# Patient Record
Sex: MALE | Race: ASIAN | Employment: OTHER | URBAN - METROPOLITAN AREA
[De-identification: names, ages, dates, MRNs, and addresses within clinical notes are randomized per-mention and may not be internally consistent; named-entity substitution may affect disease eponyms.]

---

## 2017-01-03 ENCOUNTER — TELEPHONE (OUTPATIENT)
Dept: FAMILY MEDICINE CLINIC | Age: 72
End: 2017-01-03

## 2017-02-21 ENCOUNTER — OFFICE VISIT (OUTPATIENT)
Dept: FAMILY MEDICINE CLINIC | Age: 72
End: 2017-02-21

## 2017-02-21 VITALS
DIASTOLIC BLOOD PRESSURE: 58 MMHG | WEIGHT: 165.2 LBS | BODY MASS INDEX: 27.52 KG/M2 | SYSTOLIC BLOOD PRESSURE: 122 MMHG | HEIGHT: 65 IN | HEART RATE: 76 BPM | RESPIRATION RATE: 16 BRPM

## 2017-02-21 DIAGNOSIS — I25.10 ATHEROSCLEROSIS OF NATIVE CORONARY ARTERY OF NATIVE HEART WITHOUT ANGINA PECTORIS: ICD-10-CM

## 2017-02-21 DIAGNOSIS — Z79.4 CONTROLLED TYPE 2 DIABETES MELLITUS WITHOUT COMPLICATION, WITH LONG-TERM CURRENT USE OF INSULIN (HCC): Primary | ICD-10-CM

## 2017-02-21 DIAGNOSIS — I77.9 BILATERAL CAROTID ARTERY DISEASE (HCC): ICD-10-CM

## 2017-02-21 DIAGNOSIS — I77.1 SUBCLAVIAN ARTERIAL STENOSIS (HCC): ICD-10-CM

## 2017-02-21 DIAGNOSIS — I10 ESSENTIAL HYPERTENSION: ICD-10-CM

## 2017-02-21 DIAGNOSIS — E78.00 PURE HYPERCHOLESTEROLEMIA: ICD-10-CM

## 2017-02-21 DIAGNOSIS — E11.9 CONTROLLED TYPE 2 DIABETES MELLITUS WITHOUT COMPLICATION, WITH LONG-TERM CURRENT USE OF INSULIN (HCC): Primary | ICD-10-CM

## 2017-02-21 LAB
ANION GAP SERPL CALCULATED.3IONS-SCNC: 15 MMOL/L (ref 3–16)
BUN BLDV-MCNC: 23 MG/DL (ref 7–20)
CALCIUM SERPL-MCNC: 9.3 MG/DL (ref 8.3–10.6)
CHLORIDE BLD-SCNC: 101 MMOL/L (ref 99–110)
CO2: 23 MMOL/L (ref 21–32)
CREAT SERPL-MCNC: 0.9 MG/DL (ref 0.8–1.3)
GFR AFRICAN AMERICAN: >60
GFR NON-AFRICAN AMERICAN: >60
GLUCOSE BLD-MCNC: 129 MG/DL (ref 70–99)
POTASSIUM SERPL-SCNC: 4.8 MMOL/L (ref 3.5–5.1)
SODIUM BLD-SCNC: 139 MMOL/L (ref 136–145)

## 2017-02-21 PROCEDURE — 99214 OFFICE O/P EST MOD 30 MIN: CPT | Performed by: INTERNAL MEDICINE

## 2017-02-21 PROCEDURE — 36415 COLL VENOUS BLD VENIPUNCTURE: CPT | Performed by: INTERNAL MEDICINE

## 2017-02-21 ASSESSMENT — ENCOUNTER SYMPTOMS
ALLERGIC/IMMUNOLOGIC NEGATIVE: 1
RESPIRATORY NEGATIVE: 1
GASTROINTESTINAL NEGATIVE: 1

## 2017-02-22 LAB
ESTIMATED AVERAGE GLUCOSE: 154.2 MG/DL
HBA1C MFR BLD: 7 %

## 2017-02-28 RX ORDER — RAMIPRIL 10 MG/1
CAPSULE ORAL
Qty: 90 CAPSULE | Refills: 3 | Status: SHIPPED | OUTPATIENT
Start: 2017-02-28 | End: 2018-01-08 | Stop reason: SDUPTHER

## 2017-02-28 RX ORDER — METOPROLOL SUCCINATE 50 MG/1
TABLET, EXTENDED RELEASE ORAL
Qty: 180 TABLET | Refills: 3 | Status: SHIPPED | OUTPATIENT
Start: 2017-02-28 | End: 2017-10-22 | Stop reason: SDUPTHER

## 2017-05-22 ENCOUNTER — OFFICE VISIT (OUTPATIENT)
Dept: FAMILY MEDICINE CLINIC | Age: 72
End: 2017-05-22

## 2017-05-22 VITALS
HEART RATE: 88 BPM | HEIGHT: 65 IN | DIASTOLIC BLOOD PRESSURE: 64 MMHG | SYSTOLIC BLOOD PRESSURE: 114 MMHG | BODY MASS INDEX: 27.12 KG/M2 | RESPIRATION RATE: 16 BRPM | WEIGHT: 162.8 LBS

## 2017-05-22 DIAGNOSIS — Z79.4 CONTROLLED TYPE 2 DIABETES MELLITUS WITHOUT COMPLICATION, WITH LONG-TERM CURRENT USE OF INSULIN (HCC): Primary | ICD-10-CM

## 2017-05-22 DIAGNOSIS — I25.10 ATHEROSCLEROSIS OF NATIVE CORONARY ARTERY OF NATIVE HEART WITHOUT ANGINA PECTORIS: ICD-10-CM

## 2017-05-22 DIAGNOSIS — I10 ESSENTIAL HYPERTENSION: ICD-10-CM

## 2017-05-22 DIAGNOSIS — I77.1 SUBCLAVIAN ARTERIAL STENOSIS (HCC): ICD-10-CM

## 2017-05-22 DIAGNOSIS — H93.13 TINNITUS, BILATERAL: ICD-10-CM

## 2017-05-22 DIAGNOSIS — I77.9 BILATERAL CAROTID ARTERY DISEASE (HCC): ICD-10-CM

## 2017-05-22 DIAGNOSIS — E78.00 PURE HYPERCHOLESTEROLEMIA: ICD-10-CM

## 2017-05-22 DIAGNOSIS — E11.9 CONTROLLED TYPE 2 DIABETES MELLITUS WITHOUT COMPLICATION, WITH LONG-TERM CURRENT USE OF INSULIN (HCC): Primary | ICD-10-CM

## 2017-05-22 PROCEDURE — 4010F ACE/ARB THERAPY RXD/TAKEN: CPT | Performed by: INTERNAL MEDICINE

## 2017-05-22 PROCEDURE — 36415 COLL VENOUS BLD VENIPUNCTURE: CPT | Performed by: INTERNAL MEDICINE

## 2017-05-22 PROCEDURE — 99214 OFFICE O/P EST MOD 30 MIN: CPT | Performed by: INTERNAL MEDICINE

## 2017-05-22 ASSESSMENT — ENCOUNTER SYMPTOMS
RESPIRATORY NEGATIVE: 1
BACK PAIN: 1
GASTROINTESTINAL NEGATIVE: 1
ALLERGIC/IMMUNOLOGIC NEGATIVE: 1

## 2017-05-22 ASSESSMENT — PATIENT HEALTH QUESTIONNAIRE - PHQ9
SUM OF ALL RESPONSES TO PHQ9 QUESTIONS 1 & 2: 0
2. FEELING DOWN, DEPRESSED OR HOPELESS: 0
SUM OF ALL RESPONSES TO PHQ QUESTIONS 1-9: 0
1. LITTLE INTEREST OR PLEASURE IN DOING THINGS: 0

## 2017-05-23 LAB
ANION GAP SERPL CALCULATED.3IONS-SCNC: 15 MMOL/L (ref 3–16)
BUN BLDV-MCNC: 25 MG/DL (ref 7–20)
CALCIUM SERPL-MCNC: 9.4 MG/DL (ref 8.3–10.6)
CHLORIDE BLD-SCNC: 104 MMOL/L (ref 99–110)
CO2: 21 MMOL/L (ref 21–32)
CREAT SERPL-MCNC: 1 MG/DL (ref 0.8–1.3)
ESTIMATED AVERAGE GLUCOSE: 151.3 MG/DL
GFR AFRICAN AMERICAN: >60
GFR NON-AFRICAN AMERICAN: >60
GLUCOSE BLD-MCNC: 120 MG/DL (ref 70–99)
HBA1C MFR BLD: 6.9 %
POTASSIUM SERPL-SCNC: 4.8 MMOL/L (ref 3.5–5.1)
SODIUM BLD-SCNC: 140 MMOL/L (ref 136–145)

## 2017-07-12 ENCOUNTER — OFFICE VISIT (OUTPATIENT)
Dept: ENT CLINIC | Age: 72
End: 2017-07-12

## 2017-07-12 VITALS
HEART RATE: 92 BPM | WEIGHT: 164.1 LBS | SYSTOLIC BLOOD PRESSURE: 131 MMHG | HEIGHT: 65 IN | DIASTOLIC BLOOD PRESSURE: 66 MMHG | BODY MASS INDEX: 27.34 KG/M2

## 2017-07-12 DIAGNOSIS — H91.93 HEARING LOSS OF BOTH EARS: ICD-10-CM

## 2017-07-12 DIAGNOSIS — H93.13 SUBJECTIVE TINNITUS OF BOTH EARS: Primary | ICD-10-CM

## 2017-07-12 PROCEDURE — 99203 OFFICE O/P NEW LOW 30 MIN: CPT | Performed by: OTOLARYNGOLOGY

## 2017-07-13 ENCOUNTER — OFFICE VISIT (OUTPATIENT)
Dept: ENT CLINIC | Age: 72
End: 2017-07-13

## 2017-07-13 DIAGNOSIS — H93.13 SUBJECTIVE TINNITUS, BILATERAL: ICD-10-CM

## 2017-07-13 DIAGNOSIS — H90.8 MIXED HEARING LOSS: Primary | ICD-10-CM

## 2017-07-13 DIAGNOSIS — H90.3 SENSORY HEARING LOSS, BILATERAL: ICD-10-CM

## 2017-07-13 PROCEDURE — 92557 COMPREHENSIVE HEARING TEST: CPT | Performed by: AUDIOLOGIST

## 2017-07-13 PROCEDURE — 92550 TYMPANOMETRY & REFLEX THRESH: CPT | Performed by: AUDIOLOGIST

## 2017-07-14 ENCOUNTER — OFFICE VISIT (OUTPATIENT)
Dept: ENT CLINIC | Age: 72
End: 2017-07-14

## 2017-07-14 VITALS
HEART RATE: 84 BPM | HEIGHT: 65 IN | SYSTOLIC BLOOD PRESSURE: 125 MMHG | WEIGHT: 163.8 LBS | DIASTOLIC BLOOD PRESSURE: 61 MMHG | BODY MASS INDEX: 27.29 KG/M2

## 2017-07-14 DIAGNOSIS — H90.A22 SENSORINEURAL HEARING LOSS (SNHL) OF LEFT EAR WITH RESTRICTED HEARING OF RIGHT EAR: ICD-10-CM

## 2017-07-14 DIAGNOSIS — H65.01 ACUTE SEROUS OTITIS MEDIA, RIGHT EAR: ICD-10-CM

## 2017-07-14 DIAGNOSIS — H90.A31 MIXED CONDUCTIVE AND SENSORINEURAL HEARING LOSS OF RIGHT EAR WITH RESTRICTED HEARING OF LEFT EAR: ICD-10-CM

## 2017-07-14 DIAGNOSIS — R09.82 POST-NASAL DRIP: ICD-10-CM

## 2017-07-14 DIAGNOSIS — R49.0 HOARSENESS OF VOICE: Primary | ICD-10-CM

## 2017-07-14 DIAGNOSIS — H93.13 SUBJECTIVE TINNITUS, BILATERAL: ICD-10-CM

## 2017-07-14 PROCEDURE — 31575 DIAGNOSTIC LARYNGOSCOPY: CPT | Performed by: OTOLARYNGOLOGY

## 2017-07-14 PROCEDURE — 99213 OFFICE O/P EST LOW 20 MIN: CPT | Performed by: OTOLARYNGOLOGY

## 2017-07-30 PROBLEM — H65.01 ACUTE SEROUS OTITIS MEDIA, RIGHT EAR: Status: ACTIVE | Noted: 2017-07-30

## 2017-07-30 PROBLEM — H90.A22 SENSORINEURAL HEARING LOSS (SNHL) OF LEFT EAR WITH RESTRICTED HEARING OF RIGHT EAR: Status: ACTIVE | Noted: 2017-07-30

## 2017-07-30 PROBLEM — R09.82 POST-NASAL DRIP: Status: ACTIVE | Noted: 2017-07-30

## 2017-07-30 PROBLEM — R49.0 HOARSENESS OF VOICE: Status: ACTIVE | Noted: 2017-07-30

## 2017-07-30 PROBLEM — H93.19 SUBJECTIVE TINNITUS: Status: ACTIVE | Noted: 2017-07-30

## 2017-07-30 PROBLEM — H90.A31 MIXED CONDUCTIVE AND SENSORINEURAL HEARING LOSS OF RIGHT EAR WITH RESTRICTED HEARING OF LEFT EAR: Status: ACTIVE | Noted: 2017-07-30

## 2017-08-29 ENCOUNTER — OFFICE VISIT (OUTPATIENT)
Dept: FAMILY MEDICINE CLINIC | Age: 72
End: 2017-08-29

## 2017-08-29 VITALS
HEART RATE: 88 BPM | SYSTOLIC BLOOD PRESSURE: 118 MMHG | DIASTOLIC BLOOD PRESSURE: 66 MMHG | BODY MASS INDEX: 26.92 KG/M2 | WEIGHT: 161.6 LBS | HEIGHT: 65 IN | OXYGEN SATURATION: 96 % | RESPIRATION RATE: 16 BRPM

## 2017-08-29 DIAGNOSIS — I77.9 BILATERAL CAROTID ARTERY DISEASE (HCC): ICD-10-CM

## 2017-08-29 DIAGNOSIS — E11.9 CONTROLLED TYPE 2 DIABETES MELLITUS WITHOUT COMPLICATION, WITH LONG-TERM CURRENT USE OF INSULIN (HCC): Primary | ICD-10-CM

## 2017-08-29 DIAGNOSIS — I48.0 INTERMITTENT ATRIAL FIBRILLATION (HCC): ICD-10-CM

## 2017-08-29 DIAGNOSIS — I25.10 ATHEROSCLEROSIS OF NATIVE CORONARY ARTERY OF NATIVE HEART WITHOUT ANGINA PECTORIS: ICD-10-CM

## 2017-08-29 DIAGNOSIS — I77.1 SUBCLAVIAN ARTERIAL STENOSIS (HCC): ICD-10-CM

## 2017-08-29 DIAGNOSIS — Z79.4 CONTROLLED TYPE 2 DIABETES MELLITUS WITHOUT COMPLICATION, WITH LONG-TERM CURRENT USE OF INSULIN (HCC): Primary | ICD-10-CM

## 2017-08-29 DIAGNOSIS — E78.00 PURE HYPERCHOLESTEROLEMIA: ICD-10-CM

## 2017-08-29 LAB
ANION GAP SERPL CALCULATED.3IONS-SCNC: 16 MMOL/L (ref 3–16)
BUN BLDV-MCNC: 24 MG/DL (ref 7–20)
CALCIUM SERPL-MCNC: 9.6 MG/DL (ref 8.3–10.6)
CHLORIDE BLD-SCNC: 102 MMOL/L (ref 99–110)
CO2: 22 MMOL/L (ref 21–32)
CREAT SERPL-MCNC: 0.8 MG/DL (ref 0.8–1.3)
GFR AFRICAN AMERICAN: >60
GFR NON-AFRICAN AMERICAN: >60
GLUCOSE BLD-MCNC: 161 MG/DL (ref 70–99)
POTASSIUM SERPL-SCNC: 4.6 MMOL/L (ref 3.5–5.1)
SODIUM BLD-SCNC: 140 MMOL/L (ref 136–145)

## 2017-08-29 PROCEDURE — 36415 COLL VENOUS BLD VENIPUNCTURE: CPT | Performed by: INTERNAL MEDICINE

## 2017-08-29 PROCEDURE — 99214 OFFICE O/P EST MOD 30 MIN: CPT | Performed by: INTERNAL MEDICINE

## 2017-08-29 ASSESSMENT — ENCOUNTER SYMPTOMS
ALLERGIC/IMMUNOLOGIC NEGATIVE: 1
GASTROINTESTINAL NEGATIVE: 1
RESPIRATORY NEGATIVE: 1

## 2017-08-30 LAB
ESTIMATED AVERAGE GLUCOSE: 145.6 MG/DL
HBA1C MFR BLD: 6.7 %

## 2017-09-01 ENCOUNTER — TELEPHONE (OUTPATIENT)
Dept: FAMILY MEDICINE CLINIC | Age: 72
End: 2017-09-01

## 2017-09-14 RX ORDER — LANCETS
EACH MISCELLANEOUS
Qty: 204 EACH | Refills: 2 | Status: SHIPPED | OUTPATIENT
Start: 2017-09-14 | End: 2018-01-08 | Stop reason: SDUPTHER

## 2017-09-14 RX ORDER — ATORVASTATIN CALCIUM 20 MG/1
TABLET, FILM COATED ORAL
Qty: 90 TABLET | Refills: 2 | Status: SHIPPED | OUTPATIENT
Start: 2017-09-14 | End: 2018-01-08 | Stop reason: SDUPTHER

## 2017-09-14 RX ORDER — BLOOD SUGAR DIAGNOSTIC
STRIP MISCELLANEOUS
Qty: 200 STRIP | Refills: 2 | Status: SHIPPED | OUTPATIENT
Start: 2017-09-14 | End: 2018-01-08 | Stop reason: SDUPTHER

## 2017-09-14 RX ORDER — GLIPIZIDE 5 MG/1
TABLET ORAL
Qty: 90 TABLET | Refills: 2 | Status: SHIPPED | OUTPATIENT
Start: 2017-09-14 | End: 2018-01-08 | Stop reason: SDUPTHER

## 2017-09-26 ENCOUNTER — OFFICE VISIT (OUTPATIENT)
Dept: ENT CLINIC | Age: 72
End: 2017-09-26

## 2017-09-26 VITALS
SYSTOLIC BLOOD PRESSURE: 116 MMHG | HEART RATE: 64 BPM | WEIGHT: 161.5 LBS | BODY MASS INDEX: 25.96 KG/M2 | DIASTOLIC BLOOD PRESSURE: 62 MMHG | HEIGHT: 66 IN

## 2017-09-26 DIAGNOSIS — H90.A22 SENSORINEURAL HEARING LOSS (SNHL) OF LEFT EAR WITH RESTRICTED HEARING OF RIGHT EAR: ICD-10-CM

## 2017-09-26 DIAGNOSIS — H65.21 CHRONIC SEROUS OTITIS MEDIA OF RIGHT EAR: Primary | ICD-10-CM

## 2017-09-26 DIAGNOSIS — H69.82 ETD (EUSTACHIAN TUBE DYSFUNCTION), LEFT: ICD-10-CM

## 2017-09-26 DIAGNOSIS — H90.A31 MIXED CONDUCTIVE AND SENSORINEURAL HEARING LOSS OF RIGHT EAR WITH RESTRICTED HEARING OF LEFT EAR: ICD-10-CM

## 2017-09-26 PROCEDURE — 99214 OFFICE O/P EST MOD 30 MIN: CPT | Performed by: OTOLARYNGOLOGY

## 2017-09-26 NOTE — PROGRESS NOTES
[address] [facility]          PCP:  Cooper Cardoso MD      CHIEF COMPLAINT:  Chief Complaint   Patient presents with    Otitis Media     Right serous       HISTORY OF PRESENT ILLNESS:   Alex Urbina is a 74 y.o. male here for recheck and follow-up of right serous otitis media and conductive hearing loss. Since the last visit here, his hearing in the right ear has not improved. REVIEW OF SYSTEMS:   CONSTITUTIONAL:  Denied fever and chills. Denied unexplained weight loss. EARS, NOSE, THROAT:  Denied otorrhea, otalgia, epistaxis, nasal dyspnea, rhinorrhea, sore throat and chronic hoarseness.         Current Outpatient Prescriptions   Medication Sig Dispense Refill    glipiZIDE (GLUCOTROL) 5 MG tablet Take 1 tablet by mouth  daily 90 tablet 2    atorvastatin (LIPITOR) 20 MG tablet Take 1 tablet by mouth  daily 90 tablet 2    ACCU-CHEK BRITNEY PLUS strip Test two times daily for  diabetes 200 strip 2    ACCU-CHEK FASTCLIX LANCETS MISC Test two times daily for  diabetes 204 each 2    metoprolol succinate (TOPROL XL) 50 MG extended release tablet Take 1 tablet by mouth two  times daily 180 tablet 3    ramipril (ALTACE) 10 MG capsule Take 1 capsule by mouth  every evening 90 capsule 3    SITagliptin (JANUVIA) 100 MG tablet Take 1 tablet by mouth daily 90 tablet 3    insulin glargine (LANTUS SOLOSTAR) 100 UNIT/ML injection pen Inject 10 Units into the skin nightly Requesting a 3 mth supply 100 mL 3    ramipril (ALTACE) 5 MG capsule Take 1 capsule by mouth every morning 90 capsule 3    Insulin Pen Needle ([employer] PEN NEEDLES) 31G X 6 MM MISC Use with insulin  Administration once daily 100 each 3    Lancets Misc. (ACCU-CHEK FASTCLIX LANCET) KIT Test twice daily for diabetes e11.9 1 kit 5    Insulin Syringe-Needle U-100 (KROGER INSULIN SYRINGE) 31G X 5/16\" 0.5 ML MISC 1 each by Does not apply route daily Use daily for insulin adminstration 100 each 3    Blood Glucose Monitoring Suppl (ACCU-CHEK BRITNEY PLUS) W/DEVICE KIT 1 each by Does not apply route 2 times daily. TEST TWICE DAILY FOR DIABETES 1 kit 0    aspirin EC 81 MG EC tablet Take 1 tablet by mouth daily. No current facility-administered medications for this visit. EXAMINATION:      VITALS SIGNS reviewed. Vitals:    09/26/17 1552   BP: 116/62   Pulse: 64      GENERAL APPEARANCE:  Well developed, well nourished, no apparent distress, no apparent deformities.  (+).  (+) EARS, OTOMICROSCOPY:  Bilateral hearing aids were removed for examination. The right tympanic membrane was nonerythematous, retracted, dull and thickened with middle ear effusion and a foreshortened malleus appearance. The right tympanic membrane was poorly mobile to pneumatic massage with negative middle ear pressure. The left tympanic membrane was nonerythematous, dull and thickened, with normal mobility. EXTERNAL EAR/NOSE:  Normal pinnae and mastoids. Normal external nose. NOSE:   The nasal septum was midline. The inferior turbinates were normal.  The nasal mucosa and secretions were normal.  No pus or polyps were seen.       (+) OROPHARYNX/ORAL CAVITY:  Post tonsillectomy. Oral mucosa, hard and soft palates, tongue, and pharynx were normal.      INDIRECT LARYNGOSCOPY:  Vocal cords normally mobile bilaterally with midline approximation on phonation. The epiglottis, supraglottis, false vocal cords, true vocal cords, mobility of the larynx, base of tongue, subglottis, and piriform sinuses appeared to be normal.   NECK: No masses or tenderness. No abnormal appearance, asymmetry or abnormal tracheal position. PALPATION OF LYMPH NODES, CERVICAL, FACIAL AND SUPRACLAVICULAR:  No lymphadenopathy. AUDIOGRAM  I reviewed the audiogram of 7/13/17, showing a right conductive hearing loss in the lower frequencies, associated with a flat tympanogram on the right.   There was a downsloping symmetrical bilateral mild to severe sensorineural hearing loss.  SRT was reduced to 60 dB in the right and 35 dB on the left. Word recognition score was reduced to 88% on the right and was normal at 92% on the left. Right tympanogram was flat. Left tympanogram was type As. (See report for details)          COUNSELING:    I reviewed the audiometric testing results with Jasvir. I again discussed the conductive component on the right. Telma Gaines was counseled for the procedure of right myringotomy and insertion of tympanostomy tube (PE tube). Telma Gaines stated that the desire to proceed with the surgery. The surgical procedure was described. I discussed the tympanic membrane incision. I advised of the possibility of permanent hole in the ear drum after the tube extrudes or is removed in the future. I advised of the possibility of persistent or increased hearing loss. I discussed persistent disease, middle ear effusion, and recurrent ear infections. I advised of 20% incidence of otorrhea and/or ear infection while tubes are in placed. I advised of need for strict water precautions. I discussed early extrusion of the tubes. I discussed scarring and/or thinning of the ear drum. I advised of potential complication of anesthesia, including but not limited to cardiac arrest, heart attack, stroke, adverse reaction to medications, and death. I discussed the surgical technique and the usual post operative course. I discussed the alternatives of therapy, expected outcome and potential benefits, and the attendant risks and potential complications, per the informed consent document, which was discussed together. Jasvir then asked appropriate questions, all of which were answered. Jasvir then stated that he understands and accepts the preceding, has no further questions and desires to proceed with surgery. Then, the informed consent document read and signed by Telma Liana. IMPRESSION / Herrera Wilkinson / Stella Henninging / PROCEDURES:       Telma Gaines was seen today for otitis media.     Diagnoses and all orders for this visit:    Chronic serous otitis media of right ear    ETD (eustachian tube dysfunction), left    Mixed conductive and sensorineural hearing loss of right ear with restricted hearing of left ear    Sensorineural hearing loss (SNHL) of left ear with restricted hearing of right ear          RECOMMENDATIONS / PLAN:    1. Mucinex prn dry throat. 2. See Patient Instructions. 3. Return for right myringotomy and insertion of PE tube (CPT #79456). TIME:  I spent a total of 25 minutes with this patient; counseling time = 18 minutes. More than 50% of the visit was spent counseling, coordination of care, and/or reviewing the medical record.

## 2017-09-26 NOTE — MR AVS SNAPSHOT
After Visit Summary             Matthew Treviño   2017 3:40 PM   Office Visit    Description:  Male : 1945   Provider:  Arianne Barrios MD   Department:  [facility] Ear Nose Throat              Your Follow-Up and Future Appointments         Below is a list of your follow-up and future appointments. This may not be a complete list as you may have made appointments directly with providers that we are not aware of or your providers may have made some for you. Please call your providers to confirm appointments. It is important to keep your appointments. Please bring your current insurance card, photo ID, co-pay, and all medication bottles to your appointment. If self-pay, payment is expected at the time of service. Your To-Do List     Future Appointments Provider Department Dept Phone    2017 12:00 PM Parviz Torres MD [facility] [phone]    Follow-Up    Return for right myringotomy and insertion of PE tube (CPT #70596). Information from Your Visit        Department     Name Address Phone Fax    Ashlee Hiro Cruz [address] [address] Doctor Timoteo [address] [facility] [phone]      You Were Seen for:         Comments    Chronic serous otitis media of right ear   [921447]         Vital Signs     Blood Pressure Pulse Height Weight Body Mass Index Smoking Status    116/62 (Site: Left Arm, Position: Sitting, Cuff Size: Medium Adult) 64 5' 6\" (1.676 m) 161 lb 8 oz (73.3 kg) 26.07 kg/m2 Never Smoker      Additional Information about your Body Mass Index (BMI)           Your BMI as listed above is considered overweight (25.0-29.9). BMI is an estimate of body fat, calculated from your height and weight. The higher your BMI, the greater your risk of heart disease, high blood pressure, type 2 diabetes, stroke, gallstones, arthritis, sleep apnea, and certain cancers. BMI is not perfect.   It may overestimate body fat in athletes and ACCU-CHEK FASTCLIX LANCETS MISC Test two times daily for  diabetes    metoprolol succinate (TOPROL XL) 50 MG extended release tablet Take 1 tablet by mouth two  times daily    ramipril (ALTACE) 10 MG capsule Take 1 capsule by mouth  every evening    SITagliptin (JANUVIA) 100 MG tablet Take 1 tablet by mouth daily    insulin glargine (LANTUS SOLOSTAR) 100 UNIT/ML injection pen Inject 10 Units into the skin nightly Requesting a 3 mth supply    ramipril (ALTACE) 5 MG capsule Take 1 capsule by mouth every morning    Insulin Pen Needle ([employer] PEN NEEDLES) 31G X 6 MM MISC Use with insulin  Administration once daily    Lancets Misc. (ACCU-CHEK FASTCLIX LANCET) KIT Test twice daily for diabetes e11.9    Insulin Syringe-Needle U-100 ([employer]ER INSULIN SYRINGE) 31G X 5/16\" 0.5 ML MISC 1 each by Does not apply route daily Use daily for insulin adminstration    Blood Glucose Monitoring Suppl (ACCU-CHEK BRITNEY PLUS) W/DEVICE KIT 1 each by Does not apply route 2 times daily. TEST TWICE DAILY FOR DIABETES    aspirin EC 81 MG EC tablet Take 1 tablet by mouth daily.       Allergies              Ticlid [Ticlopidine Hydrochloride]     rash         Additional Information        Basic Information     Date Of Birth Sex Race Ethnicity Preferred Language    1945 Male  Non-/Non  English      Problem List as of 9/26/2017  Date Reviewed: 9/26/2017                Medicare annual wellness visit, subsequent    Annual physical exam    Hoarseness of voice    Post-nasal drip    Subjective tinnitus    Acute serous otitis media, right ear    Mixed conductive and sensorineural hearing loss of right ear with restricted hearing of left ear    Sensorineural hearing loss (SNHL) of left ear with restricted hearing of right ear    Heart block    Fungal nail infection    Adenomatous colon polyp    Subclavian arterial stenosis (HCC)    Bilateral carotid artery disease (HCC)    Essential hypertension    Drug rash    Fatigue 4. Enter your Social Security Number ([id number]) and Date of Birth (mm/dd/yyyy) as indicated and click Submit. You will be taken to the next sign-up page. 5. Create a [employer] ID. This will be your [employer] login ID and cannot be changed, so think of one that is secure and easy to remember. 6. Create a [employer] password. You can change your password at any time. 7. Enter your Password Reset Question and Answer. This can be used at a later time if you forget your password. 8. Enter your e-mail address. You will receive e-mail notification when new information is available in [address] 9. Click Sign Up. You can now view your medical record. Additional Information  If you have questions, please contact the physician practice where you receive care. Remember, [employer] is NOT to be used for urgent needs. For medical emergencies, dial 911. For questions regarding your [employer] account call [phone]. If you have a clinical question, please call your doctor's office.

## 2017-09-26 NOTE — PATIENT INSTRUCTIONS
PREOPERATIVE INFORMATION  (Please keep this handy for reference, prior to your surgery)    · I have recommended right myringotomy and insertion of a tympanostomy tube for treatment of your right eustachian tube dysfunction and middle ear fluid with conductive hearing loss. This will be done in the office under local anesthesia. · If you are taking any \"blood thinners\", anticoagulants, such as coumadin or plavix, you will need to be off of these medications for one week prior to surgery, if approved by your primary care physician and cardiologist to be off. Please notify me if you are not able to be off your anticoagulant medication. · If you are taking regular doses of aspirin, excedrin, or other medications containing aspirin, or NSAIDS such as ibuprofen (Motrin, Advil), or naprosyn (Aleve),  you must not take these medications, for three days prior to surgery. Stop daily aspirin only if approved by your primary care physician and cardiologist to be off. Please notify me if you are not able to be off your aspirin therapy. · You will need someone to drive you home after surgery, as you may not drive an automobile for 24 to 48 hours after surgery. · Bernard Dowling, our surgery coordinator, will work with you in the scheduling and coordination of your surgery. · If you have any further questions regarding your surgery, please call the office at [phone].

## 2017-10-23 RX ORDER — RAMIPRIL 5 MG/1
5 CAPSULE ORAL EVERY MORNING
Qty: 90 CAPSULE | Refills: 2 | Status: SHIPPED | OUTPATIENT
Start: 2017-10-23 | End: 2018-01-08 | Stop reason: SDUPTHER

## 2017-10-23 RX ORDER — PEN NEEDLE, DIABETIC 29 G X1/2"
NEEDLE, DISPOSABLE MISCELLANEOUS
Qty: 100 EACH | Refills: 2 | Status: SHIPPED | OUTPATIENT
Start: 2017-10-23 | End: 2018-01-08 | Stop reason: SDUPTHER

## 2017-10-23 RX ORDER — INSULIN GLARGINE 100 [IU]/ML
INJECTION, SOLUTION SUBCUTANEOUS
Qty: 15 ML | Refills: 2 | Status: SHIPPED | OUTPATIENT
Start: 2017-10-23 | End: 2018-10-15 | Stop reason: SDUPTHER

## 2017-10-23 RX ORDER — METOPROLOL SUCCINATE 50 MG/1
TABLET, EXTENDED RELEASE ORAL
Qty: 180 TABLET | Refills: 2 | Status: SHIPPED | OUTPATIENT
Start: 2017-10-23 | End: 2018-01-08 | Stop reason: SDUPTHER

## 2017-10-24 ENCOUNTER — OFFICE VISIT (OUTPATIENT)
Dept: ENT CLINIC | Age: 72
End: 2017-10-24

## 2017-10-24 VITALS
HEART RATE: 61 BPM | SYSTOLIC BLOOD PRESSURE: 126 MMHG | WEIGHT: 159.5 LBS | DIASTOLIC BLOOD PRESSURE: 70 MMHG | HEIGHT: 66 IN | BODY MASS INDEX: 25.63 KG/M2

## 2017-10-24 DIAGNOSIS — H90.A31 MIXED CONDUCTIVE AND SENSORINEURAL HEARING LOSS OF RIGHT EAR WITH RESTRICTED HEARING OF LEFT EAR: ICD-10-CM

## 2017-10-24 DIAGNOSIS — H65.21 CHRONIC SEROUS OTITIS MEDIA OF RIGHT EAR: Primary | ICD-10-CM

## 2017-10-24 PROCEDURE — 69433 CREATE EARDRUM OPENING: CPT | Performed by: OTOLARYNGOLOGY

## 2017-10-24 RX ORDER — CIPROFLOXACIN HYDROCHLORIDE 3.5 MG/ML
SOLUTION/ DROPS TOPICAL
Qty: 1 BOTTLE | Refills: 0 | Status: SHIPPED | OUTPATIENT
Start: 2017-10-24 | End: 2018-05-29

## 2017-11-27 ENCOUNTER — OFFICE VISIT (OUTPATIENT)
Dept: FAMILY MEDICINE CLINIC | Age: 72
End: 2017-11-27

## 2017-11-27 ENCOUNTER — OFFICE VISIT (OUTPATIENT)
Dept: ENT CLINIC | Age: 72
End: 2017-11-27

## 2017-11-27 VITALS
HEIGHT: 66 IN | BODY MASS INDEX: 26.03 KG/M2 | SYSTOLIC BLOOD PRESSURE: 122 MMHG | WEIGHT: 162 LBS | HEART RATE: 97 BPM | RESPIRATION RATE: 18 BRPM | DIASTOLIC BLOOD PRESSURE: 70 MMHG | OXYGEN SATURATION: 97 %

## 2017-11-27 VITALS — HEART RATE: 90 BPM | SYSTOLIC BLOOD PRESSURE: 133 MMHG | DIASTOLIC BLOOD PRESSURE: 76 MMHG

## 2017-11-27 DIAGNOSIS — H69.82 ETD (EUSTACHIAN TUBE DYSFUNCTION), LEFT: ICD-10-CM

## 2017-11-27 DIAGNOSIS — H90.A31 MIXED CONDUCTIVE AND SENSORINEURAL HEARING LOSS OF RIGHT EAR WITH RESTRICTED HEARING OF LEFT EAR: ICD-10-CM

## 2017-11-27 DIAGNOSIS — R21 RASH/SKIN ERUPTION: Primary | ICD-10-CM

## 2017-11-27 DIAGNOSIS — H65.21 CHRONIC SEROUS OTITIS MEDIA OF RIGHT EAR: Primary | ICD-10-CM

## 2017-11-27 DIAGNOSIS — H90.A22 SENSORINEURAL HEARING LOSS (SNHL) OF LEFT EAR WITH RESTRICTED HEARING OF RIGHT EAR: ICD-10-CM

## 2017-11-27 PROBLEM — H69.92 ETD (EUSTACHIAN TUBE DYSFUNCTION), LEFT: Status: ACTIVE | Noted: 2017-11-27

## 2017-11-27 PROCEDURE — G8598 ASA/ANTIPLAT THER USED: HCPCS | Performed by: OTOLARYNGOLOGY

## 2017-11-27 PROCEDURE — G8598 ASA/ANTIPLAT THER USED: HCPCS | Performed by: INTERNAL MEDICINE

## 2017-11-27 PROCEDURE — 4040F PNEUMOC VAC/ADMIN/RCVD: CPT | Performed by: OTOLARYNGOLOGY

## 2017-11-27 PROCEDURE — 3017F COLORECTAL CA SCREEN DOC REV: CPT | Performed by: INTERNAL MEDICINE

## 2017-11-27 PROCEDURE — 99213 OFFICE O/P EST LOW 20 MIN: CPT | Performed by: INTERNAL MEDICINE

## 2017-11-27 PROCEDURE — G8484 FLU IMMUNIZE NO ADMIN: HCPCS | Performed by: OTOLARYNGOLOGY

## 2017-11-27 PROCEDURE — 4040F PNEUMOC VAC/ADMIN/RCVD: CPT | Performed by: INTERNAL MEDICINE

## 2017-11-27 PROCEDURE — 99213 OFFICE O/P EST LOW 20 MIN: CPT | Performed by: OTOLARYNGOLOGY

## 2017-11-27 PROCEDURE — 1123F ACP DISCUSS/DSCN MKR DOCD: CPT | Performed by: INTERNAL MEDICINE

## 2017-11-27 PROCEDURE — G8484 FLU IMMUNIZE NO ADMIN: HCPCS | Performed by: INTERNAL MEDICINE

## 2017-11-27 PROCEDURE — G8417 CALC BMI ABV UP PARAM F/U: HCPCS | Performed by: INTERNAL MEDICINE

## 2017-11-27 PROCEDURE — G8427 DOCREV CUR MEDS BY ELIG CLIN: HCPCS | Performed by: INTERNAL MEDICINE

## 2017-11-27 PROCEDURE — 1123F ACP DISCUSS/DSCN MKR DOCD: CPT | Performed by: OTOLARYNGOLOGY

## 2017-11-27 PROCEDURE — 3017F COLORECTAL CA SCREEN DOC REV: CPT | Performed by: OTOLARYNGOLOGY

## 2017-11-27 PROCEDURE — G8417 CALC BMI ABV UP PARAM F/U: HCPCS | Performed by: OTOLARYNGOLOGY

## 2017-11-27 PROCEDURE — G8427 DOCREV CUR MEDS BY ELIG CLIN: HCPCS | Performed by: OTOLARYNGOLOGY

## 2017-11-27 PROCEDURE — 1036F TOBACCO NON-USER: CPT | Performed by: OTOLARYNGOLOGY

## 2017-11-27 PROCEDURE — 1036F TOBACCO NON-USER: CPT | Performed by: INTERNAL MEDICINE

## 2017-11-27 RX ORDER — KETOCONAZOLE 20 MG/G
CREAM TOPICAL
Qty: 30 G | Refills: 1 | Status: SHIPPED
Start: 2017-11-27 | End: 2020-07-02

## 2017-11-27 ASSESSMENT — ENCOUNTER SYMPTOMS
RESPIRATORY NEGATIVE: 1
COLOR CHANGE: 0
ALLERGIC/IMMUNOLOGIC NEGATIVE: 1

## 2017-11-27 NOTE — PROGRESS NOTES
[address] [facility]          PCP:  Flaquito Russell MD      CHIEF COMPLAINT:  Chief Complaint   Patient presents with    Hearing Loss     and ET dysfunction, treated with left PE tube       HISTORY OF PRESENT ILLNESS:   Jayy Holguin is a 74 y.o. male here for recheck and follow-up of bilateral hearing loss, ETD left ear, and PE tube. He stated that he did not noticed much improvement in his hearing after placement of the PE tube. Since the last visit here, he has been stable with no worsening of or onset of new ENT symptoms. He is having no other ENT symptoms or problems currently. REVIEW OF SYSTEMS:     EARS, NOSE, THROAT:  Denied otorrhea, otalgia, hearing loss, tinnitus, epistaxis, nasal dyspnea, rhinorrhea, sore throat and chronic hoarseness. Current Outpatient Prescriptions   Medication Sig Dispense Refill    ketoconazole (NIZORAL) 2 % cream Apply topically daily. 30 g 1    ciprofloxacin (CILOXAN) 0.3 % ophthalmic solution Use 4 drops in the right ear three times daily for four days.  1 Bottle 0    ramipril (ALTACE) 5 MG capsule TAKE 1 CAPSULE BY MOUTH  EVERY MORNING 90 capsule 2    ULTICARE MINI PEN NEEDLES 31G X 6 MM MISC USE WITH INSULIN  ADMINISTRATION ONCE DAILY 100 each 2    metoprolol succinate (TOPROL XL) 50 MG extended release tablet TAKE 1 TABLET BY MOUTH TWO  TIMES DAILY 180 tablet 2    LANTUS SOLOSTAR 100 UNIT/ML injection pen INJECT 10 UNITS INTO THE  SKIN NIGHTLY 15 mL 2    glipiZIDE (GLUCOTROL) 5 MG tablet Take 1 tablet by mouth  daily 90 tablet 2    atorvastatin (LIPITOR) 20 MG tablet Take 1 tablet by mouth  daily 90 tablet 2    ACCU-CHEK BRITNEY PLUS strip Test two times daily for  diabetes 200 strip 2    ACCU-CHEK FASTCLIX LANCETS MISC Test two times daily for  diabetes 204 each 2    ramipril (ALTACE) 10 MG capsule Take 1 capsule by mouth  every evening 90 capsule 3    SITagliptin (JANUVIA) 100 MG tablet Take 1 tablet by mouth daily 90 tablet 3    Lancets Misc. (ACCU-CHEK FASTCLIX LANCET) KIT Test twice daily for diabetes e11.9 1 kit 5    Insulin Syringe-Needle U-100 ([employer]ER INSULIN SYRINGE) 31G X 5/16\" 0.5 ML MISC 1 each by Does not apply route daily Use daily for insulin adminstration 100 each 3    Blood Glucose Monitoring Suppl (ACCU-CHEK BRITNEY PLUS) W/DEVICE KIT 1 each by Does not apply route 2 times daily. TEST TWICE DAILY FOR DIABETES 1 kit 0    aspirin EC 81 MG EC tablet Take 1 tablet by mouth daily. No current facility-administered medications for this visit. EXAMINATION:      VITALS SIGNS reviewed. Vitals:    11/27/17 1434   BP: 133/76   Pulse: 90      GENERAL APPEARANCE:  Well developed, well nourished, no apparent distress, no apparent deformities. (+) EARS, OTOMICROSCOPY:  The TMs were non-erythematous, dull, and thickened bilaterally. There was a patent PE tube in the A/I quadrant of the left TM and no mobility by pneumatic massage. The right TM was non-erythematous, dull, and thickened with normal mobility to pneumatic massage. The EACs appeared to be normal bilaterally. EXTERNAL EAR/NOSE:  Normal pinnae and mastoids. Normal external nose. NOSE:   The nasal septum was midline. The inferior turbinates were normal.  The nasal mucosa and secretions were normal.  No pus or polyps were seen. OROPHARYNX/ORAL CAVITY:  Oral mucosa, hard and soft palates, tongue, and pharynx were normal.      NECK: No masses or tenderness. No abnormal appearance, asymmetry or abnormal tracheal position. PALPATION OF LYMPH NODES, CERVICAL, FACIAL AND SUPRACLAVICULAR:  No lymphadenopathy. IMPRESSION / Gwyn Limbo / Herchel Aleyda / PROCEDURES:       Juan Carlos Mcclure was seen today for hearing loss.     Diagnoses and all orders for this visit:    Chronic serous otitis media of right ear  -     Internal Referral to Audiology at [facility]    Mixed conductive and sensorineural hearing loss of right ear with restricted hearing of left ear  -

## 2017-11-27 NOTE — PATIENT INSTRUCTIONS
All above problems reviewed and the found to be unchanged except for the following :     Rash/Skin Eruption. Will try Ketoconazole cream daily for 14 days. If no better will refer to derm to r/o Sweets and other possible causes. (less likely/ not painful).

## 2017-11-27 NOTE — PROGRESS NOTES
Subjective:      Patient ID: Jose Daniel Taylor is a 74 y.o. male. HPI  Rash/skin eruption  Roundish Lesions both lower legs. Started 2 mo ago and getting bigger( 2 on L leg and 1 on R leg). Past Medical History:   Diagnosis Date    DVT     Hyperlipidemia     Hypertension     Stenosis     subclavical    Type II or unspecified type diabetes mellitus without mention of complication, not stated as uncontrolled      Current Outpatient Prescriptions   Medication Sig Dispense Refill    ciprofloxacin (CILOXAN) 0.3 % ophthalmic solution Use 4 drops in the right ear three times daily for four days. 1 Bottle 0    ramipril (ALTACE) 5 MG capsule TAKE 1 CAPSULE BY MOUTH  EVERY MORNING 90 capsule 2    ULTICARE MINI PEN NEEDLES 31G X 6 MM MISC USE WITH INSULIN  ADMINISTRATION ONCE DAILY 100 each 2    metoprolol succinate (TOPROL XL) 50 MG extended release tablet TAKE 1 TABLET BY MOUTH TWO  TIMES DAILY 180 tablet 2    LANTUS SOLOSTAR 100 UNIT/ML injection pen INJECT 10 UNITS INTO THE  SKIN NIGHTLY 15 mL 2    glipiZIDE (GLUCOTROL) 5 MG tablet Take 1 tablet by mouth  daily 90 tablet 2    atorvastatin (LIPITOR) 20 MG tablet Take 1 tablet by mouth  daily 90 tablet 2    ACCU-CHEK BRITNEY PLUS strip Test two times daily for  diabetes 200 strip 2    ACCU-CHEK FASTCLIX LANCETS MISC Test two times daily for  diabetes 204 each 2    ramipril (ALTACE) 10 MG capsule Take 1 capsule by mouth  every evening 90 capsule 3    SITagliptin (JANUVIA) 100 MG tablet Take 1 tablet by mouth daily 90 tablet 3    Lancets Misc. (ACCU-CHEK FASTCLIX LANCET) KIT Test twice daily for diabetes e11.9 1 kit 5    Insulin Syringe-Needle U-100 ([employer]ER INSULIN SYRINGE) 31G X 5/16\" 0.5 ML MISC 1 each by Does not apply route daily Use daily for insulin adminstration 100 each 3    Blood Glucose Monitoring Suppl (ACCU-CHEK BRITNEY PLUS) W/DEVICE KIT 1 each by Does not apply route 2 times daily.  TEST TWICE DAILY FOR DIABETES 1 kit 0    aspirin EC 81 MG EC side, and 2+ on the left side. Popliteal pulses are 2+ on the right side, and 2+ on the left side. Dorsalis pedis pulses are 2+ on the right side, and 2+ on the left side. Posterior tibial pulses are 2+ on the right side, and 2+ on the left side. Pulmonary/Chest: Effort normal and breath sounds normal. No accessory muscle usage. No apnea, no tachypnea and no bradypnea. No respiratory distress. He has no decreased breath sounds. He has no wheezes. He has no rhonchi. He has no rales. Abdominal: Normal appearance and normal aorta. There is no hepatosplenomegaly. There is no CVA tenderness. No hernia. Hernia confirmed negative in the ventral area. Neurological: He is alert and oriented to person, place, and time. He has normal strength. He is not disoriented. He displays no atrophy and no tremor. No cranial nerve deficit or sensory deficit. He exhibits normal muscle tone. He displays a negative Romberg sign. Coordination normal.   Skin: Skin is warm, dry and intact. No abrasion and no rash noted. He is not diaphoretic. No cyanosis. No pallor. Nails show no clubbing. Psychiatric: He has a normal mood and affect. His speech is normal and behavior is normal. Judgment and thought content normal. Cognition and memory are normal.           Assessment:      Problem   Rash/Skin Eruption. 2 + months and getting bigger. Plan:      All above problems reviewed and the found to be unchanged except for the following :     Rash/Skin Eruption. Will try Ketoconazole cream daily for 14 days. If no better will refer to derm to r/o Sweets and other possible causes. (less likely/ not painful).

## 2017-11-30 ENCOUNTER — OFFICE VISIT (OUTPATIENT)
Dept: ENT CLINIC | Age: 72
End: 2017-11-30

## 2017-11-30 DIAGNOSIS — H65.04 RECURRENT ACUTE SEROUS OTITIS MEDIA OF RIGHT EAR: Primary | ICD-10-CM

## 2017-11-30 PROCEDURE — 92557 COMPREHENSIVE HEARING TEST: CPT | Performed by: AUDIOLOGIST

## 2017-11-30 PROCEDURE — 92550 TYMPANOMETRY & REFLEX THRESH: CPT | Performed by: AUDIOLOGIST

## 2017-12-05 ENCOUNTER — OFFICE VISIT (OUTPATIENT)
Dept: FAMILY MEDICINE CLINIC | Age: 72
End: 2017-12-05

## 2017-12-05 VITALS
DIASTOLIC BLOOD PRESSURE: 68 MMHG | OXYGEN SATURATION: 95 % | HEIGHT: 66 IN | HEART RATE: 73 BPM | WEIGHT: 162.4 LBS | SYSTOLIC BLOOD PRESSURE: 130 MMHG | RESPIRATION RATE: 18 BRPM | BODY MASS INDEX: 26.1 KG/M2

## 2017-12-05 DIAGNOSIS — Z00.00 MEDICARE ANNUAL WELLNESS VISIT, SUBSEQUENT: Primary | ICD-10-CM

## 2017-12-05 DIAGNOSIS — I77.1 SUBCLAVIAN ARTERIAL STENOSIS (HCC): ICD-10-CM

## 2017-12-05 DIAGNOSIS — Z12.5 SCREENING FOR PROSTATE CANCER: ICD-10-CM

## 2017-12-05 DIAGNOSIS — E78.2 MIXED HYPERLIPIDEMIA: ICD-10-CM

## 2017-12-05 DIAGNOSIS — I25.10 ATHEROSCLEROSIS OF NATIVE CORONARY ARTERY OF NATIVE HEART WITHOUT ANGINA PECTORIS: ICD-10-CM

## 2017-12-05 DIAGNOSIS — I48.0 INTERMITTENT ATRIAL FIBRILLATION (HCC): ICD-10-CM

## 2017-12-05 DIAGNOSIS — E11.9 CONTROLLED TYPE 2 DIABETES MELLITUS WITHOUT COMPLICATION, WITH LONG-TERM CURRENT USE OF INSULIN (HCC): ICD-10-CM

## 2017-12-05 DIAGNOSIS — Z00.00 ROUTINE GENERAL MEDICAL EXAMINATION AT A HEALTH CARE FACILITY: ICD-10-CM

## 2017-12-05 DIAGNOSIS — I10 ESSENTIAL HYPERTENSION: ICD-10-CM

## 2017-12-05 DIAGNOSIS — Z79.4 CONTROLLED TYPE 2 DIABETES MELLITUS WITHOUT COMPLICATION, WITH LONG-TERM CURRENT USE OF INSULIN (HCC): ICD-10-CM

## 2017-12-05 DIAGNOSIS — R53.83 FATIGUE, UNSPECIFIED TYPE: ICD-10-CM

## 2017-12-05 PROCEDURE — 3044F HG A1C LEVEL LT 7.0%: CPT | Performed by: INTERNAL MEDICINE

## 2017-12-05 PROCEDURE — G0008 ADMIN INFLUENZA VIRUS VAC: HCPCS | Performed by: INTERNAL MEDICINE

## 2017-12-05 PROCEDURE — G0439 PPPS, SUBSEQ VISIT: HCPCS | Performed by: INTERNAL MEDICINE

## 2017-12-05 PROCEDURE — G8598 ASA/ANTIPLAT THER USED: HCPCS | Performed by: INTERNAL MEDICINE

## 2017-12-05 PROCEDURE — 99214 OFFICE O/P EST MOD 30 MIN: CPT | Performed by: INTERNAL MEDICINE

## 2017-12-05 PROCEDURE — 90662 IIV NO PRSV INCREASED AG IM: CPT | Performed by: INTERNAL MEDICINE

## 2017-12-05 ASSESSMENT — ANXIETY QUESTIONNAIRES: GAD7 TOTAL SCORE: 0

## 2017-12-05 ASSESSMENT — PATIENT HEALTH QUESTIONNAIRE - PHQ9: SUM OF ALL RESPONSES TO PHQ QUESTIONS 1-9: 0

## 2017-12-05 ASSESSMENT — LIFESTYLE VARIABLES: HOW OFTEN DO YOU HAVE A DRINK CONTAINING ALCOHOL: 0

## 2017-12-05 NOTE — ASSESSMENT & PLAN NOTE
Sugars are good, diet is stable, weight is stable, no reported neuropathy, no change in vision, no claudication, no foot ulcers, no new skin lesions. No change in medications. No c/o with meds. Last eye exam recent.

## 2017-12-05 NOTE — PATIENT INSTRUCTIONS
Medicare Annual Wellness Visit, Subsequent. Flu shot today. Subclavian Arterial Stenosis (Hcc). Will do MRA chest.   Essential Hypertension. Continue present meds. Home BP checks. Call if>140/90. Improve diet. Avoid caffeine and salt. Call if new c/o w/ meds. Intermittent Atrial Fibrillation (Hcc). NSR. Call if new c/o. Atherosclerosis of Native Coronary Artery of Native Heart Without Angina Pectoris. Continue meds. Call if new c/o. Controlled Type 2 Diabetes Mellitus Without Complication, With Long-Term Current Use of Insulin (Hcc). Will do labs. Will call if need to adjust meds. To improve diet and lose some weight. Prostate: today  Colonoscopy: 3/31/2015. Tubular adenoma. rechx 5 yrs. DEXA: na  Eye: Appt this month  Hearing: recently tested. Bilateral hearing loss. Immunization: flu shot today  MMSE: 30/30  Get Up and Go: passed  Tob: nonsmoker/never. ETOH: none  Caffeine: moderate am  Cardiac Risk Assessment: has PVD. Living will: yes  Medical power of : yes        Personalized Preventive Plan for Aleida Browne - 12/5/2017  Medicare offers a range of preventive health benefits. Some of the tests and screenings are paid in full while other may be subject to a deductible, co-insurance, and/or copay. Some of these benefits include a comprehensive review of your medical history including lifestyle, illnesses that may run in your family, and various assessments and screenings as appropriate. After reviewing your medical record and screening and assessments performed today your provider may have ordered immunizations, labs, imaging, and/or referrals for you. A list of these orders (if applicable) as well as your Preventive Care list are included within your After Visit Summary for your review.     Other Preventive Recommendations:    · A preventive eye exam performed by an eye specialist is recommended every 1-2 years to screen for glaucoma; cataracts, macular degeneration, and other eye disorders. · A preventive dental visit is recommended every 6 months. · Try to get at least 150 minutes of exercise per week or 10,000 steps per day on a pedometer . · Order or download the FREE \"Exercise & Physical Activity: Your Everyday Guide\" from [employer]. Call 2-281.220.9289 or search [employer] online. · You need 6036-7898 mg of calcium and 9351-5515 IU of vitamin D per day. It is possible to meet your calcium requirement with diet alone, but a vitamin D supplement is usually necessary to meet this goal.  · When exposed to the sun, use a sunscreen that protects against both UVA and UVB radiation with an SPF of 30 or greater. Reapply every 2 to 3 hours or after sweating, drying off with a towel, or swimming. · Always wear a seat belt when traveling in a car. Always wear a helmet when riding a bicycle or motorcycle.

## 2017-12-05 NOTE — ASSESSMENT & PLAN NOTE
No change in meds, no c/o with meds, no chest pain, SOB, palpatations, or syncope. Home bp was good w/ meds.

## 2017-12-05 NOTE — ASSESSMENT & PLAN NOTE
Prostate: today  Colonoscopy: 3/31/2015. Tubular adenoma. rechx 5 yrs. DEXA: na  Eye: Appt this month  Hearing: recently tested. Bilateral hearing loss. Immunization: flu shot today  MMSE: 30/30  Get Up and Go: passed  Tob: nonsmoker/never. ETOH: none  Caffeine: moderate am  Cardiac Risk Assessment: has PVD.   Living will: yes  Medical power of : yes

## 2017-12-05 NOTE — ASSESSMENT & PLAN NOTE
The pt has reported no chest pain,palpatations,edema,shortness of breath,syncope, or lightheadedness since their last visit. They have not required any prn meds for angina-like c/o. Last cardio visit was 6/2016(appt 6/2018). . Last GXT was normal.

## 2017-12-05 NOTE — PROGRESS NOTES
MD     Past Medical History:   Diagnosis Date    DVT     Hyperlipidemia     Hypertension     Stenosis     subclavical    Type II or unspecified type diabetes mellitus without mention of complication, not stated as uncontrolled      Past Surgical History:   Procedure Laterality Date    CORONARY ARTERY BYPASS GRAFT      TONSILLECTOMY AND ADENOIDECTOMY  1953     Family History   Problem Relation Age of Onset    Coronary Art Dis Mother     Hypertension Mother     Coronary Art Dis Father     Hypertension Father        CareTeam (Including outside providers/suppliers regularly involved in providing care):   Patient Care Team:  Jeanmarie Oh MD as PCP - General (Internal Medicine)  Jeanmarie Oh MD as PCP - S Attributed Provider  Jasen Thomas MD (Inactive) (Cardiology)  Nany Barrientos MD as Consulting Physician (Otolaryngology)    Wt Readings from Last 3 Encounters:   12/05/17 162 lb 6.4 oz (73.7 kg)   11/27/17 162 lb (73.5 kg)   10/24/17 159 lb 8 oz (72.3 kg)     Vitals:    12/05/17 1150 12/05/17 1222   BP: 138/66 130/68   Pulse: 73    Resp: 18    SpO2: 95%    Weight: 162 lb 6.4 oz (73.7 kg)    Height: 5' 6\" (1.676 m)      General Appearance: alert and oriented to person, place and time, well developed and well- nourished, in no acute distress  Skin: warm and dry, no rash or erythema  Head: normocephalic and atraumatic  Eyes: pupils equal, round, and reactive to light, extraocular eye movements intact, conjunctivae normal  ENT: tympanic membrane, external ear and ear canal normal bilaterally, nose without deformity, nasal mucosa and turbinates normal without polyps  Neck: supple and non-tender without mass, no thyromegaly or thyroid nodules, no cervical lymphadenopathy  Pulmonary/Chest: clear to auscultation bilaterally- no wheezes, rales or rhonchi, normal air movement, no respiratory distress  Cardiovascular: normal rate, regular rhythm, normal S1 and S2, no murmurs, rubs, clicks, or gallops, distal pulses intact, R carotid bruit/Subclavian Bruit. Pacemaker in place. Abdomen: soft, non-tender, non-distended, normal bowel sounds, no masses or organomegaly  Extremities: no cyanosis, clubbing or edema  Musculoskeletal: normal range of motion, no joint swelling, deformity or tenderness  Neurologic: reflexes normal and symmetric, no cranial nerve deficit, gait, coordination and speech normal.Diabetic foot exam was normal with intact light touch and vibratory senses. Patient's complete Health Risk Assessment and screening values have been reviewed and are found in Flowsheets. The following problems were reviewed today and where indicated follow up appointments were made and/or referrals ordered. Positive Risk Factor Screenings with Interventions:     General Health:  General  In general, how would you say your health is?: Very Good  In the past 7 days, have you experienced any of the following?: (!) New or Increased Pain  Do you get the social and emotional support that you need?: Yes  Do you have a Living Will?: Yes  General Health Risk Interventions:  · None indicated    Health Habits/Nutrition:  Health Habits/Nutrition  Do you exercise for at least 20 minutes 2-3 times per week?: Yes  Have you lost any weight without trying in the past 3 months?: (!) Yes  Do you eat fewer than 2 meals per day?: (!) Yes  Have you seen a dentist within the past year?: Yes  Body mass index is 26.21 kg/m². Health Habits/Nutrition Interventions:  · None indicated    Hearing/Vision:  Hearing/Vision  Do you or your family notice any trouble with your hearing?: (!) Yes  Do you have difficulty driving, watching TV, or doing any of your daily activities because of your eyesight?: No  Have you had an eye exam within the past year?: Yes  Hearing/Vision Interventions:  · Hearing concerns:  hearing loss documeneted. needs HAs.     Safety:  Safety  Do you have working smoke detectors?: Yes  Have all throw rugs been removed or fastened?:

## 2017-12-07 DIAGNOSIS — Z79.4 CONTROLLED TYPE 2 DIABETES MELLITUS WITHOUT COMPLICATION, WITH LONG-TERM CURRENT USE OF INSULIN (HCC): ICD-10-CM

## 2017-12-07 DIAGNOSIS — E11.9 CONTROLLED TYPE 2 DIABETES MELLITUS WITHOUT COMPLICATION, WITH LONG-TERM CURRENT USE OF INSULIN (HCC): ICD-10-CM

## 2017-12-07 DIAGNOSIS — R53.83 FATIGUE, UNSPECIFIED TYPE: ICD-10-CM

## 2017-12-07 DIAGNOSIS — I10 ESSENTIAL HYPERTENSION: ICD-10-CM

## 2017-12-07 DIAGNOSIS — E78.2 MIXED HYPERLIPIDEMIA: ICD-10-CM

## 2017-12-07 DIAGNOSIS — I25.10 ATHEROSCLEROSIS OF NATIVE CORONARY ARTERY OF NATIVE HEART WITHOUT ANGINA PECTORIS: ICD-10-CM

## 2017-12-07 DIAGNOSIS — Z12.5 SCREENING FOR PROSTATE CANCER: ICD-10-CM

## 2017-12-07 DIAGNOSIS — I77.1 SUBCLAVIAN ARTERIAL STENOSIS (HCC): ICD-10-CM

## 2017-12-07 DIAGNOSIS — I48.0 INTERMITTENT ATRIAL FIBRILLATION (HCC): ICD-10-CM

## 2017-12-07 LAB
ALBUMIN SERPL-MCNC: 4.5 G/DL (ref 3.4–5)
ALP BLD-CCNC: 49 U/L (ref 40–129)
ALT SERPL-CCNC: 17 U/L (ref 10–40)
ANION GAP SERPL CALCULATED.3IONS-SCNC: 14 MMOL/L (ref 3–16)
AST SERPL-CCNC: 15 U/L (ref 15–37)
BILIRUB SERPL-MCNC: 0.6 MG/DL (ref 0–1)
BILIRUBIN DIRECT: <0.2 MG/DL (ref 0–0.3)
BILIRUBIN, INDIRECT: NORMAL MG/DL (ref 0–1)
BUN BLDV-MCNC: 30 MG/DL (ref 7–20)
CALCIUM SERPL-MCNC: 9.3 MG/DL (ref 8.3–10.6)
CHLORIDE BLD-SCNC: 104 MMOL/L (ref 99–110)
CHOLESTEROL, TOTAL: 161 MG/DL (ref 0–199)
CO2: 25 MMOL/L (ref 21–32)
CREAT SERPL-MCNC: 1 MG/DL (ref 0.8–1.3)
GFR AFRICAN AMERICAN: >60
GFR NON-AFRICAN AMERICAN: >60
GLUCOSE BLD-MCNC: 135 MG/DL (ref 70–99)
HCT VFR BLD CALC: 46.1 % (ref 40.5–52.5)
HDLC SERPL-MCNC: 54 MG/DL (ref 40–60)
HEMOGLOBIN: 14.9 G/DL (ref 13.5–17.5)
LDL CHOLESTEROL CALCULATED: 86 MG/DL
MCH RBC QN AUTO: 28.1 PG (ref 26–34)
MCHC RBC AUTO-ENTMCNC: 32.3 G/DL (ref 31–36)
MCV RBC AUTO: 86.9 FL (ref 80–100)
PDW BLD-RTO: 15.6 % (ref 12.4–15.4)
PHOSPHORUS: 3.5 MG/DL (ref 2.5–4.9)
PLATELET # BLD: 129 K/UL (ref 135–450)
PMV BLD AUTO: 9.4 FL (ref 5–10.5)
POTASSIUM SERPL-SCNC: 4.7 MMOL/L (ref 3.5–5.1)
PROSTATE SPECIFIC ANTIGEN: 0.27 NG/ML (ref 0–4)
RBC # BLD: 5.31 M/UL (ref 4.2–5.9)
SODIUM BLD-SCNC: 143 MMOL/L (ref 136–145)
TOTAL PROTEIN: 7.1 G/DL (ref 6.4–8.2)
TRIGL SERPL-MCNC: 106 MG/DL (ref 0–150)
TSH SERPL DL<=0.05 MIU/L-ACNC: 1.05 UIU/ML (ref 0.27–4.2)
VLDLC SERPL CALC-MCNC: 21 MG/DL
WBC # BLD: 7.4 K/UL (ref 4–11)

## 2017-12-08 LAB
ESTIMATED AVERAGE GLUCOSE: 151.3 MG/DL
HBA1C MFR BLD: 6.9 %

## 2017-12-15 ENCOUNTER — TELEPHONE (OUTPATIENT)
Dept: FAMILY MEDICINE CLINIC | Age: 72
End: 2017-12-15

## 2017-12-15 NOTE — TELEPHONE ENCOUNTER
Patient would like to be referred to a specialist for the continuing rash he has been seen for. Who would you want him to see?

## 2017-12-18 ENCOUNTER — TELEPHONE (OUTPATIENT)
Dept: DERMATOLOGY | Age: 72
End: 2017-12-18

## 2017-12-18 NOTE — TELEPHONE ENCOUNTER
Dr. Landy Black referred patient to us for a rash. Per the patient Dr. aLndy Black wanted him seen within the next week? Would Dr. Shruti Olivo be able to see him?

## 2017-12-19 ENCOUNTER — OFFICE VISIT (OUTPATIENT)
Dept: DERMATOLOGY | Age: 72
End: 2017-12-19

## 2017-12-19 DIAGNOSIS — Z78.9 NON-TOBACCO USER: ICD-10-CM

## 2017-12-19 DIAGNOSIS — L30.0 NUMMULAR ECZEMA: Primary | ICD-10-CM

## 2017-12-19 DIAGNOSIS — L85.3 XEROSIS OF SKIN: ICD-10-CM

## 2017-12-19 PROCEDURE — 99202 OFFICE O/P NEW SF 15 MIN: CPT | Performed by: DERMATOLOGY

## 2017-12-19 NOTE — PATIENT INSTRUCTIONS
Continue with Dove soap when washing     Thick moisturizing cream- Otc Cerave cream in a jar     triamcinolone cream- Twice a day for two weeks

## 2018-01-04 ENCOUNTER — OFFICE VISIT (OUTPATIENT)
Dept: DERMATOLOGY | Age: 73
End: 2018-01-04

## 2018-01-04 DIAGNOSIS — L81.0 POSTINFLAMMATORY HYPERPIGMENTATION: ICD-10-CM

## 2018-01-04 DIAGNOSIS — L30.0 NUMMULAR ECZEMA: Primary | ICD-10-CM

## 2018-01-04 DIAGNOSIS — Z78.9 NON-TOBACCO USER: ICD-10-CM

## 2018-01-04 DIAGNOSIS — L85.3 XEROSIS OF SKIN: ICD-10-CM

## 2018-01-04 PROCEDURE — G8427 DOCREV CUR MEDS BY ELIG CLIN: HCPCS | Performed by: DERMATOLOGY

## 2018-01-04 PROCEDURE — 99213 OFFICE O/P EST LOW 20 MIN: CPT | Performed by: DERMATOLOGY

## 2018-01-04 PROCEDURE — 1036F TOBACCO NON-USER: CPT | Performed by: DERMATOLOGY

## 2018-01-04 PROCEDURE — G8598 ASA/ANTIPLAT THER USED: HCPCS | Performed by: DERMATOLOGY

## 2018-01-04 PROCEDURE — G8417 CALC BMI ABV UP PARAM F/U: HCPCS | Performed by: DERMATOLOGY

## 2018-01-04 PROCEDURE — G8484 FLU IMMUNIZE NO ADMIN: HCPCS | Performed by: DERMATOLOGY

## 2018-01-04 PROCEDURE — 4040F PNEUMOC VAC/ADMIN/RCVD: CPT | Performed by: DERMATOLOGY

## 2018-01-04 PROCEDURE — 1123F ACP DISCUSS/DSCN MKR DOCD: CPT | Performed by: DERMATOLOGY

## 2018-01-04 PROCEDURE — 3017F COLORECTAL CA SCREEN DOC REV: CPT | Performed by: DERMATOLOGY

## 2018-01-08 ENCOUNTER — TELEPHONE (OUTPATIENT)
Dept: FAMILY MEDICINE CLINIC | Age: 73
End: 2018-01-08

## 2018-01-08 RX ORDER — RAMIPRIL 10 MG/1
CAPSULE ORAL
Qty: 90 CAPSULE | Refills: 2 | Status: SHIPPED | OUTPATIENT
Start: 2018-01-08 | End: 2018-08-27 | Stop reason: SDUPTHER

## 2018-01-08 RX ORDER — RAMIPRIL 5 MG/1
5 CAPSULE ORAL EVERY MORNING
Qty: 90 CAPSULE | Refills: 2 | Status: SHIPPED | OUTPATIENT
Start: 2018-01-08 | End: 2018-11-10 | Stop reason: SDUPTHER

## 2018-01-08 RX ORDER — GLIPIZIDE 5 MG/1
TABLET ORAL
Qty: 90 TABLET | Refills: 2 | Status: SHIPPED | OUTPATIENT
Start: 2018-01-08 | End: 2018-08-11 | Stop reason: SDUPTHER

## 2018-01-08 RX ORDER — ATORVASTATIN CALCIUM 20 MG/1
TABLET, FILM COATED ORAL
Qty: 90 TABLET | Refills: 2 | Status: SHIPPED | OUTPATIENT
Start: 2018-01-08 | End: 2018-08-11 | Stop reason: SDUPTHER

## 2018-01-08 RX ORDER — METOPROLOL SUCCINATE 50 MG/1
TABLET, EXTENDED RELEASE ORAL
Qty: 180 TABLET | Refills: 2 | Status: SHIPPED | OUTPATIENT
Start: 2018-01-08 | End: 2018-08-11 | Stop reason: SDUPTHER

## 2018-01-08 RX ORDER — LANCETS
EACH MISCELLANEOUS
Qty: 204 EACH | Refills: 2 | Status: SHIPPED | OUTPATIENT
Start: 2018-01-08 | End: 2019-08-01 | Stop reason: SDUPTHER

## 2018-03-29 ENCOUNTER — OFFICE VISIT (OUTPATIENT)
Dept: FAMILY MEDICINE CLINIC | Age: 73
End: 2018-03-29

## 2018-03-29 VITALS
HEIGHT: 66 IN | DIASTOLIC BLOOD PRESSURE: 64 MMHG | WEIGHT: 161 LBS | RESPIRATION RATE: 16 BRPM | HEART RATE: 60 BPM | OXYGEN SATURATION: 95 % | SYSTOLIC BLOOD PRESSURE: 122 MMHG | BODY MASS INDEX: 25.88 KG/M2

## 2018-03-29 DIAGNOSIS — E11.9 CONTROLLED TYPE 2 DIABETES MELLITUS WITHOUT COMPLICATION, WITH LONG-TERM CURRENT USE OF INSULIN (HCC): ICD-10-CM

## 2018-03-29 DIAGNOSIS — E78.00 PURE HYPERCHOLESTEROLEMIA: ICD-10-CM

## 2018-03-29 DIAGNOSIS — Z79.4 CONTROLLED TYPE 2 DIABETES MELLITUS WITHOUT COMPLICATION, WITH LONG-TERM CURRENT USE OF INSULIN (HCC): ICD-10-CM

## 2018-03-29 DIAGNOSIS — I77.1 SUBCLAVIAN ARTERIAL STENOSIS (HCC): ICD-10-CM

## 2018-03-29 DIAGNOSIS — I10 ESSENTIAL HYPERTENSION: ICD-10-CM

## 2018-03-29 LAB
ANION GAP SERPL CALCULATED.3IONS-SCNC: 14 MMOL/L (ref 3–16)
BUN BLDV-MCNC: 30 MG/DL (ref 7–20)
CALCIUM SERPL-MCNC: 9.3 MG/DL (ref 8.3–10.6)
CHLORIDE BLD-SCNC: 102 MMOL/L (ref 99–110)
CO2: 24 MMOL/L (ref 21–32)
CREAT SERPL-MCNC: 1 MG/DL (ref 0.8–1.3)
GFR AFRICAN AMERICAN: >60
GFR NON-AFRICAN AMERICAN: >60
GLUCOSE BLD-MCNC: 120 MG/DL (ref 70–99)
POTASSIUM SERPL-SCNC: 4.4 MMOL/L (ref 3.5–5.1)
SODIUM BLD-SCNC: 140 MMOL/L (ref 136–145)

## 2018-03-29 PROCEDURE — 99214 OFFICE O/P EST MOD 30 MIN: CPT | Performed by: INTERNAL MEDICINE

## 2018-03-29 PROCEDURE — 1123F ACP DISCUSS/DSCN MKR DOCD: CPT | Performed by: INTERNAL MEDICINE

## 2018-03-29 PROCEDURE — G8598 ASA/ANTIPLAT THER USED: HCPCS | Performed by: INTERNAL MEDICINE

## 2018-03-29 PROCEDURE — G8482 FLU IMMUNIZE ORDER/ADMIN: HCPCS | Performed by: INTERNAL MEDICINE

## 2018-03-29 PROCEDURE — G8417 CALC BMI ABV UP PARAM F/U: HCPCS | Performed by: INTERNAL MEDICINE

## 2018-03-29 PROCEDURE — 36415 COLL VENOUS BLD VENIPUNCTURE: CPT | Performed by: INTERNAL MEDICINE

## 2018-03-29 PROCEDURE — 4040F PNEUMOC VAC/ADMIN/RCVD: CPT | Performed by: INTERNAL MEDICINE

## 2018-03-29 PROCEDURE — G8427 DOCREV CUR MEDS BY ELIG CLIN: HCPCS | Performed by: INTERNAL MEDICINE

## 2018-03-29 PROCEDURE — 1036F TOBACCO NON-USER: CPT | Performed by: INTERNAL MEDICINE

## 2018-03-29 PROCEDURE — 3046F HEMOGLOBIN A1C LEVEL >9.0%: CPT | Performed by: INTERNAL MEDICINE

## 2018-03-29 PROCEDURE — 3017F COLORECTAL CA SCREEN DOC REV: CPT | Performed by: INTERNAL MEDICINE

## 2018-03-29 RX ORDER — SF CHERRY 5.8 MG/1
1 LOZENGE ORAL DAILY
Qty: 1 EACH | Refills: 0 | Status: SHIPPED | OUTPATIENT
Start: 2018-03-29 | End: 2021-05-13

## 2018-03-29 ASSESSMENT — ENCOUNTER SYMPTOMS
ALLERGIC/IMMUNOLOGIC NEGATIVE: 1
GASTROINTESTINAL NEGATIVE: 1
RESPIRATORY NEGATIVE: 1

## 2018-03-29 NOTE — ASSESSMENT & PLAN NOTE
Sugars are good, diet is stable, weight is stable, no reported neuropathy, no change in vision, no claudication, no foot ulcers, no new skin lesions. No change in medications. No c/o with meds. Last eye exam 12/2017.

## 2018-03-29 NOTE — PROGRESS NOTES
Subjective:      Patient ID: Mega Martínez is a 74 y.o. male. HPI  Subclavian arterial stenosis (HCC)  Hasn't done test.     Pure hypercholesterolemia  Stable diet and good wt. No c/o w/ meds. Essential hypertension  No change in meds, no c/o with meds, no chest pain, SOB, palpatations, or syncope. Home bp was good w/ meds. Controlled type 2 diabetes mellitus without complication, with long-term current use of insulin (HCC)  Sugars are good, diet is stable, weight is stable, no reported neuropathy, no change in vision, no claudication, no foot ulcers, no new skin lesions. No change in medications. No c/o with meds. Last eye exam 12/2017.     Past Medical History:   Diagnosis Date    DVT     Hyperlipidemia     Hypertension     Stenosis     subclavical    Type II or unspecified type diabetes mellitus without mention of complication, not stated as uncontrolled      Current Outpatient Prescriptions   Medication Sig Dispense Refill    Lancet Devices (PUBLIX LANCET AUTO INJECTOR) MISC 1 applicator by Does not apply route daily 1 each 0    SITagliptin (JANUVIA) 100 MG tablet Take 1 tablet by mouth daily 90 tablet 2    ramipril (ALTACE) 10 MG capsule Take 1 capsule by mouth  every evening 90 capsule 2    ramipril (ALTACE) 5 MG capsule Take 1 capsule by mouth every morning 90 capsule 2    metoprolol succinate (TOPROL XL) 50 MG extended release tablet TAKE 1 TABLET BY MOUTH TWO  TIMES DAILY 180 tablet 2    ACCU-CHEK FASTCLIX LANCETS MISC Test two times daily for  diabetes 204 each 2    glucose blood VI test strips (ACCU-CHEK BRITNEY PLUS) strip Test two times daily for  diabetes 200 strip 2    Insulin Pen Needle (ULTICARE MINI PEN NEEDLES) 31G X 6 MM MISC USE WITH INSULIN  ADMINISTRATION ONCE DAILY 100 each 2    atorvastatin (LIPITOR) 20 MG tablet Take 1 tablet by mouth  daily 90 tablet 2    glipiZIDE (GLUCOTROL) 5 MG tablet Take 1 tablet by mouth  daily 90 tablet 2    triamcinolone (KENALOG) 0.1 % ointment Apply to affected area twice daily for up to 2 weeks or until improved. 80 g 2    ketoconazole (NIZORAL) 2 % cream Apply topically daily. 30 g 1    LANTUS SOLOSTAR 100 UNIT/ML injection pen INJECT 10 UNITS INTO THE  SKIN NIGHTLY 15 mL 2    Lancets Misc. (ACCU-CHEK FASTCLIX LANCET) KIT Test twice daily for diabetes e11.9 1 kit 5    Insulin Syringe-Needle U-100 ([employer]ER INSULIN SYRINGE) 31G X 5/16\" 0.5 ML MISC 1 each by Does not apply route daily Use daily for insulin adminstration 100 each 3    Blood Glucose Monitoring Suppl (ACCU-CHEK BRITNEY PLUS) W/DEVICE KIT 1 each by Does not apply route 2 times daily. TEST TWICE DAILY FOR DIABETES 1 kit 0    aspirin EC 81 MG EC tablet Take 1 tablet by mouth daily.  ciprofloxacin (CILOXAN) 0.3 % ophthalmic solution Use 4 drops in the right ear three times daily for four days. 1 Bottle 0     No current facility-administered medications for this visit. Allergies   Allergen Reactions    Ticlid [Ticlopidine Hydrochloride]      rash     Social History   Substance Use Topics    Smoking status: Never Smoker    Smokeless tobacco: Never Used    Alcohol use No     Family History   Problem Relation Age of Onset    Coronary Art Dis Mother     Hypertension Mother     Coronary Art Dis Father     Hypertension Father        Review of Systems   Constitutional: Negative. Respiratory: Negative. Cardiovascular: Negative. Gastrointestinal: Negative. Endocrine: Negative. Genitourinary: Negative. Musculoskeletal: Negative. Skin: Negative. Allergic/Immunologic: Negative. Neurological: Negative. Hematological: Negative. Psychiatric/Behavioral: Negative.       Vitals:    03/29/18 1351 03/29/18 1455   BP: 120/60 122/64   Site: Left Arm    Position: Sitting    Cuff Size: Medium Adult    Pulse: 60    Resp: 16    SpO2: 95%    Weight: 161 lb (73 kg)    Height: 5' 6\" (1.676 m)        Objective:   Physical Exam   Constitutional: He is oriented to

## 2018-03-29 NOTE — PATIENT INSTRUCTIONS
All above problems reviewed and the found to be unchanged except for the following :     Subclavian Arterial Stenosis (Hcc). To Schedule MRA     Essential Hypertension. Continue present meds. Home BP checks. Call if>140/90. Improve diet. Avoid caffeine and salt. Call if new c/o w/ meds. Controlled Type 2 Diabetes Mellitus Without Complication, With Long-Term Current Use of Insulin (Hcc). Will do labs and adjust meds as needed. Continue to improve diet and lose a couple lbs.     Pure Hypercholesterolemia

## 2018-03-30 LAB
ESTIMATED AVERAGE GLUCOSE: 151.3 MG/DL
HBA1C MFR BLD: 6.9 %

## 2018-04-18 ENCOUNTER — TELEPHONE (OUTPATIENT)
Dept: FAMILY MEDICINE CLINIC | Age: 73
End: 2018-04-18

## 2018-04-19 ENCOUNTER — TELEPHONE (OUTPATIENT)
Dept: FAMILY MEDICINE CLINIC | Age: 73
End: 2018-04-19

## 2018-05-14 ENCOUNTER — TELEPHONE (OUTPATIENT)
Dept: FAMILY MEDICINE CLINIC | Age: 73
End: 2018-05-14

## 2018-05-14 DIAGNOSIS — I77.1 SUBCLAVIAN ARTERIAL STENOSIS (HCC): Primary | ICD-10-CM

## 2018-05-14 DIAGNOSIS — I77.9 BILATERAL CAROTID ARTERY DISEASE (HCC): ICD-10-CM

## 2018-05-29 ENCOUNTER — OFFICE VISIT (OUTPATIENT)
Dept: ENT CLINIC | Age: 73
End: 2018-05-29

## 2018-05-29 VITALS — DIASTOLIC BLOOD PRESSURE: 75 MMHG | HEART RATE: 79 BPM | SYSTOLIC BLOOD PRESSURE: 138 MMHG

## 2018-05-29 DIAGNOSIS — H93.13 SUBJECTIVE TINNITUS OF BOTH EARS: ICD-10-CM

## 2018-05-29 DIAGNOSIS — H90.A31 MIXED CONDUCTIVE AND SENSORINEURAL HEARING LOSS OF RIGHT EAR WITH RESTRICTED HEARING OF LEFT EAR: ICD-10-CM

## 2018-05-29 DIAGNOSIS — Z45.89 TYMPANOSTOMY TUBE CHECK: ICD-10-CM

## 2018-05-29 DIAGNOSIS — H65.21 CHRONIC SEROUS OTITIS MEDIA OF RIGHT EAR: Chronic | ICD-10-CM

## 2018-05-29 DIAGNOSIS — H65.04 RECURRENT ACUTE SEROUS OTITIS MEDIA OF RIGHT EAR: Primary | ICD-10-CM

## 2018-05-29 PROCEDURE — 99212 OFFICE O/P EST SF 10 MIN: CPT | Performed by: OTOLARYNGOLOGY

## 2018-05-29 PROCEDURE — G8427 DOCREV CUR MEDS BY ELIG CLIN: HCPCS | Performed by: OTOLARYNGOLOGY

## 2018-05-29 PROCEDURE — G8417 CALC BMI ABV UP PARAM F/U: HCPCS | Performed by: OTOLARYNGOLOGY

## 2018-05-29 PROCEDURE — 4040F PNEUMOC VAC/ADMIN/RCVD: CPT | Performed by: OTOLARYNGOLOGY

## 2018-05-29 PROCEDURE — 1036F TOBACCO NON-USER: CPT | Performed by: OTOLARYNGOLOGY

## 2018-05-29 PROCEDURE — 1123F ACP DISCUSS/DSCN MKR DOCD: CPT | Performed by: OTOLARYNGOLOGY

## 2018-05-29 PROCEDURE — G8598 ASA/ANTIPLAT THER USED: HCPCS | Performed by: OTOLARYNGOLOGY

## 2018-05-29 PROCEDURE — 3017F COLORECTAL CA SCREEN DOC REV: CPT | Performed by: OTOLARYNGOLOGY

## 2018-06-20 ENCOUNTER — TELEPHONE (OUTPATIENT)
Dept: FAMILY MEDICINE CLINIC | Age: 73
End: 2018-06-20

## 2018-06-20 DIAGNOSIS — I77.9 BILATERAL CAROTID ARTERY DISEASE (HCC): Primary | ICD-10-CM

## 2018-06-29 ENCOUNTER — TELEPHONE (OUTPATIENT)
Dept: FAMILY MEDICINE CLINIC | Age: 73
End: 2018-06-29

## 2018-07-19 ENCOUNTER — TELEPHONE (OUTPATIENT)
Dept: FAMILY MEDICINE CLINIC | Age: 73
End: 2018-07-19

## 2018-07-19 NOTE — TELEPHONE ENCOUNTER
Cassandra Severance called from Dr. Srikanth Allen office. He is requesting a Carotid Duplex Study on the patient. He has not had one for three years. Patient will be having a new patient appointment with them soon.

## 2018-07-20 ENCOUNTER — TELEPHONE (OUTPATIENT)
Dept: FAMILY MEDICINE CLINIC | Age: 73
End: 2018-07-20

## 2018-07-20 NOTE — TELEPHONE ENCOUNTER
Lukas Guevara from Dr. Dickens Showers office called regarding our mutual patient requesting that Dr. Neisha Aponte order an ultrasound of the patients carotid artery. They would like to have something to go off of. You can reach her back at the number listed.

## 2018-07-20 NOTE — TELEPHONE ENCOUNTER
Left message  Again to rtrn call - I need to know if she needs order for test or just referral to dr Yoan Masters- and what fax number to send it to

## 2018-07-23 NOTE — TELEPHONE ENCOUNTER
Faxed both the referral and an order for us as have not been able to reach [facility]

## 2018-07-26 ENCOUNTER — HOSPITAL ENCOUNTER (OUTPATIENT)
Dept: VASCULAR LAB | Age: 73
Discharge: HOME | End: 2018-07-26
Attending: INTERNAL MEDICINE | Admitting: INTERNAL MEDICINE

## 2018-07-26 DIAGNOSIS — I77.9 BILATERAL CAROTID ARTERY DISEASE (HCC): Primary | ICD-10-CM

## 2018-07-26 DIAGNOSIS — I79.8 OTHER DISORDERS OF ARTERIES, ARTERIOLES AND CAPILLARIES IN DISEASES CLASSIFIED ELSEWHERE (HCC): ICD-10-CM

## 2018-08-13 RX ORDER — BLOOD SUGAR DIAGNOSTIC
STRIP MISCELLANEOUS
Qty: 200 STRIP | Refills: 2 | Status: SHIPPED | OUTPATIENT
Start: 2018-08-13 | End: 2019-07-25 | Stop reason: SDUPTHER

## 2018-08-13 RX ORDER — ATORVASTATIN CALCIUM 20 MG/1
TABLET, FILM COATED ORAL
Qty: 90 TABLET | Refills: 2 | Status: SHIPPED | OUTPATIENT
Start: 2018-08-13 | End: 2019-04-29 | Stop reason: SDUPTHER

## 2018-08-13 RX ORDER — GLIPIZIDE 5 MG/1
TABLET ORAL
Qty: 90 TABLET | Refills: 2 | Status: SHIPPED | OUTPATIENT
Start: 2018-08-13 | End: 2019-07-01 | Stop reason: SDUPTHER

## 2018-08-13 RX ORDER — METOPROLOL SUCCINATE 50 MG/1
TABLET, EXTENDED RELEASE ORAL
Qty: 180 TABLET | Refills: 2 | Status: SHIPPED | OUTPATIENT
Start: 2018-08-13 | End: 2019-04-29 | Stop reason: SDUPTHER

## 2018-08-13 RX ORDER — SITAGLIPTIN 100 MG/1
100 TABLET, FILM COATED ORAL DAILY
Qty: 90 TABLET | Refills: 2 | Status: SHIPPED | OUTPATIENT
Start: 2018-08-13 | End: 2019-02-04 | Stop reason: SDUPTHER

## 2018-08-24 ENCOUNTER — OFFICE VISIT (OUTPATIENT)
Dept: VASCULAR SURGERY | Age: 73
End: 2018-08-24

## 2018-08-24 VITALS
BODY MASS INDEX: 26.99 KG/M2 | SYSTOLIC BLOOD PRESSURE: 128 MMHG | WEIGHT: 162 LBS | HEIGHT: 65 IN | DIASTOLIC BLOOD PRESSURE: 70 MMHG

## 2018-08-24 DIAGNOSIS — I77.9 BILATERAL CAROTID ARTERY DISEASE (HCC): Primary | ICD-10-CM

## 2018-08-24 PROCEDURE — 1101F PT FALLS ASSESS-DOCD LE1/YR: CPT | Performed by: SURGERY

## 2018-08-24 PROCEDURE — 3017F COLORECTAL CA SCREEN DOC REV: CPT | Performed by: SURGERY

## 2018-08-24 PROCEDURE — 99203 OFFICE O/P NEW LOW 30 MIN: CPT | Performed by: SURGERY

## 2018-08-24 PROCEDURE — G8417 CALC BMI ABV UP PARAM F/U: HCPCS | Performed by: SURGERY

## 2018-08-24 PROCEDURE — G8427 DOCREV CUR MEDS BY ELIG CLIN: HCPCS | Performed by: SURGERY

## 2018-08-24 NOTE — PROGRESS NOTES
Outpatient Consultation / H&P    Date of Consultation:  8/24/2018    PCP:  Armand Escobedo MD     Referring Provider:  Dr. Candace Suggs     Chief Complaint:   Chief Complaint   Patient presents with    New Patient     here to discuss carotid artery stenosis ref SKYLAR Hopkins       History of Present Illness: We are asked to see this patient in consultation by Dr. Candace Suggs regarding Carotid stenosis. Mohan Thomas is a 74 y.o. male who has known asymptomatic carotid stenosis. Recent carotid duplex examination was performed showing some regressive disease. Patient has no history of CVA or TIA nor any symptoms consistent with TIA. He does have a history of hypertension hyperlipidemia and a history of prior coronary artery bypass. Past Medical History:  Past Medical History:   Diagnosis Date    DVT     Hyperlipidemia     Hypertension     Stenosis     subclavical    Type II or unspecified type diabetes mellitus without mention of complication, not stated as uncontrolled        Past Surgical History:  Past Surgical History:   Procedure Laterality Date    CORONARY ARTERY BYPASS GRAFT      TONSILLECTOMY AND ADENOIDECTOMY  1953       Home Medications:   Prior to Admission medications    Medication Sig Start Date End Date Taking?  Authorizing Provider   atorvastatin (LIPITOR) 20 MG tablet TAKE 1 TABLET BY MOUTH  DAILY 8/13/18  Yes JOSELITO Anderson   glipiZIDE (GLUCOTROL) 5 MG tablet TAKE 1 TABLET BY MOUTH  DAILY 8/13/18  Yes JOSELITO Trivedi   metoprolol succinate (TOPROL XL) 50 MG extended release tablet TAKE 1 TABLET BY MOUTH TWO  TIMES DAILY 8/13/18  Yes Monica Wilhelminia Brittle [place], [place]   JANUVIA 100 MG tablet TAKE 1 TABLET BY MOUTH  DAILY 8/13/18  Yes [place], [place]   ACCU-CHEK BRITNEY PLUS strip TEST TWO TIMES DAILY FOR  DIABETES 8/13/18  Yes [facility] [place]   [employer] ([address]) [address] [facility] tewst twice daily for diabetes e11.9 4/3/18  Yes Armand Escobedo MD   [employer] file     Social History Narrative    No narrative on file       Family History:    Family History   Problem Relation Age of Onset    Coronary Art Dis Mother     Hypertension Mother     Coronary Art Dis Father     Hypertension Father        Review of Systems:  A 14 point review of systems was completed. Pertinent positives identified in the HPI, all other review of systems negative. Physical Examination:    /70 (Site: Right Arm)   Ht 5' 5\" (1.651 m)   Wt 162 lb (73.5 kg)   BMI 26.96 kg/m²     Weight: 162 lb (73.5 kg)       General appearance: NAD  Eyes: PERRLA  Neck: no JVD, no lymphadenopathy. Respiratory: effort is unlabored, no crackles, wheezes or rubs. Cardiovascular: regular, no murmur. Left carotid bruits. No edema or varicosities. Pulses:    carotid brachial radial   RIGHT 2 2 2   LEFT 2 2 2   GI: abdomen soft, nondistended, no organomegaly. Musculoskeletal: strength and tone normal.  Extremities: warm and pink. Skin: no dermatitis or ulceration. Neuro/psychiatric: grossly intact. MEDICAL DECISION MAKING/TESTING      Carotid duplex: Personally reviewed. Peak systolic velocity of the left internal carotid artery is 325 cm/s with end-diastolic velocities of approximately 83 this would correspond a greater than 70% stenosis. Assessment:      Diagnosis Orders   1. Bilateral carotid artery disease ([address]       Worse on the left than the right. Recommendations/Plan:    I have recommended CT angiogram of the neck to further evaluate. If the degree of stenosis is greater than 80% then carotid endarterectomy would be warranted for stroke prevention.       Cole Salas MD, FACS

## 2018-08-27 RX ORDER — RAMIPRIL 10 MG/1
CAPSULE ORAL
Qty: 90 CAPSULE | Refills: 1 | Status: SHIPPED | OUTPATIENT
Start: 2018-08-27 | End: 2019-02-18 | Stop reason: ALTCHOICE

## 2018-08-27 RX ORDER — PEN NEEDLE, DIABETIC 29 G X1/2"
NEEDLE, DISPOSABLE MISCELLANEOUS
Qty: 100 EACH | Refills: 1 | Status: SHIPPED | OUTPATIENT
Start: 2018-08-27 | End: 2019-07-25 | Stop reason: SDUPTHER

## 2018-08-28 ENCOUNTER — TELEPHONE (OUTPATIENT)
Dept: VASCULAR SURGERY | Age: 73
End: 2018-08-28

## 2018-08-28 DIAGNOSIS — I10 ESSENTIAL HYPERTENSION: Primary | ICD-10-CM

## 2018-08-28 NOTE — TELEPHONE ENCOUNTER
Called patient with date and time of cta neck at [facility] for wed sept 5 2018 at 5:00pm/arrive at 4:30pm. NPO 4 hours prior. juventino

## 2018-08-29 ENCOUNTER — TELEPHONE (OUTPATIENT)
Dept: FAMILY MEDICINE CLINIC | Age: 73
End: 2018-08-29

## 2018-08-29 NOTE — TELEPHONE ENCOUNTER
Patient had swelling in left leg the beginning of the week, and now has it in both legs. No redness, not warm to the touch, does not seem like there is pitting, no SOB or other symptoms. I scheduled him for tomorrow with Dr. Kelly Pitt. He would like to know if there is anything he needs to do between now and then? Advised Dr. Kelly Pitt is out of the office, but would have clinical call him.

## 2018-08-30 ENCOUNTER — OFFICE VISIT (OUTPATIENT)
Dept: FAMILY MEDICINE CLINIC | Age: 73
End: 2018-08-30

## 2018-08-30 VITALS
HEIGHT: 65 IN | RESPIRATION RATE: 16 BRPM | BODY MASS INDEX: 27.06 KG/M2 | DIASTOLIC BLOOD PRESSURE: 74 MMHG | OXYGEN SATURATION: 97 % | SYSTOLIC BLOOD PRESSURE: 142 MMHG | HEART RATE: 89 BPM | WEIGHT: 162.4 LBS

## 2018-08-30 DIAGNOSIS — I87.8 VENOUS STASIS: ICD-10-CM

## 2018-08-30 PROCEDURE — 1036F TOBACCO NON-USER: CPT | Performed by: INTERNAL MEDICINE

## 2018-08-30 PROCEDURE — 4040F PNEUMOC VAC/ADMIN/RCVD: CPT | Performed by: INTERNAL MEDICINE

## 2018-08-30 PROCEDURE — G8417 CALC BMI ABV UP PARAM F/U: HCPCS | Performed by: INTERNAL MEDICINE

## 2018-08-30 PROCEDURE — G8428 CUR MEDS NOT DOCUMENT: HCPCS | Performed by: INTERNAL MEDICINE

## 2018-08-30 PROCEDURE — 1123F ACP DISCUSS/DSCN MKR DOCD: CPT | Performed by: INTERNAL MEDICINE

## 2018-08-30 PROCEDURE — 99213 OFFICE O/P EST LOW 20 MIN: CPT | Performed by: INTERNAL MEDICINE

## 2018-08-30 PROCEDURE — G8598 ASA/ANTIPLAT THER USED: HCPCS | Performed by: INTERNAL MEDICINE

## 2018-08-30 PROCEDURE — 1101F PT FALLS ASSESS-DOCD LE1/YR: CPT | Performed by: INTERNAL MEDICINE

## 2018-08-30 PROCEDURE — 3017F COLORECTAL CA SCREEN DOC REV: CPT | Performed by: INTERNAL MEDICINE

## 2018-08-30 ASSESSMENT — ENCOUNTER SYMPTOMS
RESPIRATORY NEGATIVE: 1
ALLERGIC/IMMUNOLOGIC NEGATIVE: 1
GASTROINTESTINAL NEGATIVE: 1

## 2018-08-30 NOTE — PATIENT INSTRUCTIONS
Venous Stasis  - Will restrict salt. - To wear 20-30 mm knee high compression stockings. - elevate feet when sitting.  - f/u w/ Vascular MD for carotid test as ordered.

## 2018-09-05 ENCOUNTER — HOSPITAL ENCOUNTER (OUTPATIENT)
Dept: CT IMAGING | Age: 73
Discharge: HOME | End: 2018-09-05
Attending: SURGERY | Admitting: SURGERY

## 2018-09-05 DIAGNOSIS — I77.9 BILATERAL CAROTID ARTERY DISEASE (HCC): ICD-10-CM

## 2018-09-05 DIAGNOSIS — I77.9 DISORDER OF ARTERY OR ARTERIOLE (HCC): ICD-10-CM

## 2018-09-05 LAB
ANION GAP SERPL CALCULATED.3IONS-SCNC: 11 MMOL/L (ref 3–16)
BUN BLDV-MCNC: 25 MG/DL (ref 7–20)
CALCIUM SERPL-MCNC: 9.4 MG/DL (ref 8.3–10.6)
CHLORIDE BLD-SCNC: 105 MMOL/L (ref 99–110)
CO2: 23 MMOL/L (ref 21–32)
CREAT SERPL-MCNC: 0.9 MG/DL (ref 0.8–1.3)
GFR AFRICAN AMERICAN: >60
GFR NON-AFRICAN AMERICAN: >60
GLUCOSE BLD-MCNC: 152 MG/DL (ref 70–99)
POTASSIUM SERPL-SCNC: 4.5 MMOL/L (ref 3.5–5.1)
SODIUM BLD-SCNC: 139 MMOL/L (ref 136–145)

## 2018-09-06 ENCOUNTER — TELEPHONE (OUTPATIENT)
Dept: FAMILY MEDICINE CLINIC | Age: 73
End: 2018-09-06

## 2018-09-06 ENCOUNTER — TELEPHONE (OUTPATIENT)
Dept: VASCULAR SURGERY | Age: 73
End: 2018-09-06

## 2018-09-06 NOTE — TELEPHONE ENCOUNTER
Pt would like you to look at ct angiogram done by Dr Jaswinder Vazquez and give your impression.  Will not make any decisions until you review

## 2018-09-07 ENCOUNTER — TELEPHONE (OUTPATIENT)
Dept: VASCULAR SURGERY | Age: 73
End: 2018-09-07

## 2018-09-07 ENCOUNTER — HOSPITAL ENCOUNTER (OUTPATIENT)
Age: 73
Discharge: HOME | DRG: 254 | End: 2018-09-07
Payer: MEDICARE

## 2018-09-07 ENCOUNTER — SURG/PROC ORDERS (OUTPATIENT)
Dept: VASCULAR SURGERY | Age: 73
End: 2018-09-07

## 2018-09-07 DIAGNOSIS — I65.22 OCCLUSION OF LEFT CAROTID ARTERY: Primary | ICD-10-CM

## 2018-09-07 LAB
ANION GAP SERPL CALCULATED.3IONS-SCNC: 12 MMOL/L (ref 3–16)
BASOPHILS ABSOLUTE: 0 K/UL (ref 0–0.2)
BASOPHILS RELATIVE PERCENT: 0.5 %
BILIRUBIN URINE: NEGATIVE
BLOOD, URINE: NEGATIVE
BUN BLDV-MCNC: 28 MG/DL (ref 7–20)
CALCIUM SERPL-MCNC: 9.4 MG/DL (ref 8.3–10.6)
CHLORIDE BLD-SCNC: 105 MMOL/L (ref 99–110)
CLARITY: CLEAR
CO2: 23 MMOL/L (ref 21–32)
COLOR: YELLOW
CREAT SERPL-MCNC: 0.9 MG/DL (ref 0.8–1.3)
EKG ATRIAL RATE: 77 BPM
EKG DIAGNOSIS: NORMAL
EKG Q-T INTERVAL: 388 MS
EKG QRS DURATION: 96 MS
EKG QTC CALCULATION (BAZETT): 439 MS
EKG R AXIS: -48 DEGREES
EKG T AXIS: 113 DEGREES
EKG VENTRICULAR RATE: 77 BPM
EOSINOPHILS ABSOLUTE: 0.1 K/UL (ref 0–0.6)
EOSINOPHILS RELATIVE PERCENT: 1.7 %
GFR AFRICAN AMERICAN: >60
GFR NON-AFRICAN AMERICAN: >60
GLUCOSE BLD-MCNC: 105 MG/DL (ref 70–99)
GLUCOSE URINE: NEGATIVE MG/DL
HCT VFR BLD CALC: 42.1 % (ref 40.5–52.5)
HEMOGLOBIN: 13.9 G/DL (ref 13.5–17.5)
KETONES, URINE: NEGATIVE MG/DL
LEUKOCYTE ESTERASE, URINE: NEGATIVE
LYMPHOCYTES ABSOLUTE: 1.5 K/UL (ref 1–5.1)
LYMPHOCYTES RELATIVE PERCENT: 25.9 %
MAGNESIUM: 2.5 MG/DL (ref 1.8–2.4)
MCH RBC QN AUTO: 28.1 PG (ref 26–34)
MCHC RBC AUTO-ENTMCNC: 32.9 G/DL (ref 31–36)
MCV RBC AUTO: 85.4 FL (ref 80–100)
MICROSCOPIC EXAMINATION: NORMAL
MONOCYTES ABSOLUTE: 0.6 K/UL (ref 0–1.3)
MONOCYTES RELATIVE PERCENT: 9.5 %
NEUTROPHILS ABSOLUTE: 3.7 K/UL (ref 1.7–7.7)
NEUTROPHILS RELATIVE PERCENT: 62.4 %
NITRITE, URINE: NEGATIVE
PDW BLD-RTO: 14.5 % (ref 12.4–15.4)
PH UA: 8
PLATELET # BLD: 132 K/UL (ref 135–450)
PMV BLD AUTO: 9 FL (ref 5–10.5)
POTASSIUM SERPL-SCNC: 4.3 MMOL/L (ref 3.5–5.1)
PROTEIN UA: NEGATIVE MG/DL
RBC # BLD: 4.93 M/UL (ref 4.2–5.9)
SODIUM BLD-SCNC: 140 MMOL/L (ref 136–145)
SPECIFIC GRAVITY UA: 1.01
URINE TYPE: NORMAL
UROBILINOGEN, URINE: 0.2 E.U./DL
WBC # BLD: 5.9 K/UL (ref 4–11)

## 2018-09-07 PROCEDURE — 85025 COMPLETE CBC W/AUTO DIFF WBC: CPT

## 2018-09-07 PROCEDURE — 93005 ELECTROCARDIOGRAM TRACING: CPT

## 2018-09-07 PROCEDURE — 83735 ASSAY OF MAGNESIUM: CPT

## 2018-09-07 PROCEDURE — 81003 URINALYSIS AUTO W/O SCOPE: CPT

## 2018-09-07 PROCEDURE — 80048 BASIC METABOLIC PNL TOTAL CA: CPT

## 2018-09-07 PROCEDURE — 36415 COLL VENOUS BLD VENIPUNCTURE: CPT

## 2018-09-10 ENCOUNTER — HOSPITAL ENCOUNTER (INPATIENT)
Age: 73
LOS: 1 days | Discharge: HOME | DRG: 254 | End: 2018-09-11
Attending: SURGERY | Admitting: SURGERY
Payer: MEDICARE

## 2018-09-10 ENCOUNTER — ANESTHESIA (OUTPATIENT)
Dept: OPERATING ROOM | Age: 73
DRG: 254 | End: 2018-09-10
Payer: MEDICARE

## 2018-09-10 ENCOUNTER — ANESTHESIA EVENT (OUTPATIENT)
Dept: OPERATING ROOM | Age: 73
DRG: 254 | End: 2018-09-10
Payer: MEDICARE

## 2018-09-10 VITALS — SYSTOLIC BLOOD PRESSURE: 205 MMHG | DIASTOLIC BLOOD PRESSURE: 77 MMHG | TEMPERATURE: 98.6 F | OXYGEN SATURATION: 100 %

## 2018-09-10 DIAGNOSIS — I65.22 LEFT CAROTID ARTERY STENOSIS: Primary | ICD-10-CM

## 2018-09-10 LAB
GLUCOSE BLD-MCNC: 135 MG/DL (ref 70–99)
GLUCOSE BLD-MCNC: 184 MG/DL (ref 70–99)
GLUCOSE BLD-MCNC: 207 MG/DL (ref 70–99)
GLUCOSE BLD-MCNC: 282 MG/DL (ref 70–99)
PERFORMED ON: ABNORMAL

## 2018-09-10 PROCEDURE — 3700000000 HC ANESTHESIA ATTENDED CARE: Performed by: SURGERY

## 2018-09-10 PROCEDURE — 2500000003 HC RX 250 WO HCPCS: Performed by: NURSE ANESTHETIST, CERTIFIED REGISTERED

## 2018-09-10 PROCEDURE — 2709999900 HC NON-CHARGEABLE SUPPLY: Performed by: SURGERY

## 2018-09-10 PROCEDURE — 2060000000 HC ICU INTERMEDIATE R&B

## 2018-09-10 PROCEDURE — 6370000000 HC RX 637 (ALT 250 FOR IP): Performed by: SURGERY

## 2018-09-10 PROCEDURE — 2580000003 HC RX 258: Performed by: ANESTHESIOLOGY

## 2018-09-10 PROCEDURE — 3600000007 HC SURGERY HYBRID BASE: Performed by: SURGERY

## 2018-09-10 PROCEDURE — 03CN0ZZ EXTIRPATION OF MATTER FROM LEFT EXTERNAL CAROTID ARTERY, OPEN APPROACH: ICD-10-PCS | Performed by: SURGERY

## 2018-09-10 PROCEDURE — 6360000002 HC RX W HCPCS: Performed by: SURGERY

## 2018-09-10 PROCEDURE — 2500000003 HC RX 250 WO HCPCS: Performed by: SURGERY

## 2018-09-10 PROCEDURE — 36415 COLL VENOUS BLD VENIPUNCTURE: CPT

## 2018-09-10 PROCEDURE — 35301 RECHANNELING OF ARTERY: CPT | Performed by: SURGERY

## 2018-09-10 PROCEDURE — 2580000003 HC RX 258: Performed by: SURGERY

## 2018-09-10 PROCEDURE — 3700000001 HC ADD 15 MINUTES (ANESTHESIA): Performed by: SURGERY

## 2018-09-10 PROCEDURE — 03CJ0ZZ EXTIRPATION OF MATTER FROM LEFT COMMON CAROTID ARTERY, OPEN APPROACH: ICD-10-PCS | Performed by: SURGERY

## 2018-09-10 PROCEDURE — 2780000010 HC IMPLANT OTHER: Performed by: SURGERY

## 2018-09-10 PROCEDURE — 03CL0ZZ EXTIRPATION OF MATTER FROM LEFT INTERNAL CAROTID ARTERY, OPEN APPROACH: ICD-10-PCS | Performed by: SURGERY

## 2018-09-10 PROCEDURE — 83036 HEMOGLOBIN GLYCOSYLATED A1C: CPT

## 2018-09-10 PROCEDURE — 7100000001 HC PACU RECOVERY - ADDTL 15 MIN: Performed by: SURGERY

## 2018-09-10 PROCEDURE — 03BL0ZZ EXCISION OF LEFT INTERNAL CAROTID ARTERY, OPEN APPROACH: ICD-10-PCS | Performed by: SURGERY

## 2018-09-10 PROCEDURE — 3600000017 HC SURGERY HYBRID ADDL 15MIN: Performed by: SURGERY

## 2018-09-10 PROCEDURE — 6360000002 HC RX W HCPCS: Performed by: ANESTHESIOLOGY

## 2018-09-10 PROCEDURE — 6360000002 HC RX W HCPCS: Performed by: NURSE ANESTHETIST, CERTIFIED REGISTERED

## 2018-09-10 PROCEDURE — 7100000000 HC PACU RECOVERY - FIRST 15 MIN: Performed by: SURGERY

## 2018-09-10 RX ORDER — MORPHINE SULFATE 4 MG/ML
4 INJECTION, SOLUTION INTRAMUSCULAR; INTRAVENOUS
Status: DISCONTINUED | OUTPATIENT
Start: 2018-09-10 | End: 2018-09-11 | Stop reason: HOSPADM

## 2018-09-10 RX ORDER — MORPHINE SULFATE 2 MG/ML
2 INJECTION, SOLUTION INTRAMUSCULAR; INTRAVENOUS
Status: DISCONTINUED | OUTPATIENT
Start: 2018-09-10 | End: 2018-09-11 | Stop reason: HOSPADM

## 2018-09-10 RX ORDER — ONDANSETRON 2 MG/ML
4 INJECTION INTRAMUSCULAR; INTRAVENOUS EVERY 10 MIN PRN
Status: DISCONTINUED | OUTPATIENT
Start: 2018-09-10 | End: 2018-09-10 | Stop reason: HOSPADM

## 2018-09-10 RX ORDER — SODIUM CHLORIDE 9 MG/ML
INJECTION, SOLUTION INTRAVENOUS CONTINUOUS
Status: DISCONTINUED | OUTPATIENT
Start: 2018-09-10 | End: 2018-09-11 | Stop reason: HOSPADM

## 2018-09-10 RX ORDER — NITROGLYCERIN 20 MG/100ML
5 INJECTION INTRAVENOUS CONTINUOUS PRN
Status: DISCONTINUED | OUTPATIENT
Start: 2018-09-10 | End: 2018-09-11 | Stop reason: HOSPADM

## 2018-09-10 RX ORDER — ASPIRIN 81 MG/1
81 TABLET ORAL DAILY
Status: DISCONTINUED | OUTPATIENT
Start: 2018-09-10 | End: 2018-09-11 | Stop reason: HOSPADM

## 2018-09-10 RX ORDER — DEXTROSE MONOHYDRATE 25 G/50ML
12.5 INJECTION, SOLUTION INTRAVENOUS PRN
Status: DISCONTINUED | OUTPATIENT
Start: 2018-09-10 | End: 2018-09-11 | Stop reason: HOSPADM

## 2018-09-10 RX ORDER — RAMIPRIL 5 MG/1
10 CAPSULE ORAL NIGHTLY
Status: DISCONTINUED | OUTPATIENT
Start: 2018-09-10 | End: 2018-09-11 | Stop reason: HOSPADM

## 2018-09-10 RX ORDER — OXYCODONE HYDROCHLORIDE AND ACETAMINOPHEN 5; 325 MG/1; MG/1
2 TABLET ORAL PRN
Status: DISCONTINUED | OUTPATIENT
Start: 2018-09-10 | End: 2018-09-10 | Stop reason: HOSPADM

## 2018-09-10 RX ORDER — RAMIPRIL 5 MG/1
5 CAPSULE ORAL EVERY MORNING
Status: DISCONTINUED | OUTPATIENT
Start: 2018-09-11 | End: 2018-09-11 | Stop reason: HOSPADM

## 2018-09-10 RX ORDER — ATORVASTATIN CALCIUM 10 MG/1
20 TABLET, FILM COATED ORAL NIGHTLY
Status: DISCONTINUED | OUTPATIENT
Start: 2018-09-10 | End: 2018-09-11 | Stop reason: HOSPADM

## 2018-09-10 RX ORDER — PROTAMINE SULFATE 10 MG/ML
INJECTION, SOLUTION INTRAVENOUS PRN
Status: DISCONTINUED | OUTPATIENT
Start: 2018-09-10 | End: 2018-09-10 | Stop reason: SDUPTHER

## 2018-09-10 RX ORDER — PROPOFOL 10 MG/ML
INJECTION, EMULSION INTRAVENOUS PRN
Status: DISCONTINUED | OUTPATIENT
Start: 2018-09-10 | End: 2018-09-10 | Stop reason: SDUPTHER

## 2018-09-10 RX ORDER — DEXAMETHASONE SODIUM PHOSPHATE 4 MG/ML
INJECTION, SOLUTION INTRA-ARTICULAR; INTRALESIONAL; INTRAMUSCULAR; INTRAVENOUS; SOFT TISSUE PRN
Status: DISCONTINUED | OUTPATIENT
Start: 2018-09-10 | End: 2018-09-10 | Stop reason: SDUPTHER

## 2018-09-10 RX ORDER — HEPARIN SODIUM 1000 [USP'U]/ML
INJECTION, SOLUTION INTRAVENOUS; SUBCUTANEOUS PRN
Status: DISCONTINUED | OUTPATIENT
Start: 2018-09-10 | End: 2018-09-10 | Stop reason: SDUPTHER

## 2018-09-10 RX ORDER — LIDOCAINE HYDROCHLORIDE 10 MG/ML
0.3 INJECTION, SOLUTION EPIDURAL; INFILTRATION; INTRACAUDAL; PERINEURAL
Status: DISCONTINUED | OUTPATIENT
Start: 2018-09-10 | End: 2018-09-10 | Stop reason: HOSPADM

## 2018-09-10 RX ORDER — GLIPIZIDE 5 MG/1
5 TABLET ORAL
Status: DISCONTINUED | OUTPATIENT
Start: 2018-09-11 | End: 2018-09-11 | Stop reason: HOSPADM

## 2018-09-10 RX ORDER — HYDROCODONE BITARTRATE AND ACETAMINOPHEN 5; 325 MG/1; MG/1
1 TABLET ORAL EVERY 4 HOURS PRN
Status: DISCONTINUED | OUTPATIENT
Start: 2018-09-10 | End: 2018-09-11 | Stop reason: HOSPADM

## 2018-09-10 RX ORDER — NICOTINE POLACRILEX 4 MG
15 LOZENGE BUCCAL PRN
Status: DISCONTINUED | OUTPATIENT
Start: 2018-09-10 | End: 2018-09-11 | Stop reason: HOSPADM

## 2018-09-10 RX ORDER — ROCURONIUM BROMIDE 10 MG/ML
INJECTION, SOLUTION INTRAVENOUS PRN
Status: DISCONTINUED | OUTPATIENT
Start: 2018-09-10 | End: 2018-09-10 | Stop reason: SDUPTHER

## 2018-09-10 RX ORDER — CLONIDINE HYDROCHLORIDE 0.1 MG/1
0.1 TABLET ORAL
Status: DISCONTINUED | OUTPATIENT
Start: 2018-09-10 | End: 2018-09-11 | Stop reason: HOSPADM

## 2018-09-10 RX ORDER — DEXTROSE MONOHYDRATE 50 MG/ML
100 INJECTION, SOLUTION INTRAVENOUS PRN
Status: DISCONTINUED | OUTPATIENT
Start: 2018-09-10 | End: 2018-09-11 | Stop reason: HOSPADM

## 2018-09-10 RX ORDER — ONDANSETRON 2 MG/ML
4 INJECTION INTRAMUSCULAR; INTRAVENOUS EVERY 6 HOURS PRN
Status: DISCONTINUED | OUTPATIENT
Start: 2018-09-10 | End: 2018-09-11 | Stop reason: HOSPADM

## 2018-09-10 RX ORDER — MEPERIDINE HYDROCHLORIDE 50 MG/ML
12.5 INJECTION INTRAMUSCULAR; INTRAVENOUS; SUBCUTANEOUS EVERY 5 MIN PRN
Status: DISCONTINUED | OUTPATIENT
Start: 2018-09-10 | End: 2018-09-10 | Stop reason: HOSPADM

## 2018-09-10 RX ORDER — HYDRALAZINE HYDROCHLORIDE 20 MG/ML
5 INJECTION INTRAMUSCULAR; INTRAVENOUS EVERY 10 MIN PRN
Status: DISCONTINUED | OUTPATIENT
Start: 2018-09-10 | End: 2018-09-10 | Stop reason: HOSPADM

## 2018-09-10 RX ORDER — LABETALOL HYDROCHLORIDE 5 MG/ML
5 INJECTION, SOLUTION INTRAVENOUS EVERY 10 MIN PRN
Status: DISCONTINUED | OUTPATIENT
Start: 2018-09-10 | End: 2018-09-10 | Stop reason: HOSPADM

## 2018-09-10 RX ORDER — SODIUM CHLORIDE, SODIUM LACTATE, POTASSIUM CHLORIDE, CALCIUM CHLORIDE 600; 310; 30; 20 MG/100ML; MG/100ML; MG/100ML; MG/100ML
INJECTION, SOLUTION INTRAVENOUS CONTINUOUS
Status: DISCONTINUED | OUTPATIENT
Start: 2018-09-10 | End: 2018-09-10

## 2018-09-10 RX ORDER — ACETAMINOPHEN 325 MG/1
650 TABLET ORAL EVERY 4 HOURS PRN
Status: DISCONTINUED | OUTPATIENT
Start: 2018-09-10 | End: 2018-09-11 | Stop reason: HOSPADM

## 2018-09-10 RX ORDER — SODIUM CHLORIDE 0.9 % (FLUSH) 0.9 %
10 SYRINGE (ML) INJECTION EVERY 12 HOURS SCHEDULED
Status: DISCONTINUED | OUTPATIENT
Start: 2018-09-10 | End: 2018-09-10 | Stop reason: HOSPADM

## 2018-09-10 RX ORDER — OXYCODONE HYDROCHLORIDE AND ACETAMINOPHEN 5; 325 MG/1; MG/1
1 TABLET ORAL PRN
Status: DISCONTINUED | OUTPATIENT
Start: 2018-09-10 | End: 2018-09-10 | Stop reason: HOSPADM

## 2018-09-10 RX ORDER — HYDROCODONE BITARTRATE AND ACETAMINOPHEN 5; 325 MG/1; MG/1
2 TABLET ORAL EVERY 4 HOURS PRN
Status: DISCONTINUED | OUTPATIENT
Start: 2018-09-10 | End: 2018-09-11 | Stop reason: HOSPADM

## 2018-09-10 RX ORDER — SUCCINYLCHOLINE/SOD CL,ISO/PF 100 MG/5ML
SYRINGE (ML) INTRAVENOUS PRN
Status: DISCONTINUED | OUTPATIENT
Start: 2018-09-10 | End: 2018-09-10 | Stop reason: SDUPTHER

## 2018-09-10 RX ORDER — EPHEDRINE SULFATE 50 MG/ML
INJECTION INTRAVENOUS PRN
Status: DISCONTINUED | OUTPATIENT
Start: 2018-09-10 | End: 2018-09-10 | Stop reason: SDUPTHER

## 2018-09-10 RX ORDER — METOPROLOL SUCCINATE 50 MG/1
50 TABLET, EXTENDED RELEASE ORAL DAILY
Status: DISCONTINUED | OUTPATIENT
Start: 2018-09-10 | End: 2018-09-11 | Stop reason: HOSPADM

## 2018-09-10 RX ORDER — ONDANSETRON 2 MG/ML
INJECTION INTRAMUSCULAR; INTRAVENOUS PRN
Status: DISCONTINUED | OUTPATIENT
Start: 2018-09-10 | End: 2018-09-10 | Stop reason: SDUPTHER

## 2018-09-10 RX ORDER — LIDOCAINE HYDROCHLORIDE 10 MG/ML
INJECTION, SOLUTION INFILTRATION; PERINEURAL PRN
Status: DISCONTINUED | OUTPATIENT
Start: 2018-09-10 | End: 2018-09-10 | Stop reason: SDUPTHER

## 2018-09-10 RX ORDER — SODIUM CHLORIDE 0.9 % (FLUSH) 0.9 %
10 SYRINGE (ML) INJECTION EVERY 12 HOURS SCHEDULED
Status: DISCONTINUED | OUTPATIENT
Start: 2018-09-10 | End: 2018-09-11 | Stop reason: HOSPADM

## 2018-09-10 RX ORDER — FENTANYL CITRATE 50 UG/ML
INJECTION, SOLUTION INTRAMUSCULAR; INTRAVENOUS PRN
Status: DISCONTINUED | OUTPATIENT
Start: 2018-09-10 | End: 2018-09-10 | Stop reason: SDUPTHER

## 2018-09-10 RX ORDER — SODIUM CHLORIDE 0.9 % (FLUSH) 0.9 %
10 SYRINGE (ML) INJECTION PRN
Status: DISCONTINUED | OUTPATIENT
Start: 2018-09-10 | End: 2018-09-11 | Stop reason: HOSPADM

## 2018-09-10 RX ORDER — INSULIN GLARGINE 100 [IU]/ML
10 INJECTION, SOLUTION SUBCUTANEOUS NIGHTLY
Status: DISCONTINUED | OUTPATIENT
Start: 2018-09-10 | End: 2018-09-11 | Stop reason: HOSPADM

## 2018-09-10 RX ORDER — HYDRALAZINE HYDROCHLORIDE 20 MG/ML
10 INJECTION INTRAMUSCULAR; INTRAVENOUS
Status: DISCONTINUED | OUTPATIENT
Start: 2018-09-10 | End: 2018-09-11 | Stop reason: HOSPADM

## 2018-09-10 RX ORDER — SODIUM CHLORIDE 0.9 % (FLUSH) 0.9 %
10 SYRINGE (ML) INJECTION PRN
Status: DISCONTINUED | OUTPATIENT
Start: 2018-09-10 | End: 2018-09-10 | Stop reason: HOSPADM

## 2018-09-10 RX ADMIN — PHENYLEPHRINE HYDROCHLORIDE 50 MCG: 10 INJECTION INTRAVENOUS at 08:57

## 2018-09-10 RX ADMIN — PHENYLEPHRINE HYDROCHLORIDE 50 MCG: 10 INJECTION INTRAVENOUS at 09:01

## 2018-09-10 RX ADMIN — Medication 2 G: at 07:41

## 2018-09-10 RX ADMIN — HYDROMORPHONE HYDROCHLORIDE 0.25 MG: 1 INJECTION, SOLUTION INTRAMUSCULAR; INTRAVENOUS; SUBCUTANEOUS at 10:26

## 2018-09-10 RX ADMIN — Medication 100 MG: at 07:35

## 2018-09-10 RX ADMIN — LIDOCAINE HYDROCHLORIDE 40 MG: 10 INJECTION, SOLUTION INFILTRATION; PERINEURAL at 07:35

## 2018-09-10 RX ADMIN — PHENYLEPHRINE HYDROCHLORIDE 50 MCG: 10 INJECTION INTRAVENOUS at 08:40

## 2018-09-10 RX ADMIN — INSULIN LISPRO 4 UNITS: 100 INJECTION, SOLUTION INTRAVENOUS; SUBCUTANEOUS at 17:36

## 2018-09-10 RX ADMIN — HEPARIN SODIUM 5000 UNITS: 1000 INJECTION, SOLUTION INTRAVENOUS; SUBCUTANEOUS at 08:16

## 2018-09-10 RX ADMIN — SODIUM CHLORIDE: 9 INJECTION, SOLUTION INTRAVENOUS at 17:37

## 2018-09-10 RX ADMIN — ROCURONIUM BROMIDE 5 MG: 10 SOLUTION INTRAVENOUS at 07:35

## 2018-09-10 RX ADMIN — HYDROMORPHONE HYDROCHLORIDE 0.5 MG: 1 INJECTION, SOLUTION INTRAMUSCULAR; INTRAVENOUS; SUBCUTANEOUS at 11:06

## 2018-09-10 RX ADMIN — PHENYLEPHRINE HYDROCHLORIDE 100 MCG: 10 INJECTION INTRAVENOUS at 08:21

## 2018-09-10 RX ADMIN — PROTAMINE SULFATE 25 MG: 10 INJECTION, SOLUTION INTRAVENOUS at 09:02

## 2018-09-10 RX ADMIN — SODIUM CHLORIDE, POTASSIUM CHLORIDE, SODIUM LACTATE AND CALCIUM CHLORIDE: 600; 310; 30; 20 INJECTION, SOLUTION INTRAVENOUS at 07:11

## 2018-09-10 RX ADMIN — PHENYLEPHRINE HYDROCHLORIDE 50 MCG: 10 INJECTION INTRAVENOUS at 08:47

## 2018-09-10 RX ADMIN — ATORVASTATIN CALCIUM 20 MG: 10 TABLET, FILM COATED ORAL at 21:30

## 2018-09-10 RX ADMIN — SUGAMMADEX 200 MG: 100 INJECTION, SOLUTION INTRAVENOUS at 09:20

## 2018-09-10 RX ADMIN — HYDROCODONE BITARTRATE AND ACETAMINOPHEN 1 TABLET: 5; 325 TABLET ORAL at 21:30

## 2018-09-10 RX ADMIN — EPHEDRINE SULFATE 5 MG: 50 INJECTION INTRAVENOUS at 08:01

## 2018-09-10 RX ADMIN — PHENYLEPHRINE HYDROCHLORIDE 50 MCG: 10 INJECTION INTRAVENOUS at 08:30

## 2018-09-10 RX ADMIN — PROPOFOL 150 MG: 10 INJECTION, EMULSION INTRAVENOUS at 07:35

## 2018-09-10 RX ADMIN — EPHEDRINE SULFATE 10 MG: 50 INJECTION INTRAVENOUS at 07:40

## 2018-09-10 RX ADMIN — ONDANSETRON 4 MG: 2 INJECTION INTRAMUSCULAR; INTRAVENOUS at 07:57

## 2018-09-10 RX ADMIN — HYDROMORPHONE HYDROCHLORIDE 0.25 MG: 1 INJECTION, SOLUTION INTRAMUSCULAR; INTRAVENOUS; SUBCUTANEOUS at 10:18

## 2018-09-10 RX ADMIN — ROCURONIUM BROMIDE 10 MG: 10 SOLUTION INTRAVENOUS at 08:12

## 2018-09-10 RX ADMIN — FENTANYL CITRATE 50 MCG: 50 INJECTION INTRAMUSCULAR; INTRAVENOUS at 08:10

## 2018-09-10 RX ADMIN — PHENYLEPHRINE HYDROCHLORIDE 50 MCG: 10 INJECTION INTRAVENOUS at 09:08

## 2018-09-10 RX ADMIN — ASPIRIN 81 MG: 81 TABLET, COATED ORAL at 17:36

## 2018-09-10 RX ADMIN — ROCURONIUM BROMIDE 25 MG: 10 SOLUTION INTRAVENOUS at 07:45

## 2018-09-10 RX ADMIN — INSULIN LISPRO 4 UNITS: 100 INJECTION, SOLUTION INTRAVENOUS; SUBCUTANEOUS at 21:52

## 2018-09-10 RX ADMIN — DEXAMETHASONE SODIUM PHOSPHATE 4 MG: 4 INJECTION, SOLUTION INTRAMUSCULAR; INTRAVENOUS at 07:57

## 2018-09-10 RX ADMIN — PHENYLEPHRINE HYDROCHLORIDE 50 MCG: 10 INJECTION INTRAVENOUS at 09:04

## 2018-09-10 RX ADMIN — PHENYLEPHRINE HYDROCHLORIDE 100 MCG: 10 INJECTION INTRAVENOUS at 08:04

## 2018-09-10 RX ADMIN — INSULIN GLARGINE 10 UNITS: 100 INJECTION, SOLUTION SUBCUTANEOUS at 21:52

## 2018-09-10 RX ADMIN — PHENYLEPHRINE HYDROCHLORIDE 100 MCG: 10 INJECTION INTRAVENOUS at 07:44

## 2018-09-10 RX ADMIN — FENTANYL CITRATE 100 MCG: 50 INJECTION INTRAMUSCULAR; INTRAVENOUS at 07:35

## 2018-09-10 RX ADMIN — EPHEDRINE SULFATE 5 MG: 50 INJECTION INTRAVENOUS at 08:26

## 2018-09-10 RX ADMIN — PHENYLEPHRINE HYDROCHLORIDE 50 MCG: 10 INJECTION INTRAVENOUS at 09:11

## 2018-09-10 RX ADMIN — HYDROMORPHONE HYDROCHLORIDE 0.5 MG: 1 INJECTION, SOLUTION INTRAMUSCULAR; INTRAVENOUS; SUBCUTANEOUS at 10:32

## 2018-09-10 RX ADMIN — PHENYLEPHRINE HYDROCHLORIDE 50 MCG: 10 INJECTION INTRAVENOUS at 09:14

## 2018-09-10 RX ADMIN — RAMIPRIL 10 MG: 5 CAPSULE ORAL at 21:30

## 2018-09-10 ASSESSMENT — PULMONARY FUNCTION TESTS
PIF_VALUE: 20
PIF_VALUE: 19
PIF_VALUE: 16
PIF_VALUE: 20
PIF_VALUE: 2
PIF_VALUE: 20
PIF_VALUE: 18
PIF_VALUE: 21
PIF_VALUE: 20
PIF_VALUE: 0
PIF_VALUE: 20
PIF_VALUE: 21
PIF_VALUE: 19
PIF_VALUE: 23
PIF_VALUE: 21
PIF_VALUE: 10
PIF_VALUE: 20
PIF_VALUE: 19
PIF_VALUE: 20
PIF_VALUE: 0
PIF_VALUE: 20
PIF_VALUE: 19
PIF_VALUE: 21
PIF_VALUE: 20
PIF_VALUE: 0
PIF_VALUE: 21
PIF_VALUE: 21
PIF_VALUE: 20
PIF_VALUE: 0
PIF_VALUE: 20
PIF_VALUE: 20
PIF_VALUE: 21
PIF_VALUE: 21
PIF_VALUE: 20
PIF_VALUE: 19
PIF_VALUE: 20
PIF_VALUE: 20
PIF_VALUE: 19
PIF_VALUE: 21
PIF_VALUE: 20
PIF_VALUE: 0
PIF_VALUE: 20
PIF_VALUE: 21
PIF_VALUE: 20
PIF_VALUE: 19
PIF_VALUE: 20
PIF_VALUE: 0
PIF_VALUE: 20
PIF_VALUE: 1
PIF_VALUE: 0
PIF_VALUE: 20
PIF_VALUE: 20
PIF_VALUE: 21
PIF_VALUE: 10
PIF_VALUE: 0
PIF_VALUE: 18
PIF_VALUE: 21
PIF_VALUE: 21
PIF_VALUE: 22
PIF_VALUE: 21
PIF_VALUE: 20
PIF_VALUE: 20
PIF_VALUE: 19
PIF_VALUE: 0
PIF_VALUE: 20
PIF_VALUE: 19
PIF_VALUE: 15
PIF_VALUE: 21
PIF_VALUE: 20
PIF_VALUE: 20
PIF_VALUE: 21
PIF_VALUE: 16
PIF_VALUE: 20
PIF_VALUE: 0
PIF_VALUE: 21
PIF_VALUE: 0
PIF_VALUE: 20
PIF_VALUE: 0
PIF_VALUE: 20
PIF_VALUE: 21
PIF_VALUE: 19
PIF_VALUE: 20
PIF_VALUE: 20
PIF_VALUE: 21
PIF_VALUE: 20
PIF_VALUE: 21
PIF_VALUE: 22
PIF_VALUE: 21
PIF_VALUE: 0
PIF_VALUE: 20
PIF_VALUE: 19
PIF_VALUE: 21
PIF_VALUE: 20
PIF_VALUE: 21
PIF_VALUE: 0
PIF_VALUE: 20
PIF_VALUE: 18

## 2018-09-10 ASSESSMENT — PAIN SCALES - GENERAL
PAINLEVEL_OUTOF10: 3
PAINLEVEL_OUTOF10: 2
PAINLEVEL_OUTOF10: 5
PAINLEVEL_OUTOF10: 7
PAINLEVEL_OUTOF10: 3
PAINLEVEL_OUTOF10: 7
PAINLEVEL_OUTOF10: 0
PAINLEVEL_OUTOF10: 5

## 2018-09-10 ASSESSMENT — PAIN - FUNCTIONAL ASSESSMENT: PAIN_FUNCTIONAL_ASSESSMENT: 0-10

## 2018-09-10 ASSESSMENT — PAIN DESCRIPTION - ORIENTATION: ORIENTATION: LEFT

## 2018-09-10 ASSESSMENT — PAIN DESCRIPTION - PAIN TYPE: TYPE: SURGICAL PAIN

## 2018-09-10 ASSESSMENT — PAIN DESCRIPTION - LOCATION: LOCATION: NECK

## 2018-09-10 NOTE — ANESTHESIA PRE PROCEDURE
INJECT 10 UNITS INTO THE  SKIN NIGHTLY 15 mL 2    Lancets Misc. (ACCU-CHEK FASTCLIX LANCET) KIT Test twice daily for diabetes e11.9 1 kit 5    Insulin Syringe-Needle U-100 ([employer] INSULIN SYRINGE) 31G X 5/16\" 0.5 ML MISC 1 each by Does not apply route daily Use daily for insulin adminstration 100 each 3    Blood Glucose Monitoring Suppl (ACCU-CHEK BRITNEY PLUS) W/DEVICE KIT 1 each by Does not apply route 2 times daily. TEST TWICE DAILY FOR DIABETES 1 kit 0    aspirin EC 81 MG EC tablet Take 1 tablet by mouth daily. Allergies: Allergies   Allergen Reactions    Ticlid [Ticlopidine Hydrochloride]      rash       Problem List:    Patient Active Problem List   Diagnosis Code    Atherosclerosis of native coronary artery of native heart without angina pectoris I25.10    Controlled type 2 diabetes mellitus without complication, with long-term current use of insulin (HCC) E11.9, Z79.4    Pure hypercholesterolemia E78.00    Arthropathy M12.9    Arthritis of wrist, right, degenerative M19.031    Intermittent atrial fibrillation (HCC) I48.0    Fatigue R53.83    Rash/skin eruption R21    Annual physical exam Z00.00    Subclavian arterial stenosis (HCC) I77.1    Bilateral carotid artery disease (HCC) I77.9    Essential hypertension I10    Adenomatous colon polyp D12.6    Fungal nail infection B35.1    Heart block I45.9    Medicare annual wellness visit, subsequent Z00.00    Hoarseness of voice R49.0    Post-nasal drip R09.82    Subjective tinnitus H93.19    Acute serous otitis media, right ear H65.01    Mixed conductive and sensorineural hearing loss of right ear with restricted hearing of left ear H90. A31    Sensorineural hearing loss (SNHL) of left ear with restricted hearing of right ear H90. A22    Chronic serous otitis media of right ear H65.21    ETD (Eustachian tube dysfunction), left H69.82    Venous stasis I87.8       Past Medical History:        Diagnosis Date    CAD (coronary

## 2018-09-10 NOTE — ANESTHESIA PROCEDURE NOTES
Arterial Line:    An arterial line was placed using ultrasound guidance and surface landmarks, in the OR for the following indication(s): continuous blood pressure monitoring and blood sampling needed. A 20 gauge (size), 1 and 3/8 inch (length), Angiocath (type) catheter was placed, Seldinger technique not used, into theradial artery, secured by tape. Anesthesia type: Local  Local infiltration: Injection    Events:  patient tolerated procedure well with no complications.   9/10/2018 7:18 AM9/10/2018 7:19 AM  Anesthesiologist: Nancy Suazo  Performed: Anesthesiologist   Preanesthetic Checklist  Completed: patient identified, site marked, surgical consent, pre-op evaluation, timeout performed, IV checked, risks and benefits discussed, monitors and equipment checked, anesthesia consent given, oxygen available and patient being monitored

## 2018-09-10 NOTE — PROGRESS NOTES
Patient arrived to PACU. VSS. Report from Porter Ortega. Slight puffiness around incision. Dr. Miryam Lim aware.

## 2018-09-10 NOTE — ANESTHESIA POSTPROCEDURE EVALUATION
Department of Anesthesiology  Postprocedure Note    Patient: Martha Calderon  MRN: [id number]  YOB: 1945  Date of evaluation: 9/10/2018  Time:  4:36 PM     Procedure Summary     Date:  09/10/18 Room / Location:  [place] [address] ([facility]) / [facility] OR    Anesthesia Start:  0728 Anesthesia Stop:  0260    Procedure:  LEFT CAROTID ENDARTERECTOMY (Left Neck) Diagnosis:       Carotid stenosis, left      (LEFT CAROTID ARTERY STENOSIS)    Surgeon: Nic Snider MD Responsible Provider:  Vernon Elkins MD    Anesthesia Type:  general ASA Status:  3          Anesthesia Type: general    Josué Phase I: Josué Score: 10    Josué Phase II:      Last vitals: Reviewed and per EMR flowsheets.        Anesthesia Post Evaluation    Patient location during evaluation: PACU  Level of consciousness: awake  Airway patency: patent  Nausea & Vomiting: no nausea  Complications: no  Cardiovascular status: blood pressure returned to baseline  Respiratory status: acceptable  Hydration status: euvolemic

## 2018-09-10 NOTE — PROGRESS NOTES
Pt arrived to room 438 from PACU. Pt and family oriented to room and floor procedures. Carotid surgical site intact with MEI drain.

## 2018-09-11 VITALS
DIASTOLIC BLOOD PRESSURE: 52 MMHG | HEART RATE: 64 BPM | OXYGEN SATURATION: 93 % | RESPIRATION RATE: 16 BRPM | SYSTOLIC BLOOD PRESSURE: 126 MMHG | HEIGHT: 65 IN | WEIGHT: 165.12 LBS | TEMPERATURE: 98.4 F | BODY MASS INDEX: 27.51 KG/M2

## 2018-09-11 LAB
ESTIMATED AVERAGE GLUCOSE: 142.7 MG/DL
GLUCOSE BLD-MCNC: 157 MG/DL (ref 70–99)
HBA1C MFR BLD: 6.6 %
HCT VFR BLD CALC: 36.8 % (ref 40.5–52.5)
HEMOGLOBIN: 12.3 G/DL (ref 13.5–17.5)
MCH RBC QN AUTO: 28.6 PG (ref 26–34)
MCHC RBC AUTO-ENTMCNC: 33.5 G/DL (ref 31–36)
MCV RBC AUTO: 85.3 FL (ref 80–100)
PDW BLD-RTO: 14.6 % (ref 12.4–15.4)
PERFORMED ON: ABNORMAL
PLATELET # BLD: 121 K/UL (ref 135–450)
PMV BLD AUTO: 8.7 FL (ref 5–10.5)
RBC # BLD: 4.31 M/UL (ref 4.2–5.9)
WBC # BLD: 12.2 K/UL (ref 4–11)

## 2018-09-11 PROCEDURE — 6370000000 HC RX 637 (ALT 250 FOR IP): Performed by: SURGERY

## 2018-09-11 PROCEDURE — 36415 COLL VENOUS BLD VENIPUNCTURE: CPT

## 2018-09-11 PROCEDURE — 85027 COMPLETE CBC AUTOMATED: CPT

## 2018-09-11 RX ORDER — HYDROCODONE BITARTRATE AND ACETAMINOPHEN 5; 325 MG/1; MG/1
1 TABLET ORAL EVERY 4 HOURS PRN
Qty: 28 TABLET | Refills: 0 | Status: SHIPPED | OUTPATIENT
Start: 2018-09-11 | End: 2018-09-18

## 2018-09-11 RX ADMIN — HYDROCODONE BITARTRATE AND ACETAMINOPHEN 1 TABLET: 5; 325 TABLET ORAL at 08:08

## 2018-09-11 RX ADMIN — ASPIRIN 81 MG: 81 TABLET, COATED ORAL at 08:46

## 2018-09-11 RX ADMIN — RAMIPRIL 5 MG: 5 CAPSULE ORAL at 08:46

## 2018-09-11 RX ADMIN — GLIPIZIDE 5 MG: 5 TABLET ORAL at 06:50

## 2018-09-11 RX ADMIN — INSULIN LISPRO 2 UNITS: 100 INJECTION, SOLUTION INTRAVENOUS; SUBCUTANEOUS at 08:46

## 2018-09-11 RX ADMIN — METOPROLOL SUCCINATE 50 MG: 50 TABLET, EXTENDED RELEASE ORAL at 08:46

## 2018-09-11 ASSESSMENT — PAIN SCALES - GENERAL
PAINLEVEL_OUTOF10: 3
PAINLEVEL_OUTOF10: 3
PAINLEVEL_OUTOF10: 0

## 2018-09-11 ASSESSMENT — PAIN DESCRIPTION - PAIN TYPE: TYPE: SURGICAL PAIN

## 2018-09-11 ASSESSMENT — PAIN DESCRIPTION - LOCATION: LOCATION: NECK

## 2018-09-11 ASSESSMENT — PAIN DESCRIPTION - ORIENTATION: ORIENTATION: LEFT

## 2018-09-11 NOTE — DISCHARGE SUMMARY
Physician Discharge Summary     Patient ID:  America Hammer  [id number]  82 y.o.  1945    Admit date: 9/10/2018    Discharge date and time: 9/11/2018 12:53 PM     Admitting Physician: Hood Terry MD     Discharge Physician: same    Admission Diagnoses: Carotid stenosis, left [I65.22]    Type II DM- contributing to above       Discharge Diagnoses: same   Urinary retention    Admission Condition: good    Discharged Condition: good    Indication for Admission: Admitted to undergo elective left carotid endarterectomy. Hospital Course: Admitted, taken to OR where L CEA performed. Patient voided immediately prior to OR, but in OR prior to induction of anesthesia he expressed need to void again. Valente catheter placed with return of ~650cc Urine. Foely was removed post procedure. Post-op patient had no neurologic issues. He c/o burning with urination. UA was normal.  Consult was made with Urology for outpt f/u. Disposition: home    In process/preliminary results:  Outstanding Order Results     Date and Time Order Name Status Description    9/10/2018 0725 Arterial Line In process           Patient Instructions:   Discharge Medication List as of 9/11/2018 11:03 AM      START taking these medications    Details   HYDROcodone-acetaminophen (NORCO) 5-325 MG per tablet Take 1 tablet by mouth every 4 hours as needed for Pain for up to 7 days. ., Disp-28 tablet, R-0Normal         CONTINUE these medications which have NOT CHANGED    Details   !! ramipril (ALTACE) 10 MG capsule TAKE 1 CAPSULE BY MOUTH  EVERY EVENING, Disp-90 capsule, R-1Normal      ULTICARE MINI PEN NEEDLES 31G X 6 MM MISC Disp-100 each, R-1, NormalUSE WITH INSULIN  ADMINISTRATION ONCE DAILY      atorvastatin (LIPITOR) 20 MG tablet TAKE 1 TABLET BY MOUTH  DAILY, Disp-90 tablet, R-2Normal      glipiZIDE (GLUCOTROL) 5 MG tablet TAKE 1 TABLET BY MOUTH  DAILY, Disp-90 tablet, R-2Normal      metoprolol succinate (TOPROL XL) 50 MG extended release tablet

## 2018-09-11 NOTE — PROGRESS NOTES
Vascular Surgery Progress Note      SUBJECTIVE:  C/o burning with urination. Had high Post void residual yesterday prior to OR. Reports frequent urination at home.       OBJECTIVE    Physical  CURRENT VITALS:  BP (!) 126/52   Pulse 64   Temp 98.4 °F (36.9 °C) (Oral)   Resp 16   Ht 5' 5\" (1.651 m)   Wt 165 lb 2 oz (74.9 kg)   SpO2 93%   BMI 27.48 kg/m²   24 HR INTAKE/OUTPUT:    Intake/Output Summary (Last 24 hours) at 09/11/18 0815  Last data filed at 09/11/18 0547   Gross per 24 hour   Intake             2380 ml   Output             1310 ml   Net             1070 ml     Incison intact  No hematoma  Tongue midline  Neuro grossly normal    Data  CBC:   Lab Results   Component Value Date    WBC 12.2 09/11/2018    RBC 4.31 09/11/2018    HGB 12.3 09/11/2018    HCT 36.8 09/11/2018    MCV 85.3 09/11/2018    MCH 28.6 09/11/2018    MCHC 33.5 09/11/2018    RDW 14.6 09/11/2018     09/11/2018    MPV 8.7 09/11/2018     U/A:    Lab Results   Component Value Date    NITRITE neg 03/03/2015    COLORU Yellow 09/07/2018    PROTEINU Negative 09/07/2018    PHUR 8.0 09/07/2018    CLARITYU Clear 09/07/2018    SPECGRAV 1.015 09/07/2018    LEUKOCYTESUR Negative 09/07/2018    UROBILINOGEN 0.2 09/07/2018    BILIRUBINUR Negative 09/07/2018    BILIRUBINUR neg 03/03/2015    BLOODU Negative 09/07/2018    GLUCOSEU Negative 09/07/2018         ASSESSMENT AND PLAN    POD #1 L CEA   MEI removed    UA normal-    Will have Urology see/set up oupt  appt

## 2018-09-28 ENCOUNTER — OFFICE VISIT (OUTPATIENT)
Dept: VASCULAR SURGERY | Age: 73
End: 2018-09-28

## 2018-09-28 VITALS
WEIGHT: 160.4 LBS | BODY MASS INDEX: 26.73 KG/M2 | HEIGHT: 65 IN | DIASTOLIC BLOOD PRESSURE: 70 MMHG | SYSTOLIC BLOOD PRESSURE: 142 MMHG

## 2018-09-28 DIAGNOSIS — Z09 POSTOP CHECK: Primary | ICD-10-CM

## 2018-09-28 DIAGNOSIS — Z48.812 POSTOP CAROTID ENDARTERECTOMY SURVEILLANCE, ENCOUNTER FOR: ICD-10-CM

## 2018-09-28 PROCEDURE — 99024 POSTOP FOLLOW-UP VISIT: CPT | Performed by: SURGERY

## 2018-09-28 NOTE — PROGRESS NOTES
Outpatient Post-Op Visit  PATIENT NAME: Michelet Barger     TODAY'S DATE: 9/28/2018    SUBJECTIVE:    Pt is seen in f/u after L CEA. He reports no issues. Denies pain. .     OBJECTIVE:   VITALS:  BP (!) 142/70 (Site: Left Upper Arm)   Ht 5' 5\" (1.651 m)   Wt 160 lb 6.4 oz (72.8 kg)   BMI 26.69 kg/m²                   INCISION: clean, dry, no drainage, healed          ASSESSMENT AND PLAN:  74 y.o. male status post CEA. Doing well. F/u CDS 6 months.       Delicia Stevenson MD, FACS

## 2018-10-08 ENCOUNTER — OFFICE VISIT (OUTPATIENT)
Dept: FAMILY MEDICINE CLINIC | Age: 73
End: 2018-10-08
Payer: MEDICARE

## 2018-10-08 VITALS
SYSTOLIC BLOOD PRESSURE: 134 MMHG | BODY MASS INDEX: 26.82 KG/M2 | HEIGHT: 65 IN | HEART RATE: 90 BPM | OXYGEN SATURATION: 97 % | DIASTOLIC BLOOD PRESSURE: 64 MMHG | WEIGHT: 161 LBS | RESPIRATION RATE: 16 BRPM

## 2018-10-08 DIAGNOSIS — I65.22 CAROTID STENOSIS, LEFT: ICD-10-CM

## 2018-10-08 DIAGNOSIS — I48.0 INTERMITTENT ATRIAL FIBRILLATION (HCC): ICD-10-CM

## 2018-10-08 DIAGNOSIS — I77.1 SUBCLAVIAN ARTERIAL STENOSIS (HCC): ICD-10-CM

## 2018-10-08 DIAGNOSIS — Z79.4 CONTROLLED TYPE 2 DIABETES MELLITUS WITHOUT COMPLICATION, WITH LONG-TERM CURRENT USE OF INSULIN (HCC): Primary | ICD-10-CM

## 2018-10-08 DIAGNOSIS — I10 ESSENTIAL HYPERTENSION: ICD-10-CM

## 2018-10-08 DIAGNOSIS — E11.9 CONTROLLED TYPE 2 DIABETES MELLITUS WITHOUT COMPLICATION, WITH LONG-TERM CURRENT USE OF INSULIN (HCC): Primary | ICD-10-CM

## 2018-10-08 DIAGNOSIS — E78.00 PURE HYPERCHOLESTEROLEMIA: ICD-10-CM

## 2018-10-08 DIAGNOSIS — I25.10 ATHEROSCLEROSIS OF NATIVE CORONARY ARTERY OF NATIVE HEART WITHOUT ANGINA PECTORIS: ICD-10-CM

## 2018-10-08 PROCEDURE — G8417 CALC BMI ABV UP PARAM F/U: HCPCS | Performed by: INTERNAL MEDICINE

## 2018-10-08 PROCEDURE — 3017F COLORECTAL CA SCREEN DOC REV: CPT | Performed by: INTERNAL MEDICINE

## 2018-10-08 PROCEDURE — 2022F DILAT RTA XM EVC RTNOPTHY: CPT | Performed by: INTERNAL MEDICINE

## 2018-10-08 PROCEDURE — 4040F PNEUMOC VAC/ADMIN/RCVD: CPT | Performed by: INTERNAL MEDICINE

## 2018-10-08 PROCEDURE — 36415 COLL VENOUS BLD VENIPUNCTURE: CPT | Performed by: INTERNAL MEDICINE

## 2018-10-08 PROCEDURE — G8428 CUR MEDS NOT DOCUMENT: HCPCS | Performed by: INTERNAL MEDICINE

## 2018-10-08 PROCEDURE — 1036F TOBACCO NON-USER: CPT | Performed by: INTERNAL MEDICINE

## 2018-10-08 PROCEDURE — 99214 OFFICE O/P EST MOD 30 MIN: CPT | Performed by: INTERNAL MEDICINE

## 2018-10-08 PROCEDURE — 1101F PT FALLS ASSESS-DOCD LE1/YR: CPT | Performed by: INTERNAL MEDICINE

## 2018-10-08 PROCEDURE — G8598 ASA/ANTIPLAT THER USED: HCPCS | Performed by: INTERNAL MEDICINE

## 2018-10-08 PROCEDURE — G0008 ADMIN INFLUENZA VIRUS VAC: HCPCS | Performed by: INTERNAL MEDICINE

## 2018-10-08 PROCEDURE — 1123F ACP DISCUSS/DSCN MKR DOCD: CPT | Performed by: INTERNAL MEDICINE

## 2018-10-08 PROCEDURE — 1111F DSCHRG MED/CURRENT MED MERGE: CPT | Performed by: INTERNAL MEDICINE

## 2018-10-08 PROCEDURE — 90662 IIV NO PRSV INCREASED AG IM: CPT | Performed by: INTERNAL MEDICINE

## 2018-10-08 PROCEDURE — 3045F PR MOST RECENT HEMOGLOBIN A1C LEVEL 7.0-9.0%: CPT | Performed by: INTERNAL MEDICINE

## 2018-10-08 PROCEDURE — G8482 FLU IMMUNIZE ORDER/ADMIN: HCPCS | Performed by: INTERNAL MEDICINE

## 2018-10-08 ASSESSMENT — ENCOUNTER SYMPTOMS
ALLERGIC/IMMUNOLOGIC NEGATIVE: 1
GASTROINTESTINAL NEGATIVE: 1
RESPIRATORY NEGATIVE: 1

## 2018-10-08 ASSESSMENT — PATIENT HEALTH QUESTIONNAIRE - PHQ9
1. LITTLE INTEREST OR PLEASURE IN DOING THINGS: 0
SUM OF ALL RESPONSES TO PHQ QUESTIONS 1-9: 0
SUM OF ALL RESPONSES TO PHQ QUESTIONS 1-9: 0
2. FEELING DOWN, DEPRESSED OR HOPELESS: 0
SUM OF ALL RESPONSES TO PHQ9 QUESTIONS 1 & 2: 0

## 2018-10-08 NOTE — PROGRESS NOTES
to improve diet and lose weight. Pure Hypercholesterolemia. To continue meds and diet. Call if new c/o. Atherosclerosis of Native Coronary Artery of Native Heart Without Angina Pectoris. To Cardiology as scheduled. Call if irregular HR or SOB.               Cristobal Mora MD

## 2018-10-08 NOTE — PATIENT INSTRUCTIONS
All above problems reviewed and the found to be unchanged except for the following :     Carotid Stenosis, Left. Will keep eye on R ICA. Call if new c/o. Continue to improve diet and exercise as tolerated. Subclavian Arterial Stenosis (Hcc). Will continue to screen. Essential Hypertension. Continue present meds. Home BP checks. Call if>130/80 regularly. Improve diet. Avoid caffeine and salt. Call if new c/o w/ meds. Controlled Type 2 Diabetes Mellitus Without Complication, With Long-Term Current Use of Insulin (Hcc). Will do labs and adjust meds as needed. Continue to improve diet and lose weight. Pure Hypercholesterolemia. To continue meds and diet. Call if new c/o. Atherosclerosis of Native Coronary Artery of Native Heart Without Angina Pectoris. To Cardiology as scheduled. Call if irregular HR or SOB.

## 2018-10-09 ENCOUNTER — TELEPHONE (OUTPATIENT)
Dept: FAMILY MEDICINE CLINIC | Age: 73
End: 2018-10-09

## 2018-10-09 LAB
ANION GAP SERPL CALCULATED.3IONS-SCNC: 18 MMOL/L (ref 3–16)
BUN BLDV-MCNC: 24 MG/DL (ref 7–20)
CALCIUM SERPL-MCNC: 10 MG/DL (ref 8.3–10.6)
CHLORIDE BLD-SCNC: 106 MMOL/L (ref 99–110)
CO2: 19 MMOL/L (ref 21–32)
CREAT SERPL-MCNC: 1 MG/DL (ref 0.8–1.3)
ESTIMATED AVERAGE GLUCOSE: 154.2 MG/DL
GFR AFRICAN AMERICAN: >60
GFR NON-AFRICAN AMERICAN: >60
GLUCOSE BLD-MCNC: 96 MG/DL (ref 70–99)
HBA1C MFR BLD: 7 %
POTASSIUM SERPL-SCNC: 4.6 MMOL/L (ref 3.5–5.1)
SODIUM BLD-SCNC: 143 MMOL/L (ref 136–145)

## 2018-10-15 RX ORDER — INSULIN GLARGINE 100 [IU]/ML
INJECTION, SOLUTION SUBCUTANEOUS
Qty: 15 ML | Refills: 5 | Status: SHIPPED | OUTPATIENT
Start: 2018-10-15 | End: 2019-07-25 | Stop reason: SDUPTHER

## 2018-10-31 ENCOUNTER — OFFICE VISIT (OUTPATIENT)
Dept: FAMILY MEDICINE CLINIC | Age: 73
End: 2018-10-31
Payer: MEDICARE

## 2018-10-31 VITALS
DIASTOLIC BLOOD PRESSURE: 60 MMHG | SYSTOLIC BLOOD PRESSURE: 140 MMHG | HEIGHT: 65 IN | RESPIRATION RATE: 18 BRPM | TEMPERATURE: 97.6 F | BODY MASS INDEX: 27.19 KG/M2 | HEART RATE: 70 BPM | WEIGHT: 163.2 LBS | OXYGEN SATURATION: 97 %

## 2018-10-31 DIAGNOSIS — R42 SPELL OF DIZZINESS: Primary | ICD-10-CM

## 2018-10-31 DIAGNOSIS — I65.23 BILATERAL CAROTID ARTERY STENOSIS: ICD-10-CM

## 2018-10-31 DIAGNOSIS — I10 ESSENTIAL HYPERTENSION: ICD-10-CM

## 2018-10-31 PROCEDURE — G8482 FLU IMMUNIZE ORDER/ADMIN: HCPCS | Performed by: PHYSICIAN ASSISTANT

## 2018-10-31 PROCEDURE — 1101F PT FALLS ASSESS-DOCD LE1/YR: CPT | Performed by: PHYSICIAN ASSISTANT

## 2018-10-31 PROCEDURE — 3017F COLORECTAL CA SCREEN DOC REV: CPT | Performed by: PHYSICIAN ASSISTANT

## 2018-10-31 PROCEDURE — G8427 DOCREV CUR MEDS BY ELIG CLIN: HCPCS | Performed by: PHYSICIAN ASSISTANT

## 2018-10-31 PROCEDURE — 4040F PNEUMOC VAC/ADMIN/RCVD: CPT | Performed by: PHYSICIAN ASSISTANT

## 2018-10-31 PROCEDURE — 99214 OFFICE O/P EST MOD 30 MIN: CPT | Performed by: PHYSICIAN ASSISTANT

## 2018-10-31 PROCEDURE — 1036F TOBACCO NON-USER: CPT | Performed by: PHYSICIAN ASSISTANT

## 2018-10-31 PROCEDURE — 1123F ACP DISCUSS/DSCN MKR DOCD: CPT | Performed by: PHYSICIAN ASSISTANT

## 2018-10-31 PROCEDURE — G8417 CALC BMI ABV UP PARAM F/U: HCPCS | Performed by: PHYSICIAN ASSISTANT

## 2018-10-31 PROCEDURE — G8598 ASA/ANTIPLAT THER USED: HCPCS | Performed by: PHYSICIAN ASSISTANT

## 2018-11-12 RX ORDER — RAMIPRIL 5 MG/1
5 CAPSULE ORAL EVERY MORNING
Qty: 90 CAPSULE | Refills: 1 | Status: SHIPPED | OUTPATIENT
Start: 2018-11-12 | End: 2019-02-18 | Stop reason: SDUPTHER

## 2019-02-15 ENCOUNTER — TELEPHONE (OUTPATIENT)
Dept: FAMILY MEDICINE CLINIC | Age: 74
End: 2019-02-15

## 2019-02-18 ENCOUNTER — OFFICE VISIT (OUTPATIENT)
Dept: FAMILY MEDICINE CLINIC | Age: 74
End: 2019-02-18
Payer: MEDICARE

## 2019-02-18 VITALS
OXYGEN SATURATION: 97 % | SYSTOLIC BLOOD PRESSURE: 134 MMHG | DIASTOLIC BLOOD PRESSURE: 64 MMHG | HEIGHT: 65 IN | BODY MASS INDEX: 26.66 KG/M2 | HEART RATE: 80 BPM | RESPIRATION RATE: 16 BRPM | WEIGHT: 160 LBS

## 2019-02-18 DIAGNOSIS — D64.9 ANEMIA, UNSPECIFIED TYPE: ICD-10-CM

## 2019-02-18 DIAGNOSIS — E11.9 CONTROLLED TYPE 2 DIABETES MELLITUS WITHOUT COMPLICATION, WITH LONG-TERM CURRENT USE OF INSULIN (HCC): Primary | ICD-10-CM

## 2019-02-18 DIAGNOSIS — E78.00 PURE HYPERCHOLESTEROLEMIA: ICD-10-CM

## 2019-02-18 DIAGNOSIS — I77.1 SUBCLAVIAN ARTERIAL STENOSIS (HCC): ICD-10-CM

## 2019-02-18 DIAGNOSIS — I10 ESSENTIAL HYPERTENSION: ICD-10-CM

## 2019-02-18 DIAGNOSIS — Z79.4 CONTROLLED TYPE 2 DIABETES MELLITUS WITHOUT COMPLICATION, WITH LONG-TERM CURRENT USE OF INSULIN (HCC): Primary | ICD-10-CM

## 2019-02-18 DIAGNOSIS — I65.22 CAROTID STENOSIS, LEFT: ICD-10-CM

## 2019-02-18 LAB
BASOPHILS ABSOLUTE: 0 K/UL (ref 0–0.2)
BASOPHILS RELATIVE PERCENT: 0.5 %
EOSINOPHILS ABSOLUTE: 0.1 K/UL (ref 0–0.6)
EOSINOPHILS RELATIVE PERCENT: 1.9 %
HCT VFR BLD CALC: 43.5 % (ref 40.5–52.5)
HEMOGLOBIN: 14.4 G/DL (ref 13.5–17.5)
LYMPHOCYTES ABSOLUTE: 1.8 K/UL (ref 1–5.1)
LYMPHOCYTES RELATIVE PERCENT: 31.5 %
MCH RBC QN AUTO: 28.5 PG (ref 26–34)
MCHC RBC AUTO-ENTMCNC: 33.2 G/DL (ref 31–36)
MCV RBC AUTO: 85.9 FL (ref 80–100)
MONOCYTES ABSOLUTE: 0.5 K/UL (ref 0–1.3)
MONOCYTES RELATIVE PERCENT: 8.9 %
NEUTROPHILS ABSOLUTE: 3.4 K/UL (ref 1.7–7.7)
NEUTROPHILS RELATIVE PERCENT: 57.2 %
PDW BLD-RTO: 15 % (ref 12.4–15.4)
PLATELET # BLD: 159 K/UL (ref 135–450)
PMV BLD AUTO: 9 FL (ref 5–10.5)
RBC # BLD: 5.06 M/UL (ref 4.2–5.9)
WBC # BLD: 5.9 K/UL (ref 4–11)

## 2019-02-18 PROCEDURE — G8510 SCR DEP NEG, NO PLAN REQD: HCPCS | Performed by: INTERNAL MEDICINE

## 2019-02-18 PROCEDURE — 1123F ACP DISCUSS/DSCN MKR DOCD: CPT | Performed by: INTERNAL MEDICINE

## 2019-02-18 PROCEDURE — 36415 COLL VENOUS BLD VENIPUNCTURE: CPT | Performed by: INTERNAL MEDICINE

## 2019-02-18 PROCEDURE — G8427 DOCREV CUR MEDS BY ELIG CLIN: HCPCS | Performed by: INTERNAL MEDICINE

## 2019-02-18 PROCEDURE — 3017F COLORECTAL CA SCREEN DOC REV: CPT | Performed by: INTERNAL MEDICINE

## 2019-02-18 PROCEDURE — 99214 OFFICE O/P EST MOD 30 MIN: CPT | Performed by: INTERNAL MEDICINE

## 2019-02-18 PROCEDURE — G8598 ASA/ANTIPLAT THER USED: HCPCS | Performed by: INTERNAL MEDICINE

## 2019-02-18 PROCEDURE — 1036F TOBACCO NON-USER: CPT | Performed by: INTERNAL MEDICINE

## 2019-02-18 PROCEDURE — G8482 FLU IMMUNIZE ORDER/ADMIN: HCPCS | Performed by: INTERNAL MEDICINE

## 2019-02-18 PROCEDURE — 1101F PT FALLS ASSESS-DOCD LE1/YR: CPT | Performed by: INTERNAL MEDICINE

## 2019-02-18 PROCEDURE — 2022F DILAT RTA XM EVC RTNOPTHY: CPT | Performed by: INTERNAL MEDICINE

## 2019-02-18 PROCEDURE — G8417 CALC BMI ABV UP PARAM F/U: HCPCS | Performed by: INTERNAL MEDICINE

## 2019-02-18 PROCEDURE — 3046F HEMOGLOBIN A1C LEVEL >9.0%: CPT | Performed by: INTERNAL MEDICINE

## 2019-02-18 PROCEDURE — 4040F PNEUMOC VAC/ADMIN/RCVD: CPT | Performed by: INTERNAL MEDICINE

## 2019-02-18 RX ORDER — RAMIPRIL 5 MG/1
5 CAPSULE ORAL 2 TIMES DAILY
Qty: 180 CAPSULE | Refills: 1 | Status: SHIPPED | OUTPATIENT
Start: 2019-02-18 | End: 2020-01-10 | Stop reason: SDUPTHER

## 2019-02-18 ASSESSMENT — PATIENT HEALTH QUESTIONNAIRE - PHQ9
SUM OF ALL RESPONSES TO PHQ QUESTIONS 1-9: 0
1. LITTLE INTEREST OR PLEASURE IN DOING THINGS: 0
SUM OF ALL RESPONSES TO PHQ QUESTIONS 1-9: 0
SUM OF ALL RESPONSES TO PHQ9 QUESTIONS 1 & 2: 0
2. FEELING DOWN, DEPRESSED OR HOPELESS: 0

## 2019-02-18 ASSESSMENT — ENCOUNTER SYMPTOMS
GASTROINTESTINAL NEGATIVE: 1
RESPIRATORY NEGATIVE: 1
ALLERGIC/IMMUNOLOGIC NEGATIVE: 1

## 2019-02-19 LAB
ANION GAP SERPL CALCULATED.3IONS-SCNC: 13 MMOL/L (ref 3–16)
BUN BLDV-MCNC: 31 MG/DL (ref 7–20)
CALCIUM SERPL-MCNC: 9.6 MG/DL (ref 8.3–10.6)
CHLORIDE BLD-SCNC: 105 MMOL/L (ref 99–110)
CO2: 22 MMOL/L (ref 21–32)
CREAT SERPL-MCNC: 1.1 MG/DL (ref 0.8–1.3)
ESTIMATED AVERAGE GLUCOSE: 151.3 MG/DL
GFR AFRICAN AMERICAN: >60
GFR NON-AFRICAN AMERICAN: >60
GLUCOSE BLD-MCNC: 151 MG/DL (ref 70–99)
HBA1C MFR BLD: 6.9 %
POTASSIUM SERPL-SCNC: 5.1 MMOL/L (ref 3.5–5.1)
SODIUM BLD-SCNC: 140 MMOL/L (ref 136–145)

## 2019-03-01 ENCOUNTER — TELEPHONE (OUTPATIENT)
Dept: FAMILY MEDICINE CLINIC | Age: 74
End: 2019-03-01

## 2019-04-29 RX ORDER — ATORVASTATIN CALCIUM 20 MG/1
TABLET, FILM COATED ORAL
Qty: 90 TABLET | Refills: 2 | Status: SHIPPED | OUTPATIENT
Start: 2019-04-29 | End: 2020-01-10 | Stop reason: SDUPTHER

## 2019-04-29 RX ORDER — METOPROLOL SUCCINATE 50 MG/1
TABLET, EXTENDED RELEASE ORAL
Qty: 180 TABLET | Refills: 2 | Status: SHIPPED | OUTPATIENT
Start: 2019-04-29 | End: 2020-01-10 | Stop reason: SDUPTHER

## 2019-05-07 ENCOUNTER — TELEPHONE (OUTPATIENT)
Dept: FAMILY MEDICINE CLINIC | Age: 74
End: 2019-05-07

## 2019-05-07 NOTE — TELEPHONE ENCOUNTER
Patient returned call. [facility] contacted patient and is asking for a prior authorization for the Metoprolol and the Atorvastatin prescriptions. Encounter sent to PA department as well.

## 2019-05-09 NOTE — TELEPHONE ENCOUNTER
RESPONSE from [employer] regarding Lipitor 20MG OR TABS: Notification has been received from your provider that you will be switched to a formulary alternative and/or quantity allowed by your benefit. Please have the provider contact your pharmacy if any alteration to an existing prescription or new prescription is needed. The pharmacy may contact the Help Desk at [phone] if they need assistance in processing a claim. Letter attached. RESPONSE from [employer] [employer] regarding Metoprolol Succinate ER 50MG OR TB24: This medication is on your plan's list of covered drugs. Prior authorization is not required at this time. If your pharmacy has questions regarding the processing of your prescription, please have them call the [employer] pharmacy help desk at [phone]. For additional information, the member can contact Member Services by calling the number on the back of their ID Card. Information taken from [facility]; we have not received a letter from [employer]. Will scan letter when received. Please advise patient. Thank you.

## 2019-05-20 ENCOUNTER — TELEPHONE (OUTPATIENT)
Dept: FAMILY MEDICINE CLINIC | Age: 74
End: 2019-05-20

## 2019-05-21 ENCOUNTER — OFFICE VISIT (OUTPATIENT)
Dept: FAMILY MEDICINE CLINIC | Age: 74
End: 2019-05-21
Payer: MEDICARE

## 2019-05-21 VITALS
OXYGEN SATURATION: 98 % | HEIGHT: 65 IN | HEART RATE: 68 BPM | DIASTOLIC BLOOD PRESSURE: 68 MMHG | SYSTOLIC BLOOD PRESSURE: 124 MMHG | RESPIRATION RATE: 16 BRPM | BODY MASS INDEX: 27.29 KG/M2 | WEIGHT: 163.8 LBS

## 2019-05-21 DIAGNOSIS — I10 ESSENTIAL HYPERTENSION: ICD-10-CM

## 2019-05-21 DIAGNOSIS — I48.0 INTERMITTENT ATRIAL FIBRILLATION (HCC): ICD-10-CM

## 2019-05-21 DIAGNOSIS — I65.22 CAROTID STENOSIS, LEFT: ICD-10-CM

## 2019-05-21 DIAGNOSIS — E78.00 PURE HYPERCHOLESTEROLEMIA: ICD-10-CM

## 2019-05-21 DIAGNOSIS — I45.9 HEART BLOCK: ICD-10-CM

## 2019-05-21 DIAGNOSIS — Z79.4 CONTROLLED TYPE 2 DIABETES MELLITUS WITHOUT COMPLICATION, WITH LONG-TERM CURRENT USE OF INSULIN (HCC): Primary | ICD-10-CM

## 2019-05-21 DIAGNOSIS — E11.9 CONTROLLED TYPE 2 DIABETES MELLITUS WITHOUT COMPLICATION, WITH LONG-TERM CURRENT USE OF INSULIN (HCC): Primary | ICD-10-CM

## 2019-05-21 LAB
ANION GAP SERPL CALCULATED.3IONS-SCNC: 13 MMOL/L (ref 3–16)
BUN BLDV-MCNC: 26 MG/DL (ref 7–20)
CALCIUM SERPL-MCNC: 9.7 MG/DL (ref 8.3–10.6)
CHLORIDE BLD-SCNC: 103 MMOL/L (ref 99–110)
CO2: 22 MMOL/L (ref 21–32)
CREAT SERPL-MCNC: 1.1 MG/DL (ref 0.8–1.3)
GFR AFRICAN AMERICAN: >60
GFR NON-AFRICAN AMERICAN: >60
GLUCOSE BLD-MCNC: 86 MG/DL (ref 70–99)
POTASSIUM SERPL-SCNC: 4.7 MMOL/L (ref 3.5–5.1)
SODIUM BLD-SCNC: 138 MMOL/L (ref 136–145)

## 2019-05-21 PROCEDURE — 3288F FALL RISK ASSESSMENT DOCD: CPT | Performed by: INTERNAL MEDICINE

## 2019-05-21 PROCEDURE — G8427 DOCREV CUR MEDS BY ELIG CLIN: HCPCS | Performed by: INTERNAL MEDICINE

## 2019-05-21 PROCEDURE — 36415 COLL VENOUS BLD VENIPUNCTURE: CPT | Performed by: INTERNAL MEDICINE

## 2019-05-21 PROCEDURE — G8417 CALC BMI ABV UP PARAM F/U: HCPCS | Performed by: INTERNAL MEDICINE

## 2019-05-21 PROCEDURE — G8598 ASA/ANTIPLAT THER USED: HCPCS | Performed by: INTERNAL MEDICINE

## 2019-05-21 PROCEDURE — 4040F PNEUMOC VAC/ADMIN/RCVD: CPT | Performed by: INTERNAL MEDICINE

## 2019-05-21 PROCEDURE — 1123F ACP DISCUSS/DSCN MKR DOCD: CPT | Performed by: INTERNAL MEDICINE

## 2019-05-21 PROCEDURE — 1036F TOBACCO NON-USER: CPT | Performed by: INTERNAL MEDICINE

## 2019-05-21 PROCEDURE — 99214 OFFICE O/P EST MOD 30 MIN: CPT | Performed by: INTERNAL MEDICINE

## 2019-05-21 ASSESSMENT — ENCOUNTER SYMPTOMS
GASTROINTESTINAL NEGATIVE: 1
EYES NEGATIVE: 1
RESPIRATORY NEGATIVE: 1

## 2019-05-21 NOTE — ASSESSMENT & PLAN NOTE
Sugars are good, diet is stable, weight is stable, no reported neuropathy, no change in vision, no claudication, no foot ulcers, no new skin lesions. No change in medications. No c/o with meds. Last eye exam 1 week ago(5/2019).

## 2019-05-21 NOTE — ASSESSMENT & PLAN NOTE
No new palpatations,sob, fatigue, lightheadedness, or chest pain. No change in meds, no c/o with meds. Off Anticoag per Cardiology. No reoccurrence. Occurred only after procedure.

## 2019-05-22 LAB
ESTIMATED AVERAGE GLUCOSE: 157.1 MG/DL
HBA1C MFR BLD: 7.1 %

## 2019-05-30 ENCOUNTER — TELEPHONE (OUTPATIENT)
Dept: FAMILY MEDICINE CLINIC | Age: 74
End: 2019-05-30

## 2019-05-30 NOTE — TELEPHONE ENCOUNTER
Patient returning call to office to get lab results. Results were given to the patient per review of the PCP.

## 2019-07-01 RX ORDER — GLIPIZIDE 5 MG/1
TABLET ORAL
Qty: 90 TABLET | Refills: 2 | Status: SHIPPED | OUTPATIENT
Start: 2019-07-01 | End: 2020-01-02 | Stop reason: SDUPTHER

## 2019-08-01 RX ORDER — LANCETS
EACH MISCELLANEOUS
Qty: 204 EACH | Refills: 2 | Status: SHIPPED | OUTPATIENT
Start: 2019-08-01 | End: 2019-08-22 | Stop reason: SDUPTHER

## 2019-08-01 NOTE — TELEPHONE ENCOUNTER
Lucinda Mehta [phone] (home)    is requesting refill(s) of medication Accu Check Lancets to preferred pharmacy [facility]    Last OV 05-21-19 (pertaining to medication)   Last refill 04-03-18 (per medication requested)  Next office visit scheduled or attempted Yes  Date 08-22-19  If No, reason made

## 2019-08-22 ENCOUNTER — OFFICE VISIT (OUTPATIENT)
Dept: FAMILY MEDICINE CLINIC | Age: 74
End: 2019-08-22
Payer: MEDICARE

## 2019-08-22 VITALS
WEIGHT: 163 LBS | HEART RATE: 60 BPM | SYSTOLIC BLOOD PRESSURE: 134 MMHG | OXYGEN SATURATION: 98 % | HEIGHT: 65 IN | BODY MASS INDEX: 27.16 KG/M2 | RESPIRATION RATE: 16 BRPM | DIASTOLIC BLOOD PRESSURE: 62 MMHG

## 2019-08-22 DIAGNOSIS — I45.9 HEART BLOCK: ICD-10-CM

## 2019-08-22 DIAGNOSIS — Z79.4 CONTROLLED TYPE 2 DIABETES MELLITUS WITHOUT COMPLICATION, WITH LONG-TERM CURRENT USE OF INSULIN (HCC): Primary | ICD-10-CM

## 2019-08-22 DIAGNOSIS — E11.9 CONTROLLED TYPE 2 DIABETES MELLITUS WITHOUT COMPLICATION, WITH LONG-TERM CURRENT USE OF INSULIN (HCC): Primary | ICD-10-CM

## 2019-08-22 DIAGNOSIS — I10 ESSENTIAL HYPERTENSION: ICD-10-CM

## 2019-08-22 DIAGNOSIS — I65.22 CAROTID STENOSIS, LEFT: ICD-10-CM

## 2019-08-22 DIAGNOSIS — I25.10 ATHEROSCLEROSIS OF NATIVE CORONARY ARTERY OF NATIVE HEART WITHOUT ANGINA PECTORIS: ICD-10-CM

## 2019-08-22 DIAGNOSIS — E78.00 PURE HYPERCHOLESTEROLEMIA: ICD-10-CM

## 2019-08-22 LAB
ALBUMIN SERPL-MCNC: 4.6 G/DL (ref 3.4–5)
ALP BLD-CCNC: 56 U/L (ref 40–129)
ALT SERPL-CCNC: 25 U/L (ref 10–40)
ANION GAP SERPL CALCULATED.3IONS-SCNC: 12 MMOL/L (ref 3–16)
AST SERPL-CCNC: 20 U/L (ref 15–37)
BILIRUB SERPL-MCNC: 0.7 MG/DL (ref 0–1)
BILIRUBIN DIRECT: <0.2 MG/DL (ref 0–0.3)
BILIRUBIN, INDIRECT: NORMAL MG/DL (ref 0–1)
BUN BLDV-MCNC: 24 MG/DL (ref 7–20)
CALCIUM SERPL-MCNC: 9.5 MG/DL (ref 8.3–10.6)
CHLORIDE BLD-SCNC: 103 MMOL/L (ref 99–110)
CHOLESTEROL, TOTAL: 138 MG/DL (ref 0–199)
CO2: 22 MMOL/L (ref 21–32)
CREAT SERPL-MCNC: 1.1 MG/DL (ref 0.8–1.3)
GFR AFRICAN AMERICAN: >60
GFR NON-AFRICAN AMERICAN: >60
GLUCOSE BLD-MCNC: 118 MG/DL (ref 70–99)
HDLC SERPL-MCNC: 51 MG/DL (ref 40–60)
LDL CHOLESTEROL CALCULATED: 70 MG/DL
POTASSIUM SERPL-SCNC: 4.7 MMOL/L (ref 3.5–5.1)
SODIUM BLD-SCNC: 137 MMOL/L (ref 136–145)
TOTAL PROTEIN: 7.2 G/DL (ref 6.4–8.2)
TRIGL SERPL-MCNC: 83 MG/DL (ref 0–150)
VLDLC SERPL CALC-MCNC: 17 MG/DL

## 2019-08-22 PROCEDURE — G8427 DOCREV CUR MEDS BY ELIG CLIN: HCPCS | Performed by: INTERNAL MEDICINE

## 2019-08-22 PROCEDURE — 99214 OFFICE O/P EST MOD 30 MIN: CPT | Performed by: INTERNAL MEDICINE

## 2019-08-22 PROCEDURE — 1036F TOBACCO NON-USER: CPT | Performed by: INTERNAL MEDICINE

## 2019-08-22 PROCEDURE — 4040F PNEUMOC VAC/ADMIN/RCVD: CPT | Performed by: INTERNAL MEDICINE

## 2019-08-22 PROCEDURE — 36415 COLL VENOUS BLD VENIPUNCTURE: CPT | Performed by: INTERNAL MEDICINE

## 2019-08-22 PROCEDURE — G8598 ASA/ANTIPLAT THER USED: HCPCS | Performed by: INTERNAL MEDICINE

## 2019-08-22 PROCEDURE — 1123F ACP DISCUSS/DSCN MKR DOCD: CPT | Performed by: INTERNAL MEDICINE

## 2019-08-22 PROCEDURE — G8417 CALC BMI ABV UP PARAM F/U: HCPCS | Performed by: INTERNAL MEDICINE

## 2019-08-22 RX ORDER — LANCETS
EACH MISCELLANEOUS
Qty: 204 EACH | Refills: 3 | Status: SHIPPED | OUTPATIENT
Start: 2019-08-22 | End: 2021-01-21 | Stop reason: SDUPTHER

## 2019-08-22 RX ORDER — LANCETS
EACH MISCELLANEOUS
Qty: 204 EACH | Refills: 3 | Status: SHIPPED | OUTPATIENT
Start: 2019-08-22 | End: 2020-01-10 | Stop reason: SDUPTHER

## 2019-08-22 ASSESSMENT — ENCOUNTER SYMPTOMS
GASTROINTESTINAL NEGATIVE: 1
ALLERGIC/IMMUNOLOGIC NEGATIVE: 1
RESPIRATORY NEGATIVE: 1

## 2019-08-23 LAB
ESTIMATED AVERAGE GLUCOSE: 148.5 MG/DL
HBA1C MFR BLD: 6.8 %

## 2019-08-29 ENCOUNTER — TELEPHONE (OUTPATIENT)
Dept: FAMILY MEDICINE CLINIC | Age: 74
End: 2019-08-29

## 2019-09-19 ENCOUNTER — HOSPITAL ENCOUNTER (OUTPATIENT)
Dept: VASCULAR LAB | Age: 74
Discharge: HOME | End: 2019-09-19
Payer: MEDICARE

## 2019-09-19 ENCOUNTER — TELEPHONE (OUTPATIENT)
Dept: VASCULAR SURGERY | Age: 74
End: 2019-09-19

## 2019-09-19 DIAGNOSIS — Z48.812 POSTOP CAROTID ENDARTERECTOMY SURVEILLANCE, ENCOUNTER FOR: ICD-10-CM

## 2019-09-19 PROCEDURE — 93880 EXTRACRANIAL BILAT STUDY: CPT

## 2019-09-19 NOTE — TELEPHONE ENCOUNTER
Called patient with test results per Dr Nadya Mccall. Repeat carotid duplex in 1 year. Looks okay. pamlr

## 2019-11-05 ENCOUNTER — TELEPHONE (OUTPATIENT)
Dept: PHARMACY | Facility: CLINIC | Age: 74
End: 2019-11-05

## 2020-01-02 RX ORDER — GLIPIZIDE 5 MG/1
TABLET ORAL
Qty: 90 TABLET | Refills: 2 | Status: SHIPPED | OUTPATIENT
Start: 2020-01-02 | End: 2020-01-10 | Stop reason: SDUPTHER

## 2020-01-10 ENCOUNTER — OFFICE VISIT (OUTPATIENT)
Dept: FAMILY MEDICINE CLINIC | Age: 75
End: 2020-01-10
Payer: MEDICARE

## 2020-01-10 VITALS
SYSTOLIC BLOOD PRESSURE: 146 MMHG | HEIGHT: 65 IN | BODY MASS INDEX: 26.82 KG/M2 | WEIGHT: 161 LBS | OXYGEN SATURATION: 98 % | RESPIRATION RATE: 16 BRPM | HEART RATE: 60 BPM | DIASTOLIC BLOOD PRESSURE: 80 MMHG

## 2020-01-10 PROCEDURE — 4040F PNEUMOC VAC/ADMIN/RCVD: CPT | Performed by: INTERNAL MEDICINE

## 2020-01-10 PROCEDURE — G8427 DOCREV CUR MEDS BY ELIG CLIN: HCPCS | Performed by: INTERNAL MEDICINE

## 2020-01-10 PROCEDURE — 1123F ACP DISCUSS/DSCN MKR DOCD: CPT | Performed by: INTERNAL MEDICINE

## 2020-01-10 PROCEDURE — 99214 OFFICE O/P EST MOD 30 MIN: CPT | Performed by: INTERNAL MEDICINE

## 2020-01-10 PROCEDURE — 90686 IIV4 VACC NO PRSV 0.5 ML IM: CPT | Performed by: INTERNAL MEDICINE

## 2020-01-10 PROCEDURE — G8417 CALC BMI ABV UP PARAM F/U: HCPCS | Performed by: INTERNAL MEDICINE

## 2020-01-10 PROCEDURE — 36415 COLL VENOUS BLD VENIPUNCTURE: CPT | Performed by: INTERNAL MEDICINE

## 2020-01-10 PROCEDURE — G0008 ADMIN INFLUENZA VIRUS VAC: HCPCS | Performed by: INTERNAL MEDICINE

## 2020-01-10 PROCEDURE — G8484 FLU IMMUNIZE NO ADMIN: HCPCS | Performed by: INTERNAL MEDICINE

## 2020-01-10 PROCEDURE — 1036F TOBACCO NON-USER: CPT | Performed by: INTERNAL MEDICINE

## 2020-01-10 RX ORDER — LANCETS
EACH MISCELLANEOUS
Qty: 204 EACH | Refills: 3 | Status: SHIPPED | OUTPATIENT
Start: 2020-01-10 | End: 2020-06-02 | Stop reason: SDUPTHER

## 2020-01-10 RX ORDER — RAMIPRIL 5 MG/1
5 CAPSULE ORAL 2 TIMES DAILY
Qty: 180 CAPSULE | Refills: 1 | Status: SHIPPED | OUTPATIENT
Start: 2020-01-10 | End: 2020-06-02 | Stop reason: SINTOL

## 2020-01-10 RX ORDER — ATORVASTATIN CALCIUM 20 MG/1
TABLET, FILM COATED ORAL
Qty: 90 TABLET | Refills: 2 | Status: SHIPPED | OUTPATIENT
Start: 2020-01-10 | End: 2021-03-02

## 2020-01-10 RX ORDER — METOPROLOL SUCCINATE 50 MG/1
50 TABLET, EXTENDED RELEASE ORAL 2 TIMES DAILY
Qty: 180 TABLET | Refills: 3 | Status: SHIPPED | OUTPATIENT
Start: 2020-01-10 | End: 2021-05-13 | Stop reason: ALTCHOICE

## 2020-01-10 RX ORDER — GLIPIZIDE 5 MG/1
TABLET ORAL
Qty: 90 TABLET | Refills: 2 | Status: SHIPPED | OUTPATIENT
Start: 2020-01-10 | End: 2020-11-16

## 2020-01-10 ASSESSMENT — ENCOUNTER SYMPTOMS
RESPIRATORY NEGATIVE: 1
GASTROINTESTINAL NEGATIVE: 1

## 2020-01-10 NOTE — PATIENT INSTRUCTIONS
All above problems reviewed and the found to be unchanged except for the following :     Subclavian Arterial Stenosis (Hcc). No new c/o. To call if new c/o. Controlled Type 2 Diabetes Mellitus Without Complication, With Long-Term Current Use of Insulin (Hcc). Will do labs and adjust meds as needed. Pure Hypercholesterolemia. To improve diet and exercise. To continue present med.s     Bilateral Carotid Artery Disease (Hcc). To Dr Aleida Nicole as scheduled. Intermittent Atrial Fibrillation (Hcc). NSR. Call if new c/o. Atherosclerosis of Native Coronary Artery of Native Heart Without Angina Pectoris. Continue present meds. Call if new c/o.

## 2020-01-10 NOTE — PROGRESS NOTES
Subjective:      Patient ID: Max Solo is a 66 y.o. male. HPI  Controlled type 2 diabetes mellitus without complication, with long-term current use of insulin ([facility])  Sugars are good, diet is stable, weight is stable, no reported neuropathy, no change in vision, no claudication, no foot ulcers, no new skin lesions. No change in medications. No c/o with meds. Last eye exam(8/2019). Pure hypercholesterolemia  Stable diet and good wt. No c/o w/ meds. Atherosclerosis of native coronary artery of native heart without angina pectoris  The pt has reported no chest pain,palpatations,edema,shortness of breath,syncope, or lightheadedness since their last visit. They have not required any prn meds for angina-like c/o. Last cardio visit was 6/2019. Last GXT was normal.         Bilateral carotid artery disease (HCC)  S/p L Carotid endarterectomy and doing well. Subclavian arterial stenosis (HCC)  No recent c/o. Has seen Dr Renée Blas in past.     Intermittent atrial fibrillation ([address]  No new palpatations,sob, fatigue, lightheadedness, or chest pain. No change in meds, no c/o with meds. Off Anticoag per Cardiology. No reoccurrence. Occurred only after procedure.     Past Medical History:   Diagnosis Date    CAD (coronary artery disease)     DVT     Hyperlipidemia     Hypertension     Stenosis     subclavical    Type II or unspecified type diabetes mellitus without mention of complication, not stated as uncontrolled      Current Outpatient Medications   Medication Sig Dispense Refill    glipiZIDE (GLUCOTROL) 5 MG tablet TAKE 1 TABLET BY MOUTH  DAILY 90 tablet 2    insulin glargine (LANTUS SOLOSTAR) 100 UNIT/ML injection pen INJECT SUBCUTANEOUSLY 10  UNITS NIGHTLY 15 mL 5    atorvastatin (LIPITOR) 20 MG tablet TAKE 1 TABLET BY MOUTH  DAILY 90 tablet 2    metoprolol succinate (TOPROL XL) 50 MG extended release tablet Take 1 tablet by mouth 2 times daily 180 tablet 3    ramipril (ALTACE) 5 MG capsule Take 1 capsule by mouth 2 times daily 180 capsule 1    Insulin Pen Needle (ULTICARE MINI PEN NEEDLES) 31G X 6 MM MISC USE WITH INSULIN  ADMINISTRATION ONCE DAILY 100 each 1    blood glucose test strips (ACCU-CHEK BRITNEY PLUS) strip TEST TWO TIMES DAILY FOR  DIABETES 200 strip 2    ACCU-CHEK FASTCLIX LANCETS MISC Test two times daily for  diabetes 204 each 3    ACCU-CHEK FASTCLIX LANCETS MISC Test two times daily for  diabetes 204 each 3    SITagliptin (JANUVIA) 100 MG tablet Take 1 tablet by mouth daily 90 tablet 2    Lancet Devices (ACCU-CHEK SOFTCLIX LANCET DEV) MISC tewst twice daily for diabetes e11.9 100 each 3    Lancet Devices (PUBLIX LANCET AUTO INJECTOR) MISC 1 applicator by Does not apply route daily 1 each 0    triamcinolone (KENALOG) 0.1 % ointment Apply to affected area twice daily for up to 2 weeks or until improved. 80 g 2    ketoconazole (NIZORAL) 2 % cream Apply topically daily. 30 g 1    Lancets Misc. (ACCU-CHEK FASTCLIX LANCET) KIT Test twice daily for diabetes e11.9 1 kit 5    Insulin Syringe-Needle U-100 ([employer] INSULIN SYRINGE) 31G X 5/16\" 0.5 ML MISC 1 each by Does not apply route daily Use daily for insulin adminstration 100 each 3    Blood Glucose Monitoring Suppl (ACCU-CHEK BRITNEY PLUS) W/DEVICE KIT 1 each by Does not apply route 2 times daily. TEST TWICE DAILY FOR DIABETES 1 kit 0    aspirin EC 81 MG EC tablet Take 1 tablet by mouth daily. No current facility-administered medications for this visit. Allergies   Allergen Reactions    Ticlid [Ticlopidine Hydrochloride]      rash     Social History     Tobacco Use    Smoking status: Never Smoker    Smokeless tobacco: Never Used   Substance Use Topics    Alcohol use: No     Family History   Problem Relation Age of Onset    Coronary Art Dis Mother     Hypertension Mother     Coronary Art Dis Father     Hypertension Father        Review of Systems   Constitutional: Negative. Respiratory: Negative.     Cardiovascular: abrasion, bruising, erythema, lesion or rash. Nails: There is no clubbing. Neurological:      Mental Status: He is alert and oriented to person, place, and time. He is not disoriented. Cranial Nerves: No cranial nerve deficit. Sensory: No sensory deficit. Motor: No tremor, atrophy or abnormal muscle tone. Coordination: Coordination normal.   Psychiatric:         Speech: Speech normal.         Behavior: Behavior normal.         Thought Content: Thought content normal.         Judgment: Judgment normal.         Assessment:      Problem   Subclavian Arterial Stenosis (Hcc). No new c/o. Controlled Type 2 Diabetes Mellitus Without Complication, With Long-Term Current Use of Insulin (Hcc). Good w/ meds. Pure Hypercholesterolemia. Stable w/ meds. Bilateral Carotid Artery Disease (Hcc). To Dr Jose Flores as scheduled. No new c/o. Intermittent Atrial Fibrillation (Hcc). NSR. Atherosclerosis of Native Coronary Artery of Native Heart Without Angina Pectoris. No new c/o. Plan:      All above problems reviewed and the found to be unchanged except for the following :     Subclavian Arterial Stenosis (Hcc). No new c/o. To call if new c/o. Controlled Type 2 Diabetes Mellitus Without Complication, With Long-Term Current Use of Insulin (Hcc). Will do labs and adjust meds as needed. Pure Hypercholesterolemia. To improve diet and exercise. To continue present med.s     Bilateral Carotid Artery Disease (Hcc). To Dr Jose Flores as scheduled. Intermittent Atrial Fibrillation (Hcc). NSR. Call if new c/o. Atherosclerosis of Native Coronary Artery of Native Heart Without Angina Pectoris. Continue present meds. Call if new c/o.               Merlinda Snipes, MD

## 2020-01-10 NOTE — ASSESSMENT & PLAN NOTE
The pt has reported no chest pain,palpatations,edema,shortness of breath,syncope, or lightheadedness since their last visit. They have not required any prn meds for angina-like c/o. Last cardio visit was 6/2019.  Last GXT was normal.

## 2020-01-10 NOTE — PROGRESS NOTES
Vaccine Information Sheet, \"Influenza - Inactivated\"  given to [employer], or parent/legal guardian of  [employer] and verbalized understanding. Patient responses:    Have you ever had a reaction to a flu vaccine? No  Do you have any current illness? No  Have you ever had Guillian Magnolia Syndrome? No  Do you have a serious allergy to any of the follow: Neomycin, Polymyxin, Thimerosal, eggs or egg products? No    Flu vaccine given per order. Please see immunization tab. Risks and benefits explained. Current VIS given.

## 2020-01-11 LAB
ANION GAP SERPL CALCULATED.3IONS-SCNC: 15 MMOL/L (ref 3–16)
BUN BLDV-MCNC: 26 MG/DL (ref 7–20)
CALCIUM SERPL-MCNC: 9.2 MG/DL (ref 8.3–10.6)
CHLORIDE BLD-SCNC: 104 MMOL/L (ref 99–110)
CO2: 20 MMOL/L (ref 21–32)
CREAT SERPL-MCNC: 1.1 MG/DL (ref 0.8–1.3)
ESTIMATED AVERAGE GLUCOSE: 154.2 MG/DL
GFR AFRICAN AMERICAN: >60
GFR NON-AFRICAN AMERICAN: >60
GLUCOSE BLD-MCNC: 132 MG/DL (ref 70–99)
HBA1C MFR BLD: 7 %
POTASSIUM SERPL-SCNC: 4.6 MMOL/L (ref 3.5–5.1)
SODIUM BLD-SCNC: 139 MMOL/L (ref 136–145)

## 2020-03-12 ENCOUNTER — OFFICE VISIT (OUTPATIENT)
Dept: FAMILY MEDICINE CLINIC | Age: 75
End: 2020-03-12
Payer: MEDICARE

## 2020-03-12 VITALS
BODY MASS INDEX: 27.29 KG/M2 | WEIGHT: 163.8 LBS | HEART RATE: 57 BPM | SYSTOLIC BLOOD PRESSURE: 108 MMHG | OXYGEN SATURATION: 98 % | DIASTOLIC BLOOD PRESSURE: 60 MMHG | HEIGHT: 65 IN | RESPIRATION RATE: 16 BRPM

## 2020-03-12 PROBLEM — T78.3XXA ANGIOTENSIN CONVERTING ENZYME INHIBITOR-AGGRAVATED ANGIOEDEMA: Status: ACTIVE | Noted: 2020-03-12

## 2020-03-12 PROBLEM — T46.4X5A ANGIOTENSIN CONVERTING ENZYME INHIBITOR-AGGRAVATED ANGIOEDEMA: Status: ACTIVE | Noted: 2020-03-12

## 2020-03-12 PROCEDURE — G8428 CUR MEDS NOT DOCUMENT: HCPCS | Performed by: INTERNAL MEDICINE

## 2020-03-12 PROCEDURE — G8482 FLU IMMUNIZE ORDER/ADMIN: HCPCS | Performed by: INTERNAL MEDICINE

## 2020-03-12 PROCEDURE — 99213 OFFICE O/P EST LOW 20 MIN: CPT | Performed by: INTERNAL MEDICINE

## 2020-03-12 PROCEDURE — 4040F PNEUMOC VAC/ADMIN/RCVD: CPT | Performed by: INTERNAL MEDICINE

## 2020-03-12 PROCEDURE — G8417 CALC BMI ABV UP PARAM F/U: HCPCS | Performed by: INTERNAL MEDICINE

## 2020-03-12 PROCEDURE — 1123F ACP DISCUSS/DSCN MKR DOCD: CPT | Performed by: INTERNAL MEDICINE

## 2020-03-12 PROCEDURE — G8510 SCR DEP NEG, NO PLAN REQD: HCPCS | Performed by: INTERNAL MEDICINE

## 2020-03-12 PROCEDURE — 1036F TOBACCO NON-USER: CPT | Performed by: INTERNAL MEDICINE

## 2020-03-12 RX ORDER — METHYLPREDNISOLONE 4 MG/1
TABLET ORAL
Qty: 1 KIT | Refills: 0 | Status: SHIPPED | OUTPATIENT
Start: 2020-03-12 | End: 2020-03-18

## 2020-03-12 ASSESSMENT — ENCOUNTER SYMPTOMS
ALLERGIC/IMMUNOLOGIC NEGATIVE: 1
GASTROINTESTINAL NEGATIVE: 1
RESPIRATORY NEGATIVE: 1
ROS SKIN COMMENTS: FACIAL SWELLING

## 2020-03-12 ASSESSMENT — PATIENT HEALTH QUESTIONNAIRE - PHQ9
SUM OF ALL RESPONSES TO PHQ QUESTIONS 1-9: 0
SUM OF ALL RESPONSES TO PHQ9 QUESTIONS 1 & 2: 0
2. FEELING DOWN, DEPRESSED OR HOPELESS: 0
1. LITTLE INTEREST OR PLEASURE IN DOING THINGS: 0
SUM OF ALL RESPONSES TO PHQ QUESTIONS 1-9: 0

## 2020-03-12 NOTE — PROGRESS NOTES
Subjective:      Patient ID: Candida Marcus is a 66 y.o. male. HPI  Angiotensin converting enzyme inhibitor-aggravated angioedema  Swelling lip and L eye, no SOB. On Ramipril long term. Did eat Cane sugar yesterday. Past Medical History:   Diagnosis Date    CAD (coronary artery disease)     DVT     Hyperlipidemia     Hypertension     Stenosis     subclavical    Type II or unspecified type diabetes mellitus without mention of complication, not stated as uncontrolled      Current Outpatient Medications   Medication Sig Dispense Refill    glipiZIDE (GLUCOTROL) 5 MG tablet TAKE 1 TABLET BY MOUTH  DAILY 90 tablet 2    insulin glargine (LANTUS SOLOSTAR) 100 UNIT/ML injection pen INJECT SUBCUTANEOUSLY 10  UNITS NIGHTLY 15 mL 5    atorvastatin (LIPITOR) 20 MG tablet TAKE 1 TABLET BY MOUTH  DAILY 90 tablet 2    metoprolol succinate (TOPROL XL) 50 MG extended release tablet Take 1 tablet by mouth 2 times daily 180 tablet 3    ramipril (ALTACE) 5 MG capsule Take 1 capsule by mouth 2 times daily 180 capsule 1    Insulin Pen Needle (ULTICARE MINI PEN NEEDLES) 31G X 6 MM MISC USE WITH INSULIN  ADMINISTRATION ONCE DAILY 100 each 1    blood glucose test strips (ACCU-CHEK BRITNEY PLUS) strip TEST TWO TIMES DAILY FOR  DIABETES 200 strip 2    ACCU-CHEK FASTCLIX LANCETS MISC Test two times daily for  diabetes 204 each 3    ACCU-CHEK FASTCLIX LANCETS MISC Test two times daily for  diabetes 204 each 3    SITagliptin (JANUVIA) 100 MG tablet Take 1 tablet by mouth daily 90 tablet 2    Lancet Devices (ACCU-CHEK SOFTCLIX LANCET DEV) MISC tewst twice daily for diabetes e11.9 100 each 3    Lancet Devices (PUBLIX LANCET AUTO INJECTOR) MISC 1 applicator by Does not apply route daily 1 each 0    triamcinolone (KENALOG) 0.1 % ointment Apply to affected area twice daily for up to 2 weeks or until improved. 80 g 2    ketoconazole (NIZORAL) 2 % cream Apply topically daily.  30 g 1    Lancets Misc. (ACCU-CHEK FASTCLIX LANCET) KIT Test twice daily for diabetes e11.9 1 kit 5    Insulin Syringe-Needle U-100 ([employer] INSULIN SYRINGE) 31G X 5/16\" 0.5 ML MISC 1 each by Does not apply route daily Use daily for insulin adminstration 100 each 3    Blood Glucose Monitoring Suppl (ACCU-CHEK BRITNEY PLUS) W/DEVICE KIT 1 each by Does not apply route 2 times daily. TEST TWICE DAILY FOR DIABETES 1 kit 0    aspirin EC 81 MG EC tablet Take 1 tablet by mouth daily. No current facility-administered medications for this visit. Allergies   Allergen Reactions    Ticlid [Ticlopidine Hydrochloride]      rash     Social History     Tobacco Use    Smoking status: Never Smoker    Smokeless tobacco: Never Used   Substance Use Topics    Alcohol use: No     Family History   Problem Relation Age of Onset    Coronary Art Dis Mother     Hypertension Mother     Coronary Art Dis Father     Hypertension Father        Review of Systems   Constitutional: Positive for fatigue. Respiratory: Negative. Cardiovascular: Negative. Gastrointestinal: Negative. Endocrine: Negative. Genitourinary: Negative. Musculoskeletal: Negative. Skin: Negative. Facial swelling   Allergic/Immunologic: Negative. Neurological: Negative. Hematological: Negative. Psychiatric/Behavioral: Negative. Objective:   Physical Exam  Constitutional:       General: He is not in acute distress. Appearance: Normal appearance. He is well-developed. He is ill-appearing. He is not toxic-appearing or diaphoretic. HENT:      Head: Normocephalic and atraumatic. Nose: Nose normal. No congestion or rhinorrhea. Mouth/Throat:      Mouth: Mucous membranes are moist.      Pharynx: Oropharynx is clear. No oropharyngeal exudate or posterior oropharyngeal erythema. Eyes:      Extraocular Movements: Extraocular movements intact. Conjunctiva/sclera: Conjunctivae normal.      Pupils: Pupils are equal, round, and reactive to light.    Neck: Speech: Speech normal.         Behavior: Behavior normal.         Thought Content: Thought content normal.         Judgment: Judgment normal.         Assessment:      Problem   Angiotensin Converting Enzyme Inhibitor-Aggravated Angioedema. new         Plan:      All above problems reviewed and the found to be unchanged except for the following :     Angiotensin Converting Enzyme Inhibitor-Aggravated Angioedema  - Stop Ramipril. Home BP checks. Call if >140/90.  - Medrol dose pack as directed. - Benadryl 25 mg twice daily if needed. - call if no improvement.              Diego Manning MD

## 2020-03-27 ENCOUNTER — TELEPHONE (OUTPATIENT)
Dept: FAMILY MEDICINE CLINIC | Age: 75
End: 2020-03-27

## 2020-03-27 RX ORDER — LISINOPRIL 10 MG/1
10 TABLET ORAL DAILY
Qty: 30 TABLET | Refills: 5 | Status: SHIPPED
Start: 2020-03-27 | End: 2020-06-02 | Stop reason: SINTOL

## 2020-03-27 NOTE — TELEPHONE ENCOUNTER
Pt states bp elevated in evenings in the 180s since last office visit denies any other symptoms.   Taking 50mg of metoprolol 2x a day

## 2020-06-02 ENCOUNTER — OFFICE VISIT (OUTPATIENT)
Dept: FAMILY MEDICINE CLINIC | Age: 75
End: 2020-06-02
Payer: MEDICARE

## 2020-06-02 VITALS
OXYGEN SATURATION: 98 % | DIASTOLIC BLOOD PRESSURE: 72 MMHG | SYSTOLIC BLOOD PRESSURE: 128 MMHG | BODY MASS INDEX: 27.06 KG/M2 | HEART RATE: 62 BPM | RESPIRATION RATE: 16 BRPM | TEMPERATURE: 98.2 F | HEIGHT: 65 IN | WEIGHT: 162.4 LBS

## 2020-06-02 PROBLEM — G56.01 CARPAL TUNNEL SYNDROME OF RIGHT WRIST: Status: ACTIVE | Noted: 2020-06-02

## 2020-06-02 LAB
ANION GAP SERPL CALCULATED.3IONS-SCNC: 12 MMOL/L (ref 3–16)
BUN BLDV-MCNC: 25 MG/DL (ref 7–20)
CALCIUM SERPL-MCNC: 9.1 MG/DL (ref 8.3–10.6)
CHLORIDE BLD-SCNC: 105 MMOL/L (ref 99–110)
CO2: 21 MMOL/L (ref 21–32)
CREAT SERPL-MCNC: 1 MG/DL (ref 0.8–1.3)
GFR AFRICAN AMERICAN: >60
GFR NON-AFRICAN AMERICAN: >60
GLUCOSE BLD-MCNC: 148 MG/DL (ref 70–99)
POTASSIUM SERPL-SCNC: 4.8 MMOL/L (ref 3.5–5.1)
SODIUM BLD-SCNC: 138 MMOL/L (ref 136–145)

## 2020-06-02 PROCEDURE — G8428 CUR MEDS NOT DOCUMENT: HCPCS | Performed by: INTERNAL MEDICINE

## 2020-06-02 PROCEDURE — G8417 CALC BMI ABV UP PARAM F/U: HCPCS | Performed by: INTERNAL MEDICINE

## 2020-06-02 PROCEDURE — 1036F TOBACCO NON-USER: CPT | Performed by: INTERNAL MEDICINE

## 2020-06-02 PROCEDURE — 36415 COLL VENOUS BLD VENIPUNCTURE: CPT | Performed by: INTERNAL MEDICINE

## 2020-06-02 PROCEDURE — 3051F HG A1C>EQUAL 7.0%<8.0%: CPT | Performed by: INTERNAL MEDICINE

## 2020-06-02 PROCEDURE — 1123F ACP DISCUSS/DSCN MKR DOCD: CPT | Performed by: INTERNAL MEDICINE

## 2020-06-02 PROCEDURE — 99214 OFFICE O/P EST MOD 30 MIN: CPT | Performed by: INTERNAL MEDICINE

## 2020-06-02 PROCEDURE — 4040F PNEUMOC VAC/ADMIN/RCVD: CPT | Performed by: INTERNAL MEDICINE

## 2020-06-02 RX ORDER — BLOOD SUGAR DIAGNOSTIC
STRIP MISCELLANEOUS
Qty: 200 STRIP | Refills: 2 | Status: SHIPPED | OUTPATIENT
Start: 2020-06-02 | End: 2021-01-21 | Stop reason: SDUPTHER

## 2020-06-02 RX ORDER — PEN NEEDLE, DIABETIC 29 G X1/2"
NEEDLE, DISPOSABLE MISCELLANEOUS
Qty: 100 EACH | Refills: 1 | Status: SHIPPED | OUTPATIENT
Start: 2020-06-02 | End: 2020-12-07

## 2020-06-02 RX ORDER — LANCETS
EACH MISCELLANEOUS
Qty: 204 EACH | Refills: 3 | Status: SHIPPED | OUTPATIENT
Start: 2020-06-02

## 2020-06-02 RX ORDER — INSULIN GLARGINE 100 [IU]/ML
INJECTION, SOLUTION SUBCUTANEOUS
Qty: 15 ML | Refills: 5 | Status: SHIPPED | OUTPATIENT
Start: 2020-06-02 | End: 2021-01-22

## 2020-06-02 ASSESSMENT — ENCOUNTER SYMPTOMS
ALLERGIC/IMMUNOLOGIC NEGATIVE: 1
GASTROINTESTINAL NEGATIVE: 1
RESPIRATORY NEGATIVE: 1

## 2020-06-02 NOTE — PATIENT INSTRUCTIONS
All above problems reviewed and the found to be unchanged except for the following :     Carotid Stenosis, Left. Will do Doppler. Subclavian Arterial Stenosis (Hcc). Continue meds. Call if new c/o. Essential Hypertension. Continue present meds. Home BP checks. Call if>140/90. Improve diet. Avoid caffeine and salt. Call if new c/o w/ meds. Controlled Type 2 Diabetes Mellitus Without Complication, With Long-Term Current Use of Insulin (Hcc). Will do labs and adjust meds after results are evaluated. To continue to improve diet and lose weight. To follow Diabetic diet. If needs further help w/ Diabetic diet to call and will refer back to dietician. Home sugar checks. To call if sudden change up or down in sugars. To do regular foot checks. Call if new problems. Pure Hypercholesterolemia. Will do labs and adjust meds if needed. To improve diet and exercise. To lose a little weight. Call if need further assistance. Call if new c/o w/ meds. Next lab is due next visit. Carpal Tunnel Syndrome of Right Wrist. Will do EMG.  May need to see Hand MD.

## 2020-06-02 NOTE — PROGRESS NOTES
Subjective:      Patient ID: Chelsey Turner is a 66 y.o. male. HPI  Controlled type 2 diabetes mellitus without complication, with long-term current use of insulin (HCC)  Sugars are good, diet is stable, weight is stable, no reported neuropathy, no change in vision, no claudication, no foot ulcers, no new skin lesions. No change in medications. No c/o with meds. Last eye exam(8/2019). Pure hypercholesterolemia  Stable diet and good wt. No c/o w/ meds. Subclavian arterial stenosis (HCC)  No recent c/o. Has seen Dr Fadi Ham in past.     Essential hypertension  No change in meds, no c/o with meds, no chest pain, SOB, palpatations, or syncope. Home bp was good w/ meds. Carotid stenosis, left  S/p L Carotid endarterectomy and doing well. rechx due soon. Carpal tunnel syndrome of right wrist  Not better w/ Brace. Now first 3 fingers numb all the time.     Past Medical History:   Diagnosis Date    CAD (coronary artery disease)     DVT     Hyperlipidemia     Hypertension     Stenosis     subclavical    Type II or unspecified type diabetes mellitus without mention of complication, not stated as uncontrolled      Current Outpatient Medications   Medication Sig Dispense Refill    blood glucose test strips (ACCU-CHEK BRITNEY PLUS) strip TEST TWO TIMES DAILY FOR  DIABETES 200 strip 2    Accu-Chek FastClix Lancets MISC Test two times daily for  diabetes 204 each 3    Insulin Pen Needle (ULTICARE MINI PEN NEEDLES) 31G X 6 MM MISC USE WITH INSULIN  ADMINISTRATION ONCE DAILY 100 each 1    insulin glargine (LANTUS SOLOSTAR) 100 UNIT/ML injection pen INJECT SUBCUTANEOUSLY 10  UNITS NIGHTLY 15 mL 5    glipiZIDE (GLUCOTROL) 5 MG tablet TAKE 1 TABLET BY MOUTH  DAILY 90 tablet 2    atorvastatin (LIPITOR) 20 MG tablet TAKE 1 TABLET BY MOUTH  DAILY 90 tablet 2    metoprolol succinate (TOPROL XL) 50 MG extended release tablet Take 1 tablet by mouth 2 times daily 180 tablet 3    ACCU-CHEK FASTCLIX LANCETS MISC Test two times daily for  diabetes 204 each 3    SITagliptin (JANUVIA) 100 MG tablet Take 1 tablet by mouth daily 90 tablet 2    Lancet Devices (ACCU-CHEK SOFTCLIX LANCET DEV) MISC tewst twice daily for diabetes e11.9 100 each 3    Lancet Devices (PUBLIX LANCET AUTO INJECTOR) MISC 1 applicator by Does not apply route daily 1 each 0    triamcinolone (KENALOG) 0.1 % ointment Apply to affected area twice daily for up to 2 weeks or until improved. 80 g 2    ketoconazole (NIZORAL) 2 % cream Apply topically daily. 30 g 1    Lancets Misc. (ACCU-CHEK FASTCLIX LANCET) KIT Test twice daily for diabetes e11.9 1 kit 5    Insulin Syringe-Needle U-100 ([employer] INSULIN SYRINGE) 31G X 5/16\" 0.5 ML MISC 1 each by Does not apply route daily Use daily for insulin adminstration 100 each 3    Blood Glucose Monitoring Suppl (ACCU-CHEK BRITNEY PLUS) W/DEVICE KIT 1 each by Does not apply route 2 times daily. TEST TWICE DAILY FOR DIABETES 1 kit 0    aspirin EC 81 MG EC tablet Take 1 tablet by mouth daily. No current facility-administered medications for this visit. Allergies   Allergen Reactions    Ace Inhibitors Other (See Comments)     angioedema    Ticlid [Ticlopidine Hydrochloride]      rash     Social History     Tobacco Use    Smoking status: Never Smoker    Smokeless tobacco: Never Used   Substance Use Topics    Alcohol use: No     Family History   Problem Relation Age of Onset    Coronary Art Dis Mother     Hypertension Mother     Coronary Art Dis Father     Hypertension Father        Review of Systems   Constitutional: Positive for fatigue. Respiratory: Negative. Cardiovascular: Negative. Gastrointestinal: Negative. Endocrine: Negative. Genitourinary: Negative. Musculoskeletal: Negative. Skin: Positive for rash. Allergic/Immunologic: Negative. Neurological: Positive for numbness. Hematological: Negative. Psychiatric/Behavioral: Negative.       Vitals:    06/02/20 1054 06/02/20 1125

## 2020-06-03 ENCOUNTER — TELEPHONE (OUTPATIENT)
Dept: FAMILY MEDICINE CLINIC | Age: 75
End: 2020-06-03

## 2020-06-03 LAB
ESTIMATED AVERAGE GLUCOSE: 145.6 MG/DL
HBA1C MFR BLD: 6.7 %

## 2020-06-03 NOTE — TELEPHONE ENCOUNTER
Patient forgot what medication you wanted him to use for his feet that can be purchased OTC. Also he would like the results of his glucose test.  Advised Dr. Brooke Hoffmann is out of the office until tomorrow.

## 2020-06-24 ENCOUNTER — HOSPITAL ENCOUNTER (OUTPATIENT)
Dept: NEUROLOGY | Age: 75
Discharge: HOME | End: 2020-06-24
Payer: MEDICARE

## 2020-06-24 ENCOUNTER — HOSPITAL ENCOUNTER (OUTPATIENT)
Dept: VASCULAR LAB | Age: 75
Discharge: HOME | End: 2020-06-24
Payer: MEDICARE

## 2020-06-24 PROCEDURE — 95909 NRV CNDJ TST 5-6 STUDIES: CPT

## 2020-06-24 PROCEDURE — 95886 MUSC TEST DONE W/N TEST COMP: CPT | Performed by: PSYCHIATRY & NEUROLOGY

## 2020-06-24 PROCEDURE — 95909 NRV CNDJ TST 5-6 STUDIES: CPT | Performed by: PSYCHIATRY & NEUROLOGY

## 2020-06-24 PROCEDURE — 95886 MUSC TEST DONE W/N TEST COMP: CPT

## 2020-06-24 PROCEDURE — 93880 EXTRACRANIAL BILAT STUDY: CPT

## 2020-06-30 ENCOUNTER — OFFICE VISIT (OUTPATIENT)
Dept: DERMATOLOGY | Age: 75
End: 2020-06-30
Payer: MEDICARE

## 2020-06-30 VITALS — TEMPERATURE: 98 F

## 2020-06-30 PROCEDURE — 1123F ACP DISCUSS/DSCN MKR DOCD: CPT | Performed by: DERMATOLOGY

## 2020-06-30 PROCEDURE — 99213 OFFICE O/P EST LOW 20 MIN: CPT | Performed by: DERMATOLOGY

## 2020-06-30 PROCEDURE — 4040F PNEUMOC VAC/ADMIN/RCVD: CPT | Performed by: DERMATOLOGY

## 2020-06-30 PROCEDURE — G8427 DOCREV CUR MEDS BY ELIG CLIN: HCPCS | Performed by: DERMATOLOGY

## 2020-06-30 PROCEDURE — G8417 CALC BMI ABV UP PARAM F/U: HCPCS | Performed by: DERMATOLOGY

## 2020-06-30 PROCEDURE — 1036F TOBACCO NON-USER: CPT | Performed by: DERMATOLOGY

## 2020-06-30 NOTE — PROGRESS NOTES
[facility]) Dermatology  [facility], [place], [address]       Aracelis Giles  1945    66 y.o. male     Date of Visit: 2020    Chief Complaint:   Chief Complaint   Patient presents with    Rash     new spot on r foot, around 2 months. I was asked to see this patient by Dr. Pink ref. provider found. History of Present Illness:  Aracelis Giles is a 66 y.o. male who presents with the chief complaint of the followin. Patient complains of a new rash to his bilateral feet extending to his right anterior ankle for about 2 months. He had been applying triamcinolone 0.1% ointment to these areas 1-2 times per day for about 1 month but has not improved. Slightly itchy intermittently. Has noticed dryness and scale to involved areas. Denies involvement to hands, arms, torso. Denies open sores or blisters  -History of nummular eczema located to legs, Cleared with triamcinolone 0.1% ointment, last seen in 2018. Review of Systems:  Constitutional: Reports general sense of well-being   Skin: No new or changing moles, no tendency to develop thick scars, no interval of severe sunburns  Heme: No abnormal bruising or bleeding. Past Medical History, Family History, Surgical History, Medications and Allergies reviewed.     Past Skin Hx:   Patient denies past history of melanoma, NMSC, dysplastic nevi    PFHx: Denies hx of MM or NMSC    PMHx: denies hx of liver disease/cirrhosis     Family History   Problem Relation Age of Onset    Coronary Art Dis Mother     Hypertension Mother     Coronary Art Dis Father     Hypertension Father      Past Medical History:   Diagnosis Date    CAD (coronary artery disease)     DVT     Hyperlipidemia     Hypertension     Stenosis     subclavical    Type II or unspecified type diabetes mellitus without mention of complication, not stated as uncontrolled      Past Surgical History:   Procedure Laterality Date    CARDIAC DEFIBRILLATOR PLACEMENT      Land phototype: 5    -General: A+Ox3, NAD, well-nourished, well-developed. Areas of skin examined as listed above:   1. Somewhat ill-defined erythematous scaly papules and plaques to dorsum of left foot with more well-defined arcuate borders and central clearing to right anterior ankle extending to dorsum of right foot. Tinea pedis and onychomycosis noted to feet and toenails respectively            Assessment and Plan     1. Tinea pedis of both feet        1. Tinea pedis of both feet  With tinea corporis extending to right ankle. - econazole nitrate 1 % cream; Apply topically to affected areas on feet and ankles BID for 2 weeks    -stop triamcinolone ointment    -Patient advised to call the office in 2 weeks, if not improved, may need to treat patient for possible majocchi's and switch to oral terbinafine. Reviewed risk versus benefits of oral terbinafine today and the need for hepatic function panel baseline lab prior to use. The patient is aware that rare cases of liver injury have been reported,Other side effects, such as headaches and rashes, have also been discussed. Return to Clinic: PRN-pt will update in 2 weeks  Discussed plan with patient and/or primary caretaker. Patient to call clinic with any questions / concerns. Reviewed proper use and side effects of treatment(s) and/or medication(s) with patient and/or primary caretaker. AVS provided or is available on [employer]     Note is transcribed using voice recognition software. Inadvertent computerized transcription errors may be present.

## 2020-07-01 NOTE — TELEPHONE ENCOUNTER
Dr Hernandez Bound Patient  Pt c/b #[phone]  Pt stated  Medication that was prescribed is too costly and needs an alternate. Econazole nitrate 1% cream. Also it was sent to wrong pharm the correct pharm is [address] tiffany tenorio.  Ph.[phone]  [facility].[phone]

## 2020-07-02 RX ORDER — KETOCONAZOLE 20 MG/G
CREAM TOPICAL
Qty: 60 G | Refills: 1 | Status: SHIPPED | OUTPATIENT
Start: 2020-07-02 | End: 2021-12-20 | Stop reason: ALTCHOICE

## 2020-07-02 NOTE — TELEPHONE ENCOUNTER
Ketoconazole 2% cream sent to pharmacy. If too expensive, advise pt to buy over-the-counter Lamisil cream with same instructions for application per prescription. -remind pt to call office if not improved or worsened in 2 weeks.

## 2020-07-28 ENCOUNTER — OFFICE VISIT (OUTPATIENT)
Dept: FAMILY MEDICINE CLINIC | Age: 75
End: 2020-07-28
Payer: MEDICARE

## 2020-07-28 VITALS
TEMPERATURE: 97.8 F | SYSTOLIC BLOOD PRESSURE: 134 MMHG | BODY MASS INDEX: 27.46 KG/M2 | WEIGHT: 164.8 LBS | DIASTOLIC BLOOD PRESSURE: 64 MMHG | RESPIRATION RATE: 16 BRPM | OXYGEN SATURATION: 99 % | HEART RATE: 60 BPM | HEIGHT: 65 IN

## 2020-07-28 PROCEDURE — G8417 CALC BMI ABV UP PARAM F/U: HCPCS | Performed by: INTERNAL MEDICINE

## 2020-07-28 PROCEDURE — G8427 DOCREV CUR MEDS BY ELIG CLIN: HCPCS | Performed by: INTERNAL MEDICINE

## 2020-07-28 PROCEDURE — 4040F PNEUMOC VAC/ADMIN/RCVD: CPT | Performed by: INTERNAL MEDICINE

## 2020-07-28 PROCEDURE — 1036F TOBACCO NON-USER: CPT | Performed by: INTERNAL MEDICINE

## 2020-07-28 PROCEDURE — 1123F ACP DISCUSS/DSCN MKR DOCD: CPT | Performed by: INTERNAL MEDICINE

## 2020-07-28 PROCEDURE — 99214 OFFICE O/P EST MOD 30 MIN: CPT | Performed by: INTERNAL MEDICINE

## 2020-07-28 ASSESSMENT — LIFESTYLE VARIABLES: HOW OFTEN DO YOU HAVE A DRINK CONTAINING ALCOHOL: 0

## 2020-07-28 ASSESSMENT — ENCOUNTER SYMPTOMS
RESPIRATORY NEGATIVE: 1
ALLERGIC/IMMUNOLOGIC NEGATIVE: 1
EYES NEGATIVE: 1
GASTROINTESTINAL NEGATIVE: 1

## 2020-07-28 ASSESSMENT — PATIENT HEALTH QUESTIONNAIRE - PHQ9
SUM OF ALL RESPONSES TO PHQ QUESTIONS 1-9: 0
SUM OF ALL RESPONSES TO PHQ QUESTIONS 1-9: 0

## 2020-07-28 NOTE — PROGRESS NOTES
Subjective:      Patient ID: Dayana Alcazar is a 66 y.o. male. HPI Medicare annual wellness visit, subsequent  Prostate: today  Colonoscopy: 3/31/2015. Tubular adenoma. rechx 5 yrs. (appt Oct/Nov)  DEXA: na  Eye: 3/2020  Hearing: recently tested. Bilateral hearing loss. Immunization: flu shot oct/nov. Up to date. MMSE: 30/30  Get Up and Go: passed  Tob: nonsmoker/never. ETOH: none  Caffeine: moderate am  Cardiac Risk Assessment: > 77 yo and has PVD. Living will: yes  Medical power of : yes      Controlled type 2 diabetes mellitus without complication, with long-term current use of insulin (Formerly [facility])  Sugars are good, diet is stable, weight is stable, no reported neuropathy, no change in vision, no claudication, no foot ulcers, no new skin lesions. No change in medications. No c/o with meds. Last eye exam(3/2020). Pure hypercholesterolemia  Stable diet and good wt. No c/o w/ meds. Essential hypertension  No change in meds, no c/o with meds, no chest pain, SOB, palpatations, or syncope. Home bp was good w/ meds. Carotid stenosis, left  S/p L Carotid endarterectomy and doing well. rechx 6/30/2020 was stable (<50% L and 50-65% R)    Right carpal tunnel syndrome  Recent EMG showed severe CTS. Atherosclerosis of native coronary artery of native heart without angina pectoris  The pt has reported no chest pain,palpatations,edema,shortness of breath,syncope, or lightheadedness since their last visit. They have not required any prn meds for angina-like c/o. Last cardio visit was 7/1/2020.  Last GXT was normal.         Past Medical History:   Diagnosis Date    CAD (coronary artery disease)     DVT     Hyperlipidemia     Hypertension     Stenosis     subclavical    Type II or unspecified type diabetes mellitus without mention of complication, not stated as uncontrolled      Current Outpatient Medications   Medication Sig Dispense Refill    ketoconazole (NIZORAL) 2 % cream Apply to affected area(s) BID to feet, toes including web spaces, and ankles for 2 weeks 60 g 1    blood glucose test strips (ACCU-CHEK BRITNEY PLUS) strip TEST TWO TIMES DAILY FOR  DIABETES 200 strip 2    Accu-Chek FastClix Lancets MISC Test two times daily for  diabetes 204 each 3    Insulin Pen Needle (ULTICARE MINI PEN NEEDLES) 31G X 6 MM MISC USE WITH INSULIN  ADMINISTRATION ONCE DAILY 100 each 1    insulin glargine (LANTUS SOLOSTAR) 100 UNIT/ML injection pen INJECT SUBCUTANEOUSLY 10  UNITS NIGHTLY 15 mL 5    glipiZIDE (GLUCOTROL) 5 MG tablet TAKE 1 TABLET BY MOUTH  DAILY 90 tablet 2    atorvastatin (LIPITOR) 20 MG tablet TAKE 1 TABLET BY MOUTH  DAILY 90 tablet 2    metoprolol succinate (TOPROL XL) 50 MG extended release tablet Take 1 tablet by mouth 2 times daily 180 tablet 3    ACCU-CHEK FASTCLIX LANCETS MISC Test two times daily for  diabetes 204 each 3    SITagliptin (JANUVIA) 100 MG tablet Take 1 tablet by mouth daily 90 tablet 2    Lancet Devices (ACCU-CHEK SOFTCLIX LANCET DEV) MISC tewst twice daily for diabetes e11.9 100 each 3    Lancet Devices (PUBLIX LANCET AUTO INJECTOR) MISC 1 applicator by Does not apply route daily 1 each 0    triamcinolone (KENALOG) 0.1 % ointment Apply to affected area twice daily for up to 2 weeks or until improved. 80 g 2    Lancets Misc. (ACCU-CHEK FASTCLIX LANCET) KIT Test twice daily for diabetes e11.9 1 kit 5    Insulin Syringe-Needle U-100 ([employer] INSULIN SYRINGE) 31G X 5/16\" 0.5 ML MISC 1 each by Does not apply route daily Use daily for insulin adminstration 100 each 3    Blood Glucose Monitoring Suppl (ACCU-CHEK BRITNEY PLUS) W/DEVICE KIT 1 each by Does not apply route 2 times daily. TEST TWICE DAILY FOR DIABETES 1 kit 0    aspirin EC 81 MG EC tablet Take 1 tablet by mouth daily. No current facility-administered medications for this visit.       Allergies   Allergen Reactions    Ace Inhibitors Other (See Comments)     angioedema    Ticlid [Ticlopidine Hydrochloride] rash     Social History     Tobacco Use    Smoking status: Never Smoker    Smokeless tobacco: Never Used   Substance Use Topics    Alcohol use: No     Family History   Problem Relation Age of Onset    Coronary Art Dis Mother     Hypertension Mother     Coronary Art Dis Father     Hypertension Father        Review of Systems   Constitutional: Negative. HENT: Negative. Eyes: Negative. Respiratory: Negative. Cardiovascular: Negative. Gastrointestinal: Negative. Endocrine: Negative. Genitourinary: Negative. Musculoskeletal: Negative. Skin: Negative. Allergic/Immunologic: Negative. Neurological: Positive for weakness (R hand) and numbness (R hand). Psychiatric/Behavioral: Negative. Vitals:    07/28/20 1112 07/28/20 1203   BP: 122/60 134/64   Pulse: 60    Resp: 16    Temp: 97.8 °F (36.6 °C)    SpO2: 99%    Weight: 164 lb 12.8 oz (74.8 kg)    Height: 5' 5\" (1.651 m)        Objective:   Physical Exam  Constitutional:       General: He is not in acute distress. Appearance: Normal appearance. He is well-developed. He is not ill-appearing, toxic-appearing or diaphoretic. HENT:      Head: Normocephalic and atraumatic. Right Ear: Hearing, tympanic membrane, ear canal and external ear normal.      Left Ear: Hearing, tympanic membrane, ear canal and external ear normal.      Nose: Nose normal.      Right Sinus: No maxillary sinus tenderness or frontal sinus tenderness. Left Sinus: No maxillary sinus tenderness or frontal sinus tenderness. Mouth/Throat:      Pharynx: Uvula midline. No oropharyngeal exudate. Eyes:      General: Lids are normal. No scleral icterus. Right eye: No discharge. Left eye: No discharge. Extraocular Movements: Extraocular movements intact. Conjunctiva/sclera: Conjunctivae normal.      Pupils: Pupils are equal, round, and reactive to light.       Funduscopic exam:     Right eye: No hemorrhage, exudate, AV nicking, arteriolar narrowing or papilledema. Left eye: No hemorrhage, exudate, AV nicking, arteriolar narrowing or papilledema. Neck:      Musculoskeletal: Full passive range of motion without pain, normal range of motion and neck supple. Thyroid: No thyroid mass or thyromegaly. Vascular: Normal carotid pulses. No carotid bruit, hepatojugular reflux or JVD. Trachea: Trachea normal. No tracheal deviation. Cardiovascular:      Rate and Rhythm: Normal rate and regular rhythm. Occasional extrasystoles are present. Chest Wall: PMI is not displaced. Pulses: Normal pulses. No decreased pulses. Carotid pulses are 2+ on the right side with bruit and 2+ on the left side with bruit. Radial pulses are 2+ on the right side and 2+ on the left side. Femoral pulses are 2+ on the right side and 2+ on the left side. Popliteal pulses are 2+ on the right side and 2+ on the left side. Dorsalis pedis pulses are 2+ on the right side and 2+ on the left side. Posterior tibial pulses are 2+ on the right side and 2+ on the left side. Heart sounds: Normal heart sounds. No murmur. No friction rub. No gallop. Pulmonary:      Effort: Pulmonary effort is normal. No tachypnea, bradypnea, accessory muscle usage or respiratory distress. Breath sounds: Normal breath sounds. No stridor. No decreased breath sounds, wheezing, rhonchi or rales. Chest:      Chest wall: No tenderness. Abdominal:      General: Bowel sounds are normal. There is no distension or abdominal bruit. Palpations: Abdomen is soft. There is no shifting dullness, fluid wave, mass or pulsatile mass. Tenderness: There is no abdominal tenderness. There is no guarding or rebound. Hernia: No hernia is present. There is no hernia in the ventral area or left inguinal area. Genitourinary:     Penis: Normal. No tenderness. Scrotum/Testes: Normal. Cremasteric reflex is present. Prostate: Normal.      Rectum: Normal. Guaiac result negative. Musculoskeletal: Normal range of motion. General: Tenderness (OA knees, low back, hands.) present. No swelling, deformity or signs of injury. Right lower leg: No edema. Left lower leg: No edema. Lymphadenopathy:      Head:      Right side of head: No submental, submandibular, tonsillar, preauricular, posterior auricular or occipital adenopathy. Left side of head: No submental, submandibular, tonsillar, preauricular, posterior auricular or occipital adenopathy. Cervical: No cervical adenopathy. Upper Body:      Right upper body: No supraclavicular or epitrochlear adenopathy. Left upper body: No supraclavicular or epitrochlear adenopathy. Skin:     General: Skin is warm and dry. Capillary Refill: Capillary refill takes less than 2 seconds. Coloration: Skin is not jaundiced or pale. Findings: No abrasion, bruising, erythema, lesion or rash. Nails: There is no clubbing. Neurological:      General: No focal deficit present. Mental Status: He is alert and oriented to person, place, and time. Mental status is at baseline. He is not disoriented. Cranial Nerves: No cranial nerve deficit. Sensory: No sensory deficit. Motor: Weakness (CTS R wrist.) present. No tremor, atrophy, abnormal muscle tone or seizure activity. Coordination: Coordination normal.      Gait: Gait normal.      Deep Tendon Reflexes: Reflexes are normal and symmetric. Reflexes normal. Babinski sign absent on the right side. Babinski sign absent on the left side. Reflex Scores:       Tricep reflexes are 2+ on the right side and 2+ on the left side. Bicep reflexes are 2+ on the right side and 2+ on the left side. Brachioradialis reflexes are 2+ on the right side and 2+ on the left side. Patellar reflexes are 2+ on the right side and 2+ on the left side.        Achilles reflexes are 2+ on the right side and 2+ on the left side. Comments: Diabetic foot exam was normal with intact light touch and vibratory senses. Psychiatric:         Mood and Affect: Mood normal.         Speech: Speech normal.         Behavior: Behavior normal.         Thought Content: Thought content normal.         Judgment: Judgment normal.         Assessment:      Problem   Medicare Annual Wellness Visit, Subsequent   Right Carpal Tunnel Syndrome. EMG severe R CTS. Carotid Stenosis, Left. No new c/o. Recent doppler stable   Essential Hypertension. Good /w meds. Controlled Type 2 Diabetes Mellitus Without Complication, With Long-Term Current Use of Insulin (Former[facility]). Stable w/ diet and meds. Pure Hypercholesterolemia. Stable w/ diet and meds. Atherosclerosis of Native Coronary Artery of Native Heart Without Angina Pectoris. No new c/o. Plan:     All above problems reviewed and the found to be unchanged except for the following :     Medicare Annual Wellness Visit, Subsequent. Flu shot in Oct/Nov.     Right Carpal Tunnel Syndrome. Surgery soon     Carotid Stenosis, Left. Continue meds. Call if new c/o. Essential Hypertension. Continue present meds. Home BP checks. Call if>140/90. Improve diet. Avoid caffeine and salt. Call if new c/o w/ meds. Controlled Type 2 Diabetes Mellitus Without Complication, With Long-Term Current Use of Insulin ([facility]). Will do labs and adjust meds after results are evaluated. To continue to improve diet and lose weight. To follow Diabetic diet. If needs further help w/ Diabetic diet to call and will refer back to dietician. Home sugar checks. To call if sudden change up or down in sugars. To do regular foot checks. Call if new problems. Pure Hypercholesterolemia. Will do labs and adjust if needed. Atherosclerosis of Native Coronary Artery of Native Heart Without Angina Pectoris. To Cardiology as scheduled. Call if new c/o. PAcer check as scheduled.          C Clayton Torres MD   Medicare Annual Wellness Visit  Name: Raymundo Robles Date: 2020   MRN: [id number] Sex: Male   Age: 66 y.o. Ethnicity: Non-/Non    : 1945 Race:       Jasvir Sheikh is here for Medicare AWV (NOT fasting)    Screenings for behavioral, psychosocial and functional/safety risks, and cognitive dysfunction are all negative except as indicated below. These results, as well as other patient data from the [address] form, are documented in Flowsheets linked to this Encounter. Allergies   Allergen Reactions    Ace Inhibitors Other (See Comments)     angioedema    Ticlid [Ticlopidine Hydrochloride]      rash       Prior to Visit Medications    Medication Sig Taking?  Authorizing Provider   ketoconazole (NIZORAL) 2 % cream Apply to affected area(s) BID to feet, toes including web spaces, and ankles for 2 weeks  Carolynn Dunlap DO   blood glucose test strips (ACCU-CHEK BRITNEY PLUS) strip TEST TWO TIMES DAILY FOR  DIABETES  C Clayton Torres MD   Accu-Chek FastClix Lancets MISC Test two times daily for  diabetes  C Clayton Torres MD   Insulin Pen Needle (ULTICARE MINI PEN NEEDLES) 31G X 6 MM MISC USE WITH INSULIN  ADMINISTRATION ONCE DAILY  PAOLA Torres MD   insulin glargine (LANTUS SOLOSTAR) 100 UNIT/ML injection pen INJECT SUBCUTANEOUSLY 10  UNITS NIGHTLY  PAOLA Torres MD   glipiZIDE (GLUCOTROL) 5 MG tablet TAKE 1 TABLET BY MOUTH  DAILY  PAOLA Torres MD   atorvastatin (LIPITOR) 20 MG tablet TAKE 1 TABLET BY MOUTH  DAILY  PAOLA Torres MD   metoprolol succinate (TOPROL XL) 50 MG extended release tablet Take 1 tablet by mouth 2 times daily  PAOAL Torres MD   ACCU-CHEK FASTCLIX LANCETS MISC Test two times daily for  diabetes  C Clayton Torres MD   SITagliptin (JANUVIA) 100 MG tablet Take 1 tablet by mouth daily  Afsaneh Her MD   Lancet Devices (ACCU-CHEK SOFTCLIX LANCET DEV) MISC tewst twice daily for diabetes e11.9  Afsaneh Her MD Lancet Devices (PUBLIX LANCET AUTO INJECTOR) MISC 1 applicator by Does not apply route daily  PAOLA Brown MD   triamcinolone (KENALOG) 0.1 % ointment Apply to affected area twice daily for up to 2 weeks or until improved. Ashish Ernandez, DO   Lancets Misc. (ACCU-CHEK FASTCLIX LANCET) KIT Test twice daily for diabetes e11.9  PAOLA Brown MD   Insulin Syringe-Needle U-100 ([employer] INSULIN SYRINGE) 31G X 5/16\" 0.5 ML MISC 1 each by Does not apply route daily Use daily for insulin adminstration  PAOLA Brown MD   Blood Glucose Monitoring Suppl (ACCU-CHEK BRITNEY PLUS) W/DEVICE KIT 1 each by Does not apply route 2 times daily. Hilaria Pena MD   aspirin EC 81 MG EC tablet Take 1 tablet by mouth daily.   Historical Provider, MD       Past Medical History:   Diagnosis Date    CAD (coronary artery disease)     DVT     Hyperlipidemia     Hypertension     Stenosis     subclavical    Type II or unspecified type diabetes mellitus without mention of complication, not stated as uncontrolled        Past Surgical History:   Procedure Laterality Date    CARDIAC DEFIBRILLATOR PLACEMENT      CORONARY ARTERY BYPASS GRAFT  2004    MT Steven Rogerso INCIS Left 9/10/2018    LEFT CAROTID ENDARTERECTOMY performed by Ronda Puente MD at [address] [place] [address]       Family History   Problem Relation Age of Onset    Coronary Art Dis Mother     Hypertension Mother     Coronary Art Dis Father     Hypertension Father        CareTeam (Including outside providers/suppliers regularly involved in providing care):   Patient Care Team:  Nedra Ghosh MD as PCP - General (Internal Medicine)  Nedra Ghosh MD as PCP - [facility]  Johanna Cheema MD (Inactive) (Cardiology)  Riri Oakley MD as Consulting Physician (Otolaryngology)    Wt Readings from Last 3 Encounters:   07/28/20 164 lb 12.8 oz (74.8 kg)   06/02/20 162 lb 6.4 oz (73.7 kg) 03/12/20 163 lb 12.8 oz (74.3 kg)     Vitals:    07/28/20 1112 07/28/20 1203   BP: 122/60 134/64   Pulse: 60    Resp: 16    Temp: 97.8 °F (36.6 °C)    SpO2: 99%    Weight: 164 lb 12.8 oz (74.8 kg)    Height: 5' 5\" (1.651 m)      Body mass index is 27.42 kg/m². Based upon direct observation of the patient, evaluation of cognition reveals recent and remote memory intact. See H&P above. Patient's complete Health Risk Assessment and screening values have been reviewed and are found in Flowsheets. The following problems were reviewed today and where indicated follow up appointments were made and/or referrals ordered. Positive Risk Factor Screenings with Interventions:     General Health:  General  In general, how would you say your health is?: Good  In the past 7 days, have you experienced any of the following? New or Increased Pain, New or Increased Fatigue, Loneliness, Social Isolation, Stress or Anger?: (!) New or Increased Fatigue  Do you get the social and emotional support that you need?: Yes  Do you have a Living Will?: Yes  General Health Risk Interventions:  · up to date. Hearing/Vision:  No exam data present  Hearing/Vision  Do you or your family notice any trouble with your hearing?: (!) Yes  Do you have difficulty driving, watching TV, or doing any of your daily activities because of your eyesight?: No  Have you had an eye exam within the past year?: Yes  Hearing/Vision Interventions:  · Hearing concerns:  has HAs but doesn't use them. encouraged to use.     Personalized Preventive Plan   Current Health Maintenance Status  Immunization History   Administered Date(s) Administered    Influenza 10/21/2010, 10/15/2012    Influenza Vaccine, unspecified formulation 12/21/2007, 11/13/2008, 10/31/2014, 10/01/2015    Influenza Virus Vaccine 11/14/2013    Influenza Whole 11/13/2008, 10/07/2011, 11/14/2013    Influenza, High Dose (Fluzone 65 yrs and older) 10/31/2014, 10/01/2015, 11/21/2016, 12/05/2017, 10/08/2018    Influenza, Quadv, IM, PF (6 mo and older Fluzone, Flulaval, Fluarix, and 3 yrs and older Afluria) 01/10/2020    Pneumococcal Conjugate 13-valent ([id number]) 01/07/2016    Pneumococcal Conjugate 7-valent (Prevnar7) 10/29/2003    Pneumococcal Polysaccharide ([id number]) 10/29/2003, 01/04/2013    Td, unspecified formulation 02/18/2004, 10/31/2014    Tdap (Boostrix, Adacel) 02/18/2004    Zoster Recombinant (Shingrix) 08/23/2019, 10/10/2019, 12/23/2019        Health Maintenance   Topic Date Due    Lipid screen  08/22/2020    Flu vaccine (1) 09/01/2020    Annual Wellness Visit (AWV)  01/09/2021    Potassium monitoring  06/02/2021    Creatinine monitoring  06/02/2021    DTaP/Tdap/Td vaccine (3 - Td) 10/31/2024    Shingles Vaccine  Completed    Pneumococcal 65+ years Vaccine  Completed    Hepatitis A vaccine  Aged Out    Hib vaccine  Aged Out    Meningococcal (ACWY) vaccine  Aged Out     Recommendations for [employer] Due: see orders and patient instructions/AVS.  . Recommended screening schedule for the next 5-10 years is provided to the patient in written form: see Patient Oscar Mena was seen today for medicare awv.     Diagnoses and all orders for this visit:    Medicare annual wellness visit, subsequent    Controlled type 2 diabetes mellitus without complication, with long-term current use of insulin ([address]    Pure hypercholesterolemia    Essential hypertension    Carotid stenosis, left    Right carpal tunnel syndrome    Atherosclerosis of native coronary artery of native heart without angina pectoris

## 2020-07-28 NOTE — ASSESSMENT & PLAN NOTE
Sugars are good, diet is stable, weight is stable, no reported neuropathy, no change in vision, no claudication, no foot ulcers, no new skin lesions. No change in medications. No c/o with meds. Last eye exam(3/2020).

## 2020-07-28 NOTE — ASSESSMENT & PLAN NOTE
The pt has reported no chest pain,palpatations,edema,shortness of breath,syncope, or lightheadedness since their last visit. They have not required any prn meds for angina-like c/o. Last cardio visit was 7/1/2020.  Last GXT was normal.

## 2020-07-28 NOTE — ASSESSMENT & PLAN NOTE
Prostate: today  Colonoscopy: 3/31/2015. Tubular adenoma. rechx 5 yrs. (appt Oct/Nov)  DEXA: na  Eye: 3/2020  Hearing: recently tested. Bilateral hearing loss. Immunization: flu shot oct/nov. Up to date. MMSE: 30/30  Get Up and Go: passed  Tob: nonsmoker/never. ETOH: none  Caffeine: moderate am  Cardiac Risk Assessment: > 75 yo and has PVD.   Living will: yes  Medical power of : yes

## 2020-07-28 NOTE — PATIENT INSTRUCTIONS
All above problems reviewed and the found to be unchanged except for the following :     Medicare Annual Wellness Visit, Subsequent. Flu shot in Oct/Nov.     Right Carpal Tunnel Syndrome. Surgery soon     Carotid Stenosis, Left. Continue meds. Call if new c/o. Essential Hypertension. Continue present meds. Home BP checks. Call if>140/90. Improve diet. Avoid caffeine and salt. Call if new c/o w/ meds. Controlled Type 2 Diabetes Mellitus Without Complication, With Long-Term Current Use of Insulin (Hcc). Will do labs and adjust meds after results are evaluated. To continue to improve diet and lose weight. To follow Diabetic diet. If needs further help w/ Diabetic diet to call and will refer back to dietician. Home sugar checks. To call if sudden change up or down in sugars. To do regular foot checks. Call if new problems. Pure Hypercholesterolemia. Will do labs and adjust if needed. Atherosclerosis of Native Coronary Artery of Native Heart Without Angina Pectoris. To Cardiology as scheduled. Call if new c/o. PAcer check as scheduled. Prostate: today  Colonoscopy: 3/31/2015. Tubular adenoma. rechx 5 yrs. (appt Oct/Nov)  DEXA: na  Eye: 3/2020  Hearing: recently tested. Bilateral hearing loss. Immunization: flu shot oct/nov. Up to date. MMSE: 30/30  Get Up and Go: passed  Tob: nonsmoker/never. ETOH: none  Caffeine: moderate am  Cardiac Risk Assessment: > 77 yo and has PVD. Living will: yes  Medical power of : yes      Personalized Preventive Plan for Oli Catalan - 7/28/2020  Medicare offers a range of preventive health benefits. Some of the tests and screenings are paid in full while other may be subject to a deductible, co-insurance, and/or copay. Some of these benefits include a comprehensive review of your medical history including lifestyle, illnesses that may run in your family, and various assessments and screenings as appropriate.     After reviewing your medical record and screening and assessments performed today your provider may have ordered immunizations, labs, imaging, and/or referrals for you. A list of these orders (if applicable) as well as your Preventive Care list are included within your After Visit Summary for your review. Other Preventive Recommendations:    · A preventive eye exam performed by an eye specialist is recommended every 1-2 years to screen for glaucoma; cataracts, macular degeneration, and other eye disorders. · A preventive dental visit is recommended every 6 months. · Try to get at least 150 minutes of exercise per week or 10,000 steps per day on a pedometer . · Order or download the FREE \"Exercise & Physical Activity: Your Everyday Guide\" from [employer]. Call 8-648.231.3961 or search [employer] online. · You need 0398-8803 mg of calcium and 9544-3198 IU of vitamin D per day. It is possible to meet your calcium requirement with diet alone, but a vitamin D supplement is usually necessary to meet this goal.  · When exposed to the sun, use a sunscreen that protects against both UVA and UVB radiation with an SPF of 30 or greater. Reapply every 2 to 3 hours or after sweating, drying off with a towel, or swimming. · Always wear a seat belt when traveling in a car. Always wear a helmet when riding a bicycle or motorcycle.

## 2020-08-06 DIAGNOSIS — R53.83 FATIGUE, UNSPECIFIED TYPE: ICD-10-CM

## 2020-08-06 DIAGNOSIS — Z12.5 SCREENING FOR PROSTATE CANCER: ICD-10-CM

## 2020-08-06 DIAGNOSIS — I10 ESSENTIAL HYPERTENSION: ICD-10-CM

## 2020-08-06 DIAGNOSIS — G56.01 RIGHT CARPAL TUNNEL SYNDROME: ICD-10-CM

## 2020-08-06 DIAGNOSIS — E11.9 CONTROLLED TYPE 2 DIABETES MELLITUS WITHOUT COMPLICATION, WITH LONG-TERM CURRENT USE OF INSULIN (HCC): ICD-10-CM

## 2020-08-06 DIAGNOSIS — Z79.4 CONTROLLED TYPE 2 DIABETES MELLITUS WITHOUT COMPLICATION, WITH LONG-TERM CURRENT USE OF INSULIN (HCC): ICD-10-CM

## 2020-08-06 DIAGNOSIS — I25.10 ATHEROSCLEROSIS OF NATIVE CORONARY ARTERY OF NATIVE HEART WITHOUT ANGINA PECTORIS: ICD-10-CM

## 2020-08-06 DIAGNOSIS — E78.00 PURE HYPERCHOLESTEROLEMIA: ICD-10-CM

## 2020-08-06 LAB
ALBUMIN SERPL-MCNC: 4.4 G/DL (ref 3.4–5)
ALP BLD-CCNC: 50 U/L (ref 40–129)
ALT SERPL-CCNC: 17 U/L (ref 10–40)
ANION GAP SERPL CALCULATED.3IONS-SCNC: 13 MMOL/L (ref 3–16)
AST SERPL-CCNC: 16 U/L (ref 15–37)
BILIRUB SERPL-MCNC: 0.6 MG/DL (ref 0–1)
BILIRUBIN DIRECT: <0.2 MG/DL (ref 0–0.3)
BILIRUBIN, INDIRECT: NORMAL MG/DL (ref 0–1)
BUN BLDV-MCNC: 33 MG/DL (ref 7–20)
CALCIUM SERPL-MCNC: 9.2 MG/DL (ref 8.3–10.6)
CHLORIDE BLD-SCNC: 103 MMOL/L (ref 99–110)
CHOLESTEROL, TOTAL: 135 MG/DL (ref 0–199)
CO2: 21 MMOL/L (ref 21–32)
CREAT SERPL-MCNC: 1.1 MG/DL (ref 0.8–1.3)
GFR AFRICAN AMERICAN: >60
GFR NON-AFRICAN AMERICAN: >60
GLUCOSE BLD-MCNC: 131 MG/DL (ref 70–99)
HCT VFR BLD CALC: 44.5 % (ref 40.5–52.5)
HDLC SERPL-MCNC: 45 MG/DL (ref 40–60)
HEMOGLOBIN: 14.4 G/DL (ref 13.5–17.5)
LDL CHOLESTEROL CALCULATED: 76 MG/DL
MCH RBC QN AUTO: 28.1 PG (ref 26–34)
MCHC RBC AUTO-ENTMCNC: 32.4 G/DL (ref 31–36)
MCV RBC AUTO: 86.6 FL (ref 80–100)
PDW BLD-RTO: 15.2 % (ref 12.4–15.4)
PHOSPHORUS: 4 MG/DL (ref 2.5–4.9)
PLATELET # BLD: 137 K/UL (ref 135–450)
PMV BLD AUTO: 9.3 FL (ref 5–10.5)
POTASSIUM SERPL-SCNC: 4.7 MMOL/L (ref 3.5–5.1)
PROSTATE SPECIFIC ANTIGEN: 0.33 NG/ML (ref 0–4)
RBC # BLD: 5.14 M/UL (ref 4.2–5.9)
SODIUM BLD-SCNC: 137 MMOL/L (ref 136–145)
TOTAL PROTEIN: 7.1 G/DL (ref 6.4–8.2)
TRIGL SERPL-MCNC: 72 MG/DL (ref 0–150)
TSH SERPL DL<=0.05 MIU/L-ACNC: 0.96 UIU/ML (ref 0.27–4.2)
VLDLC SERPL CALC-MCNC: 14 MG/DL
WBC # BLD: 6.3 K/UL (ref 4–11)

## 2020-08-07 LAB
ESTIMATED AVERAGE GLUCOSE: 145.6 MG/DL
HBA1C MFR BLD: 6.7 %

## 2020-08-10 ENCOUNTER — TELEPHONE (OUTPATIENT)
Dept: FAMILY MEDICINE CLINIC | Age: 75
End: 2020-08-10

## 2020-09-08 ENCOUNTER — OFFICE VISIT (OUTPATIENT)
Dept: FAMILY MEDICINE CLINIC | Age: 75
End: 2020-09-08
Payer: MEDICARE

## 2020-09-08 VITALS
BODY MASS INDEX: 27.56 KG/M2 | DIASTOLIC BLOOD PRESSURE: 60 MMHG | TEMPERATURE: 97.9 F | RESPIRATION RATE: 16 BRPM | SYSTOLIC BLOOD PRESSURE: 128 MMHG | HEART RATE: 60 BPM | HEIGHT: 65 IN | WEIGHT: 165.4 LBS | OXYGEN SATURATION: 98 %

## 2020-09-08 LAB
CREATININE URINE: 57.1 MG/DL (ref 39–259)
MICROALBUMIN UR-MCNC: <1.2 MG/DL
MICROALBUMIN/CREAT UR-RTO: NORMAL MG/G (ref 0–30)

## 2020-09-08 PROCEDURE — 4040F PNEUMOC VAC/ADMIN/RCVD: CPT | Performed by: INTERNAL MEDICINE

## 2020-09-08 PROCEDURE — 1123F ACP DISCUSS/DSCN MKR DOCD: CPT | Performed by: INTERNAL MEDICINE

## 2020-09-08 PROCEDURE — 1036F TOBACCO NON-USER: CPT | Performed by: INTERNAL MEDICINE

## 2020-09-08 PROCEDURE — G8428 CUR MEDS NOT DOCUMENT: HCPCS | Performed by: INTERNAL MEDICINE

## 2020-09-08 PROCEDURE — 99214 OFFICE O/P EST MOD 30 MIN: CPT | Performed by: INTERNAL MEDICINE

## 2020-09-08 PROCEDURE — G8417 CALC BMI ABV UP PARAM F/U: HCPCS | Performed by: INTERNAL MEDICINE

## 2020-09-08 RX ORDER — LISINOPRIL 10 MG/1
TABLET ORAL
COMMUNITY
Start: 2020-06-25 | End: 2020-09-08 | Stop reason: SINTOL

## 2020-09-08 RX ORDER — AMLODIPINE BESYLATE 5 MG/1
5 TABLET ORAL DAILY
Qty: 30 TABLET | Refills: 5 | Status: SHIPPED | OUTPATIENT
Start: 2020-09-08 | End: 2021-02-02 | Stop reason: SDUPTHER

## 2020-09-08 RX ORDER — LISINOPRIL 10 MG/1
10 TABLET ORAL DAILY
Qty: 30 TABLET | Refills: 5 | Status: CANCELLED | OUTPATIENT
Start: 2020-09-08

## 2020-09-08 ASSESSMENT — ENCOUNTER SYMPTOMS
ALLERGIC/IMMUNOLOGIC NEGATIVE: 1
RESPIRATORY NEGATIVE: 1
GASTROINTESTINAL NEGATIVE: 1

## 2020-09-08 NOTE — PATIENT INSTRUCTIONS
ASSESSMENT/PLAN:  1. Controlled type 2 diabetes mellitus without complication, with long-term current use of insulin ([facility] [address]  Will do labs and adjust meds after results are evaluated. To continue to improve diet and lose weight. To follow Diabetic diet. If needs further help w/ Diabetic diet to call and will refer back to dietician. Home sugar checks. To call if sudden change up or down in sugars. To do regular foot checks. Call if new problems. 2. Pure hypercholesterolemia  Will do labs next visit and adjust meds if needed. To improve diet and exercise. To lose some weight. Call if need further assistance. Call if new c/o w/ meds. Next lab is due next visit    3. Subclavian arterial stenosis (HCC)  No new c/o. Call if new c/o.    4. Essential hypertension  To stop Lisinopril (and Ramipril) and begin Amlodipine 5 mg daily. Home BP checks. Call if>140/90. Improve diet. Avoid caffeine and salt. Call if new c/o w/ meds. 5. Carotid stenosis, left  To rechx Doppler in 1 yr call if new c/o.    6. Atherosclerosis of native coronary artery of native heart without angina pectoris  Doing well w/ present meds. No new c/o. To Cardiology as scheduled. Call if new c/o.      RTSM 3 mo

## 2020-09-08 NOTE — PROGRESS NOTES
(LIPITOR) 20 MG tablet TAKE 1 TABLET BY MOUTH  DAILY Yes Annabel Reese MD   SITagliptin (JANUVIA) 100 MG tablet Take 1 tablet by mouth daily Yes Annabel Reese MD   aspirin EC 81 MG EC tablet Take 1 tablet by mouth daily. Yes Historical Provider, MD   ketoconazole (NIZORAL) 2 % cream Apply to affected area(s) BID to feet, toes including web spaces, and ankles for 2 weeks  Diana Olivas, DO   blood glucose test strips (ACCU-CHEK BRITNEY PLUS) strip TEST TWO TIMES DAILY FOR  DIABETES  PAOLA Gerard MD   Accu-Chek FastClix Lancets MISC Test two times daily for  diabetes  PAOLA Gerard MD   Insulin Pen Needle (ULTICARE MINI PEN NEEDLES) 31G X 6 MM MISC USE WITH INSULIN  ADMINISTRATION ONCE DAILY  PAOLA Gerard MD   metoprolol succinate (TOPROL XL) 50 MG extended release tablet Take 1 tablet by mouth 2 times daily  PAOLA Gerard MD   ACCU-WINSTON FASTCLIX LANCETS MISC Test two times daily for  diabetes  PAOLA Gerard MD   Lancet Devices (ACCU-CHEK SOFTCLIX LANCET DEV) MISC tewst twice daily for diabetes e11.9  PAOLA Gerard MD   Lancet Devices (PUBLIX LANCET Dwayne Gomez) MISC 1 applicator by Does not apply route daily  Annabel Reese MD   triamcinolone (KENALOG) 0.1 % ointment Apply to affected area twice daily for up to 2 weeks or until improved. Diana Olivas DO   Lancets Misc. (ACCU-CHEK FASTCLIX LANCET) KIT Test twice daily for diabetes e11.9  PAOLA Gerard MD   Insulin Syringe-Needle U-100 ([employer] INSULIN SYRINGE) 31G X 5/16\" 0.5 ML MISC 1 each by Does not apply route daily Use daily for insulin adminstration  PAOLA Gerard MD   Blood Glucose Monitoring Suppl (ACCU-CHEK BRITNEY PLUS) W/DEVICE KIT 1 each by Does not apply route 2 times daily.  TEST TWICE DAILY FOR DIABETES  PAOLA Gerard MD        Allergies   Allergen Reactions    Ace Inhibitors Other (See Comments)     angioedema    Ticlid [Ticlopidine Hydrochloride]      rash       Past Medical History:   Diagnosis Date    CAD (coronary artery disease)     DVT     Hyperlipidemia     Hypertension     Stenosis     subclavical    Type II or unspecified type diabetes mellitus without mention of complication, not stated as uncontrolled        Past Surgical History:   Procedure Laterality Date    CARDIAC DEFIBRILLATOR PLACEMENT      CORONARY ARTERY BYPASS GRAFT  2004    VT THROMBOENDARTECTMY NECK,NECK INCIS Left 9/10/2018    LEFT CAROTID ENDARTERECTOMY performed by Anila Rendon MD at [address] [place] [address] History     Socioeconomic History    Marital status:      Spouse name: Not on file    Number of children: Not on file    Years of education: Not on file    Highest education level: Not on file   Occupational History    Not on file   Social Needs    Financial resource strain: Not on file    Food insecurity     Worry: Not on file     Inability: Not on file    Transportation needs     Medical: Not on file     Non-medical: Not on file   Tobacco Use    Smoking status: Never Smoker    Smokeless tobacco: Never Used   Substance and Sexual Activity    Alcohol use: No    Drug use: No    Sexual activity: Not on file   Lifestyle    Physical activity     Days per week: Not on file     Minutes per session: Not on file    Stress: Not on file   Relationships    Social connections     Talks on phone: Not on file     Gets together: Not on file     Attends Holiness service: Not on file     Active member of club or organization: Not on file     Attends meetings of clubs or organizations: Not on file     Relationship status: Not on file    Intimate partner violence     Fear of current or ex partner: Not on file     Emotionally abused: Not on file     Physically abused: Not on file     Forced sexual activity: Not on file   Other Topics Concern    Not on file   Social History Narrative    Not on file        Family History   Problem Relation Age of Onset    Coronary Art Dis Mother     Hypertension Pulmonary:      Effort: Pulmonary effort is normal. No tachypnea, bradypnea, accessory muscle usage or respiratory distress. Breath sounds: Normal breath sounds. No decreased breath sounds, wheezing, rhonchi or rales. Abdominal:      Hernia: No hernia is present. There is no hernia in the ventral area. Lymphadenopathy:      Cervical: No cervical adenopathy. Skin:     General: Skin is warm and dry. Capillary Refill: Capillary refill takes less than 2 seconds. Coloration: Skin is not jaundiced or pale. Findings: No abrasion, bruising, erythema, lesion or rash. Nails: There is no clubbing. Neurological:      General: No focal deficit present. Mental Status: He is alert and oriented to person, place, and time. Mental status is at baseline. He is not disoriented. Cranial Nerves: No cranial nerve deficit. Sensory: No sensory deficit. Motor: No weakness, tremor, atrophy or abnormal muscle tone. Coordination: Coordination normal.      Gait: Gait normal.      Comments: Diabetic foot exam was normal with intact light touch and vibratory senses. Does have some calloses. Referred to podiatrist.      Psychiatric:         Mood and Affect: Mood normal.         Speech: Speech normal.         Behavior: Behavior normal.         Thought Content: Thought content normal.         Judgment: Judgment normal.         ASSESSMENT/PLAN:  1. Controlled type 2 diabetes mellitus without complication, with long-term current use of insulin ([address]  Will do labs and adjust meds after results are evaluated. To continue to improve diet and lose weight. To follow Diabetic diet. If needs further help w/ Diabetic diet to call and will refer back to dietician. Home sugar checks. To call if sudden change up or down in sugars. To do regular foot checks. Call if new problems. 2. Pure hypercholesterolemia  Will do labs next visit and adjust meds if needed. To improve diet and exercise.  To lose some weight. Call if need further assistance. Call if new c/o w/ meds. Next lab is due next visit    3. Subclavian arterial stenosis (HCC)  No new c/o. Call if new c/o.    4. Essential hypertension  To stop Lisinopril (and Ramipril) and begin Amlodipine 5 mg daily. Home BP checks. Call if>140/90. Improve diet. Avoid caffeine and salt. Call if new c/o w/ meds. 5. Carotid stenosis, left  To rechx Doppler in 1 yr call if new c/o.    6. Atherosclerosis of native coronary artery of native heart without angina pectoris  Doing well w/ present meds. No new c/o. To Cardiology as scheduled. Call if new c/o. RTSM 3 mo    An  electronic signature was used to authenticate this note.     --Lawrence Polk MD on 9/8/2020 at 11:56 AM

## 2020-10-08 RX ORDER — LISINOPRIL 10 MG/1
TABLET ORAL
Qty: 30 TABLET | Refills: 4 | Status: SHIPPED
Start: 2020-10-08 | End: 2021-05-13 | Stop reason: ALTCHOICE

## 2020-11-16 RX ORDER — GLIPIZIDE 5 MG/1
TABLET ORAL
Qty: 90 TABLET | Refills: 3 | Status: SHIPPED | OUTPATIENT
Start: 2020-11-16 | End: 2021-11-22

## 2020-12-07 RX ORDER — PEN NEEDLE, DIABETIC 29 G X1/2"
NEEDLE, DISPOSABLE MISCELLANEOUS
Qty: 100 EACH | Refills: 0 | Status: SHIPPED | OUTPATIENT
Start: 2020-12-07 | End: 2021-03-10

## 2020-12-11 PROBLEM — I49.5 SICK SINUS SYNDROME (HCC): Status: ACTIVE | Noted: 2020-12-11

## 2021-01-05 ENCOUNTER — TELEPHONE (OUTPATIENT)
Dept: FAMILY MEDICINE CLINIC | Age: 76
End: 2021-01-05

## 2021-01-05 NOTE — TELEPHONE ENCOUNTER
Patient's glucose levels have been over 200 for the last month. He has been trying to control it with diet and exercise. When he takes the medication is goes down to 120. After dinner it goes back to over 200. He would like to know if he should increase his medication?

## 2021-01-05 NOTE — TELEPHONE ENCOUNTER
S/w pt  Home readings have been  In 200s as high at 375  But only 1 time- pt declined to schedule with another provider to be seen sooner- has appt on 2/2 w/ Dr Chyna Hassan.  Pt advised to keep tracking numbers and call end of next week to see if Dr Chyna Hassan will be able to see him in office sooner

## 2021-01-18 NOTE — TELEPHONE ENCOUNTER
1/5  117     288  1/6  133    155       160  1/7   211                 167  1/8   163                  170  1/9    166    160        170  1/10   124                  166  1/11  174                254  1/12   170                 251  1/13   170   109      250  1/14  154              209  1/15   143            201  1/816   161          210  1/17    210              212  1/18    134       Has appt 2/2

## 2021-01-21 DIAGNOSIS — Z79.4 CONTROLLED TYPE 2 DIABETES MELLITUS WITHOUT COMPLICATION, WITH LONG-TERM CURRENT USE OF INSULIN (HCC): Primary | ICD-10-CM

## 2021-01-21 DIAGNOSIS — E11.9 CONTROLLED TYPE 2 DIABETES MELLITUS WITHOUT COMPLICATION, WITH LONG-TERM CURRENT USE OF INSULIN (HCC): Primary | ICD-10-CM

## 2021-01-21 RX ORDER — BLOOD SUGAR DIAGNOSTIC
STRIP MISCELLANEOUS
Qty: 200 STRIP | Refills: 2 | Status: SHIPPED | OUTPATIENT
Start: 2021-01-21 | End: 2021-07-28

## 2021-01-21 RX ORDER — LANCETS
EACH MISCELLANEOUS
Qty: 204 EACH | Refills: 3 | Status: SHIPPED | OUTPATIENT
Start: 2021-01-21 | End: 2021-11-22

## 2021-01-21 NOTE — TELEPHONE ENCOUNTER
[facility] [phone] ([facility])    is requesting refill(s) of medication Accu-Chek FastClix Lancets MISC to preferred pharmacy [facility]    Last OV 9/8/20 (pertaining to medication)   Last refill 6/2/20 (per medication requested)  Next office visit scheduled or attempted Yes  Date 2/2/21  If No, reason made. [facility] [phone] (home)    is requesting refill(s) of medication blood glucose test strips (ACCU-CHEK BRITNEY PLUS) strip to preferred pharmacy [facility]    Last OV 9/8/20 (pertaining to medication)   Last refill 6/2/20 (per medication requested)  Next office visit scheduled or attempted Yes  Date 2/2/21  If No, reason made.

## 2021-01-22 RX ORDER — INSULIN GLARGINE 100 [IU]/ML
14 INJECTION, SOLUTION SUBCUTANEOUS NIGHTLY
Qty: 15 ML | Refills: 5 | Status: SHIPPED | OUTPATIENT
Start: 2021-01-22 | End: 2021-02-02

## 2021-01-22 RX ORDER — INSULIN ASPART 100 [IU]/ML
5 INJECTION, SOLUTION INTRAVENOUS; SUBCUTANEOUS DAILY
Qty: 5 PEN | Refills: 3 | Status: SHIPPED | OUTPATIENT
Start: 2021-01-22 | End: 2021-02-02

## 2021-01-26 ENCOUNTER — TELEPHONE (OUTPATIENT)
Dept: ADMINISTRATIVE | Age: 76
End: 2021-01-26

## 2021-01-26 NOTE — TELEPHONE ENCOUNTER
Submitted PA for Insulin Aspart FlexPen 100UNIT/ML pen-injectors, Key: [id number]. Medication has been DENIED. Denial letter attached. Please notify patient. Thank you.

## 2021-01-28 RX ORDER — INSULIN LISPRO 100 [IU]/ML
INJECTION, SOLUTION INTRAVENOUS; SUBCUTANEOUS
Qty: 5 PEN | Refills: 5 | Status: SHIPPED | OUTPATIENT
Start: 2021-01-28 | End: 2021-05-13 | Stop reason: ALTCHOICE

## 2021-02-02 ENCOUNTER — OFFICE VISIT (OUTPATIENT)
Dept: FAMILY MEDICINE CLINIC | Age: 76
End: 2021-02-02
Payer: MEDICARE

## 2021-02-02 VITALS
OXYGEN SATURATION: 98 % | TEMPERATURE: 98.3 F | RESPIRATION RATE: 16 BRPM | HEIGHT: 65 IN | BODY MASS INDEX: 27.36 KG/M2 | DIASTOLIC BLOOD PRESSURE: 64 MMHG | HEART RATE: 60 BPM | SYSTOLIC BLOOD PRESSURE: 134 MMHG | WEIGHT: 164.2 LBS

## 2021-02-02 DIAGNOSIS — I49.5 SICK SINUS SYNDROME (HCC): ICD-10-CM

## 2021-02-02 DIAGNOSIS — Z79.4 CONTROLLED TYPE 2 DIABETES MELLITUS WITHOUT COMPLICATION, WITH LONG-TERM CURRENT USE OF INSULIN (HCC): Primary | ICD-10-CM

## 2021-02-02 DIAGNOSIS — E11.9 CONTROLLED TYPE 2 DIABETES MELLITUS WITHOUT COMPLICATION, WITH LONG-TERM CURRENT USE OF INSULIN (HCC): Primary | ICD-10-CM

## 2021-02-02 DIAGNOSIS — I10 ESSENTIAL HYPERTENSION: ICD-10-CM

## 2021-02-02 DIAGNOSIS — I25.10 ATHEROSCLEROSIS OF NATIVE CORONARY ARTERY OF NATIVE HEART WITHOUT ANGINA PECTORIS: ICD-10-CM

## 2021-02-02 DIAGNOSIS — E78.00 PURE HYPERCHOLESTEROLEMIA: ICD-10-CM

## 2021-02-02 LAB
ALBUMIN SERPL-MCNC: 4.2 G/DL (ref 3.4–5)
ALP BLD-CCNC: 70 U/L (ref 40–129)
ALT SERPL-CCNC: 16 U/L (ref 10–40)
ANION GAP SERPL CALCULATED.3IONS-SCNC: 11 MMOL/L (ref 3–16)
AST SERPL-CCNC: 15 U/L (ref 15–37)
BILIRUB SERPL-MCNC: 0.8 MG/DL (ref 0–1)
BILIRUBIN DIRECT: <0.2 MG/DL (ref 0–0.3)
BILIRUBIN, INDIRECT: NORMAL MG/DL (ref 0–1)
BUN BLDV-MCNC: 22 MG/DL (ref 7–20)
CALCIUM SERPL-MCNC: 9.7 MG/DL (ref 8.3–10.6)
CHLORIDE BLD-SCNC: 104 MMOL/L (ref 99–110)
CHOLESTEROL, TOTAL: 126 MG/DL (ref 0–199)
CO2: 25 MMOL/L (ref 21–32)
CREAT SERPL-MCNC: 1.1 MG/DL (ref 0.8–1.3)
GFR AFRICAN AMERICAN: >60
GFR NON-AFRICAN AMERICAN: >60
GLUCOSE BLD-MCNC: 138 MG/DL (ref 70–99)
HBA1C MFR BLD: 7.9 %
HDLC SERPL-MCNC: 47 MG/DL (ref 40–60)
LDL CHOLESTEROL CALCULATED: 61 MG/DL
POTASSIUM SERPL-SCNC: 4.4 MMOL/L (ref 3.5–5.1)
SODIUM BLD-SCNC: 140 MMOL/L (ref 136–145)
TOTAL PROTEIN: 7.3 G/DL (ref 6.4–8.2)
TRIGL SERPL-MCNC: 92 MG/DL (ref 0–150)
VLDLC SERPL CALC-MCNC: 18 MG/DL

## 2021-02-02 PROCEDURE — 3051F HG A1C>EQUAL 7.0%<8.0%: CPT | Performed by: INTERNAL MEDICINE

## 2021-02-02 PROCEDURE — 83036 HEMOGLOBIN GLYCOSYLATED A1C: CPT | Performed by: INTERNAL MEDICINE

## 2021-02-02 PROCEDURE — 1123F ACP DISCUSS/DSCN MKR DOCD: CPT | Performed by: INTERNAL MEDICINE

## 2021-02-02 PROCEDURE — G8427 DOCREV CUR MEDS BY ELIG CLIN: HCPCS | Performed by: INTERNAL MEDICINE

## 2021-02-02 PROCEDURE — G8417 CALC BMI ABV UP PARAM F/U: HCPCS | Performed by: INTERNAL MEDICINE

## 2021-02-02 PROCEDURE — G8484 FLU IMMUNIZE NO ADMIN: HCPCS | Performed by: INTERNAL MEDICINE

## 2021-02-02 PROCEDURE — 1036F TOBACCO NON-USER: CPT | Performed by: INTERNAL MEDICINE

## 2021-02-02 PROCEDURE — 99214 OFFICE O/P EST MOD 30 MIN: CPT | Performed by: INTERNAL MEDICINE

## 2021-02-02 PROCEDURE — 4040F PNEUMOC VAC/ADMIN/RCVD: CPT | Performed by: INTERNAL MEDICINE

## 2021-02-02 PROCEDURE — 36415 COLL VENOUS BLD VENIPUNCTURE: CPT | Performed by: INTERNAL MEDICINE

## 2021-02-02 RX ORDER — AMLODIPINE BESYLATE 5 MG/1
5 TABLET ORAL DAILY
Qty: 30 TABLET | Refills: 5 | Status: SHIPPED | OUTPATIENT
Start: 2021-02-02 | End: 2021-05-24 | Stop reason: SDUPTHER

## 2021-02-02 RX ORDER — INSULIN GLARGINE 100 [IU]/ML
INJECTION, SOLUTION SUBCUTANEOUS
Qty: 5 PEN | Refills: 5 | Status: SHIPPED | OUTPATIENT
Start: 2021-02-02 | End: 2021-05-17 | Stop reason: SDUPTHER

## 2021-02-02 RX ORDER — INSULIN ASPART 100 [IU]/ML
INJECTION, SOLUTION INTRAVENOUS; SUBCUTANEOUS
Qty: 5 PEN | Refills: 3 | Status: SHIPPED | OUTPATIENT
Start: 2021-02-02 | End: 2022-06-21 | Stop reason: SDUPTHER

## 2021-02-02 ASSESSMENT — ENCOUNTER SYMPTOMS
GASTROINTESTINAL NEGATIVE: 1
ALLERGIC/IMMUNOLOGIC NEGATIVE: 1
RESPIRATORY NEGATIVE: 1

## 2021-02-02 NOTE — ASSESSMENT & PLAN NOTE
Sugars are up some sea in evening and HbA1c is up to 7.9%, diet is stable, weight is stable, no reported neuropathy, no change in vision, no claudication, no foot ulcers, no new skin lesions. Not medications as Rx. No c/o with meds. Last eye exam(3/2020).

## 2021-02-02 NOTE — PROGRESS NOTES
Subjective:      Patient ID: Vanessa Ernandez is a 66 y.o. male. HPI  Controlled type 2 diabetes mellitus without complication, with long-term current use of insulin (HCC)  Sugars are up some sea in evening and HbA1c is up to 7.9%, diet is stable, weight is stable, no reported neuropathy, no change in vision, no claudication, no foot ulcers, no new skin lesions. Not medications as Rx. No c/o with meds. Last eye exam(3/2020). Pure hypercholesterolemia  Stable diet and good wt. No c/o w/ meds. Essential hypertension  No change in meds, no c/o with meds, no chest pain, SOB, palpatations, or syncope. Home bp was good w/ meds. Atherosclerosis of native coronary artery of native heart without angina pectoris  The pt has reported no chest pain,palpatations,edema,shortness of breath,syncope, or lightheadedness since their last visit. They have not required any prn meds for angina-like c/o. Last cardio visit was 1/19/2021. Last GXT was normal. Cardiology to order ECHO next yr(2022). Sick sinus syndrome (HCC)  No new c/o. Recent device check was good. Possible battery life 1.5 yrs+. No new c/o. Review of Systems   Constitutional: Positive for fatigue. Respiratory: Negative. Cardiovascular: Negative. Gastrointestinal: Negative. Endocrine: Negative. Genitourinary: Negative. Musculoskeletal: Negative. Skin: Negative. Allergic/Immunologic: Negative. Neurological: Negative. Hematological: Negative. Psychiatric/Behavioral: Negative. Vitals:    02/02/21 1109 02/02/21 1140   BP: 138/70 134/64   Pulse: 60    Resp: 16    Temp: 98.3 °F (36.8 °C)    SpO2: 98%    Weight: 164 lb 3.2 oz (74.5 kg)    Height: 5' 5\" (1.651 m)      Objective:   Physical Exam  Constitutional:       General: He is not in acute distress. Appearance: Normal appearance. He is well-developed. He is not ill-appearing, toxic-appearing or diaphoretic. HENT:      Head: Normocephalic and atraumatic. Eyes:      Extraocular Movements: Extraocular movements intact. Conjunctiva/sclera: Conjunctivae normal.      Pupils: Pupils are equal, round, and reactive to light. Neck:      Musculoskeletal: Full passive range of motion without pain, normal range of motion and neck supple. No neck rigidity or muscular tenderness. Thyroid: No thyroid mass or thyromegaly. Vascular: Normal carotid pulses. No carotid bruit, hepatojugular reflux or JVD. Trachea: Trachea and phonation normal.   Cardiovascular:      Rate and Rhythm: Normal rate and regular rhythm. No extrasystoles are present. Chest Wall: PMI is not displaced. Pulses: Normal pulses. No decreased pulses. Carotid pulses are 2+ on the right side and 2+ on the left side. Radial pulses are 2+ on the right side and 2+ on the left side. Femoral pulses are 2+ on the right side and 2+ on the left side. Popliteal pulses are 2+ on the right side and 2+ on the left side. Dorsalis pedis pulses are 2+ on the right side and 2+ on the left side. Posterior tibial pulses are 2+ on the right side and 2+ on the left side. Heart sounds: Normal heart sounds. No murmur. No friction rub. No gallop. Pulmonary:      Effort: Pulmonary effort is normal. No tachypnea, bradypnea, accessory muscle usage or respiratory distress. Breath sounds: Normal breath sounds. No decreased breath sounds, wheezing, rhonchi or rales. Abdominal:      Hernia: No hernia is present. There is no hernia in the ventral area. Musculoskeletal: Normal range of motion. General: No tenderness. Right lower leg: No edema. Left lower leg: No edema. Lymphadenopathy:      Cervical: No cervical adenopathy. Skin:     General: Skin is warm and dry. Capillary Refill: Capillary refill takes less than 2 seconds. Coloration: Skin is not jaundiced or pale. Findings: No abrasion, bruising, erythema, lesion or rash. Nails: There is no clubbing. Neurological:      General: No focal deficit present. Mental Status: He is alert and oriented to person, place, and time. Mental status is at baseline. He is not disoriented. Cranial Nerves: No cranial nerve deficit. Sensory: No sensory deficit. Motor: No weakness, tremor, atrophy or abnormal muscle tone. Coordination: Coordination normal.      Gait: Gait normal.      Comments: Diabetic foot exam was normal with intact light touch and vibratory senses. Psychiatric:         Mood and Affect: Mood normal.         Speech: Speech normal.         Behavior: Behavior normal.         Thought Content: Thought content normal.         Judgment: Judgment normal.         Assessment / Plan:      Sick Sinus Syndrome (Hcc). Doing well. No palpitations, recent check. Call if new c/o. Essential Hypertension. Continue present meds. Home BP checks. Call if>140/90. Improve diet. Avoid caffeine and salt. Call if new c/o w/ meds. Controlled Type 2 Diabetes Mellitus Without Complication, With Long-Term Current Use of Insulin ([facility]). Will change insulin. To begin Lantus(insulin glargine) 10 units prior to breakfast and evening meals, Novalog(insulin aspart) 5 units prior to breakfast and evening meals. Home sugars call if sugars not improving or if sugars too low(<90). Pure Hypercholesterolemia. Continue diet and exercise as tolerated. Continue meds. Call if new c/o. Will do labs. Atherosclerosis of Native Coronary Artery of Native Heart Without Angina Pectoris. To Cardiology as scheduled. Call if new c/o. LAB RESULT/REVIEW:  Cholesterol and kidney function is good. To begin insulin as discussed. To call if new c/o of high or low sugars as instructed on summary sheet.

## 2021-02-02 NOTE — ASSESSMENT & PLAN NOTE
The pt has reported no chest pain,palpatations,edema,shortness of breath,syncope, or lightheadedness since their last visit. They have not required any prn meds for angina-like c/o. Last cardio visit was 1/19/2021. Last GXT was normal. Cardiology to order ECHO next yr(2022).

## 2021-02-03 ENCOUNTER — TELEPHONE (OUTPATIENT)
Dept: FAMILY MEDICINE CLINIC | Age: 76
End: 2021-02-03

## 2021-02-03 NOTE — TELEPHONE ENCOUNTER
I spoke with Marilee Bentley, she will fix the directions and fill the medications for the pt.
Pharmacy calling to get some clarification about the insulin aspart. How many units to take before breakfast and evening meals? Should he take the insulin before his one evening meal or all meals consumed in the evening? Dispense quantity is 5 pens or 5ML?
Detail Level: Detailed
Detail Level: Zone

## 2021-03-02 RX ORDER — ATORVASTATIN CALCIUM 20 MG/1
TABLET, FILM COATED ORAL
Qty: 90 TABLET | Refills: 3 | Status: SHIPPED | OUTPATIENT
Start: 2021-03-02 | End: 2022-02-23

## 2021-03-02 RX ORDER — SITAGLIPTIN 100 MG/1
TABLET, FILM COATED ORAL
Qty: 90 TABLET | Refills: 3 | Status: SHIPPED | OUTPATIENT
Start: 2021-03-02

## 2021-03-02 NOTE — TELEPHONE ENCOUNTER
Refill Request JANUVIA  100MG  TAB    Last Seen: 2/2/2021    Last Written: 12/30/20 90 tablets with 0 refills    Next Appointment:   Future Appointments   Date Time Provider Barbara Walter   5/4/2021 11:30 AM PAOLA Gonsales MD [facility]           Requested Prescriptions     Pending Prescriptions Disp Refills    JANUVIA 100 MG tablet [Pharmacy Med Name: JANUVIA  100MG  TAB] 90 tablet 3     Sig: TAKE 1 TABLET BY MOUTH  DAILY

## 2021-03-02 NOTE — TELEPHONE ENCOUNTER
Refill Request ATORVASTATIN  20MG  TAB    Last Seen: 2/2/2021    Last Written: 1/10/20 90 tablets with 2 refills    Next Appointment:   Future Appointments   Date Time Provider Barbara Walter   5/4/2021 11:30 AM PAOLA Montes MD [facility]           Requested Prescriptions     Pending Prescriptions Disp Refills    atorvastatin (LIPITOR) 20 MG tablet [Pharmacy Med Name: ATORVASTATIN  20MG  TAB] 90 tablet 3     Sig: TAKE 1 TABLET BY MOUTH  DAILY

## 2021-03-08 LAB — DIABETIC RETINOPATHY: NEGATIVE

## 2021-03-10 RX ORDER — PEN NEEDLE, DIABETIC 29 G X1/2"
NEEDLE, DISPOSABLE MISCELLANEOUS
Qty: 100 EACH | Refills: 0 | Status: SHIPPED | OUTPATIENT
Start: 2021-03-10 | End: 2021-05-17 | Stop reason: SDUPTHER

## 2021-05-13 ENCOUNTER — OFFICE VISIT (OUTPATIENT)
Dept: FAMILY MEDICINE CLINIC | Age: 76
End: 2021-05-13
Payer: MEDICARE

## 2021-05-13 VITALS
WEIGHT: 167 LBS | HEIGHT: 65 IN | DIASTOLIC BLOOD PRESSURE: 62 MMHG | TEMPERATURE: 97.6 F | HEART RATE: 60 BPM | OXYGEN SATURATION: 98 % | SYSTOLIC BLOOD PRESSURE: 160 MMHG | RESPIRATION RATE: 16 BRPM | BODY MASS INDEX: 27.82 KG/M2

## 2021-05-13 DIAGNOSIS — Z01.818 PREOP EXAMINATION: Primary | ICD-10-CM

## 2021-05-13 DIAGNOSIS — I49.5 SICK SINUS SYNDROME (HCC): ICD-10-CM

## 2021-05-13 DIAGNOSIS — Z79.4 CONTROLLED TYPE 2 DIABETES MELLITUS WITHOUT COMPLICATION, WITH LONG-TERM CURRENT USE OF INSULIN (HCC): ICD-10-CM

## 2021-05-13 DIAGNOSIS — I48.0 INTERMITTENT ATRIAL FIBRILLATION (HCC): ICD-10-CM

## 2021-05-13 DIAGNOSIS — H40.9 GLAUCOMA OF BOTH EYES, UNSPECIFIED GLAUCOMA TYPE: ICD-10-CM

## 2021-05-13 DIAGNOSIS — I25.10 ATHEROSCLEROSIS OF NATIVE CORONARY ARTERY OF NATIVE HEART WITHOUT ANGINA PECTORIS: ICD-10-CM

## 2021-05-13 DIAGNOSIS — E11.9 CONTROLLED TYPE 2 DIABETES MELLITUS WITHOUT COMPLICATION, WITH LONG-TERM CURRENT USE OF INSULIN (HCC): ICD-10-CM

## 2021-05-13 DIAGNOSIS — H26.9 CATARACT OF BOTH EYES, UNSPECIFIED CATARACT TYPE: ICD-10-CM

## 2021-05-13 PROCEDURE — 3051F HG A1C>EQUAL 7.0%<8.0%: CPT | Performed by: PHYSICIAN ASSISTANT

## 2021-05-13 PROCEDURE — 1123F ACP DISCUSS/DSCN MKR DOCD: CPT | Performed by: PHYSICIAN ASSISTANT

## 2021-05-13 PROCEDURE — 4040F PNEUMOC VAC/ADMIN/RCVD: CPT | Performed by: PHYSICIAN ASSISTANT

## 2021-05-13 PROCEDURE — G8427 DOCREV CUR MEDS BY ELIG CLIN: HCPCS | Performed by: PHYSICIAN ASSISTANT

## 2021-05-13 PROCEDURE — 1036F TOBACCO NON-USER: CPT | Performed by: PHYSICIAN ASSISTANT

## 2021-05-13 PROCEDURE — 99214 OFFICE O/P EST MOD 30 MIN: CPT | Performed by: PHYSICIAN ASSISTANT

## 2021-05-13 PROCEDURE — G8417 CALC BMI ABV UP PARAM F/U: HCPCS | Performed by: PHYSICIAN ASSISTANT

## 2021-05-13 NOTE — PATIENT INSTRUCTIONS
Aspirin has been stopped. Restart ASA after procedure. DO not take insuline's ( novolog and lantus) on day of procedure. Ok to take all other meds including amlodipine.

## 2021-05-13 NOTE — PROGRESS NOTES
tablet TAKE 1 TABLET BY MOUTH  DAILY 90 tablet 3    atorvastatin (LIPITOR) 20 MG tablet TAKE 1 TABLET BY MOUTH  DAILY 90 tablet 3    amLODIPine (NORVASC) 5 MG tablet Take 1 tablet by mouth daily (Patient taking differently: Take 5 mg by mouth 2 times daily ) 30 tablet 5    insulin aspart (NOVOLOG FLEXPEN) 100 UNIT/ML injection pen Before breakfast and evening meals (Patient taking differently: 5 Units Before breakfast and evening meals) 5 pen 3    insulin glargine (LANTUS SOLOSTAR) 100 UNIT/ML injection pen Before breakfast and evening meals (Patient taking differently: 10 Units Before breakfast and evening meals) 5 pen 5    Accu-Chek FastClix Lancets MISC Test two times daily for  diabetes 204 each 3    latanoprost (XALATAN) 0.005 % ophthalmic solution       dorzolamide-timolol (COSOPT) 22.3-6.8 MG/ML ophthalmic solution       brimonidine (ALPHAGAN) 0.2 % ophthalmic solution       glipiZIDE (GLUCOTROL) 5 MG tablet TAKE 1 TABLET BY MOUTH  DAILY 90 tablet 3    ketoconazole (NIZORAL) 2 % cream Apply to affected area(s) BID to feet, toes including web spaces, and ankles for 2 weeks 60 g 1    Accu-Chek FastClix Lancets MISC Test two times daily for  diabetes 204 each 3    Lancet Devices (ACCU-CHEK SOFTCLIX LANCET DEV) MISC tewst twice daily for diabetes e11.9 100 each 3    triamcinolone (KENALOG) 0.1 % ointment Apply to affected area twice daily for up to 2 weeks or until improved. 80 g 2    Lancets Misc. (ACCU-CHEK FASTCLIX LANCET) KIT Test twice daily for diabetes e11.9 1 kit 5    Insulin Syringe-Needle U-100 ([employer] INSULIN SYRINGE) 31G X 5/16\" 0.5 ML MISC 1 each by Does not apply route daily Use daily for insulin adminstration 100 each 3    Blood Glucose Monitoring Suppl (ACCU-CHEK BRITNEY PLUS) W/DEVICE KIT 1 each by Does not apply route 2 times daily.  TEST TWICE DAILY FOR DIABETES 1 kit 0    blood glucose test strips (ACCU-CHEK BRITNEY PLUS) strip TEST TWO TIMES DAILY FOR  DIABETES 200 strip 2    aspirin EC 81 MG EC tablet Take 1 tablet by mouth daily. No current facility-administered medications on file prior to visit. ALLERGIES:    Allergies   Allergen Reactions    Ace Inhibitors Other (See Comments)     angioedema    Ticlid [Ticlopidine Hydrochloride]      rash       PHYSICAL EXAM:    Vitals:    05/13/21 1003 05/13/21 1028   BP: (!) 142/60 (!) 160/62   Site:  Left Upper Arm   Position:  Sitting   Pulse: 60    Resp: 16    Temp: 97.6 °F (36.4 °C)    SpO2: 98%    Weight: 167 lb (75.8 kg)    Height: 5' 5\" (1.651 m)    Body mass index is 27.79 kg/m². Constitutional:  Awake, alert, cooperative, no apparent distress. Eyes:  Lids and lashes normal, PERRLA, EOMI, sclera clear, conjunctiva normal. cataracts. Head/ENT:  Normocephalic, atraumatic, sinuses nontender on palpation, TMs intact, ear canals clear, oral - not examined today. Neck:  Supple, no adenopathy, thyroid symmetric. Heart:  Regular rate and rhythm, normal S1 and S2, no S3 or S4, and no murmur noted  Lungs:  Clear to auscultation, no crackles or wheezing  Abdomen:  No skin changes, soft, non-distended,  normoactive bowel sounds, non-tender, no masses or hepatosplenomegaly. Extremities:  No clubbing, cyanosis, or edema. Moves all joints. Neurologic:  Awake, alert, oriented to name, place and time. Cranial nerves II-XII are grossly intact. Motor and sensory equal and intact bilateral.       ADDITIONAL DATA:  LABWORK:  No orders of the defined types were placed in this encounter. ASSESSMENT and PLAN:    Pre-Operative Medical Clearance Examination for  Phacoemulsification with IOL Implant   And trabeculectomy with Mitomycin- right eye. Harry De La [birth year]az 1945 64 y.o. male is medically satisfactory for surgery. Jasvir Sheikh was advised to restart ASA after procedure. DO not take insuline's ( novolog and lantus) on day of procedure.  Ok to take morning amlodipine and Januvia with small sip of water prior to procedure upon waking.              [address]" [place], [place] 05/13/21   11:24 AM

## 2021-05-17 RX ORDER — PEN NEEDLE, DIABETIC 29 G X1/2"
NEEDLE, DISPOSABLE MISCELLANEOUS
Qty: 200 EACH | Refills: 1 | Status: SHIPPED | OUTPATIENT
Start: 2021-05-17 | End: 2021-12-23

## 2021-05-17 RX ORDER — INSULIN GLARGINE 100 [IU]/ML
10 INJECTION, SOLUTION SUBCUTANEOUS 2 TIMES DAILY
Qty: 10 PEN | Refills: 1 | Status: SHIPPED | OUTPATIENT
Start: 2021-05-17 | End: 2022-04-06

## 2021-05-17 NOTE — TELEPHONE ENCOUNTER
Donnyhilda AvilaPooja [phone] (home)    is requesting refill(s) of medication Pens and needles for the Lantus   to preferred pharmacy [facility].       Last OV 02-02-21 (pertaining to medication)   Last refill 03-10-21 (per medication requested)  Next office visit scheduled or attempted Yes  Date 06-07-21  If No, reason made

## 2021-05-24 RX ORDER — AMLODIPINE BESYLATE 5 MG/1
5 TABLET ORAL DAILY
Qty: 90 TABLET | Refills: 1 | Status: SHIPPED | OUTPATIENT
Start: 2021-05-24 | End: 2021-12-08

## 2021-05-24 RX ORDER — METOPROLOL SUCCINATE 50 MG/1
50 TABLET, EXTENDED RELEASE ORAL DAILY
Qty: 90 TABLET | Refills: 1 | Status: SHIPPED | OUTPATIENT
Start: 2021-05-24 | End: 2021-11-01

## 2021-05-24 NOTE — TELEPHONE ENCOUNTER
Med was taken off at last appt   But pt stated  Did not stop taking but now takes once a day instead of twice a day asking for a   90 day  Takes amlodipine in am and metoprolol in the evening    but I show the metoprolol as discontinued and the amlodipine as  He was taking 2xs a day- which is correct?

## 2021-05-24 NOTE — TELEPHONE ENCOUNTER
Gayle Hammer [phone] (home)    is requesting refill(s) of medication metoprolol succinate (TOPROL XL) 50 MG  to preferred pharmacy [address], [address]     Last OV 2/2/21 (pertaining to medication)   Last refill 1/10/20 (per medication requested)  Next office visit scheduled or attempted Yes  Date 6/7/21  If No, reason made      Gayle Hammer [phone] (home)    is requesting refill(s) of medication amLODIPine (NORVASC) 5 MG tablet to preferred pharmacy [address], [address] [facility] 2/2/21 (pertaining to medication)   Last refill 2/2/21 (per medication requested)  Next office visit scheduled or attempted Yes  Date 6/7/21  If No, reason made.

## 2021-06-07 ENCOUNTER — OFFICE VISIT (OUTPATIENT)
Dept: FAMILY MEDICINE CLINIC | Age: 76
End: 2021-06-07
Payer: MEDICARE

## 2021-06-07 VITALS
OXYGEN SATURATION: 98 % | WEIGHT: 164 LBS | DIASTOLIC BLOOD PRESSURE: 72 MMHG | SYSTOLIC BLOOD PRESSURE: 132 MMHG | HEIGHT: 65 IN | HEART RATE: 60 BPM | RESPIRATION RATE: 16 BRPM | BODY MASS INDEX: 27.32 KG/M2

## 2021-06-07 DIAGNOSIS — G56.01 CARPAL TUNNEL SYNDROME OF RIGHT WRIST: ICD-10-CM

## 2021-06-07 DIAGNOSIS — I77.1 SUBCLAVIAN ARTERIAL STENOSIS (HCC): ICD-10-CM

## 2021-06-07 DIAGNOSIS — I65.22 CAROTID STENOSIS, LEFT: ICD-10-CM

## 2021-06-07 DIAGNOSIS — I25.10 ATHEROSCLEROSIS OF NATIVE CORONARY ARTERY OF NATIVE HEART WITHOUT ANGINA PECTORIS: ICD-10-CM

## 2021-06-07 DIAGNOSIS — E11.9 CONTROLLED TYPE 2 DIABETES MELLITUS WITHOUT COMPLICATION, WITH LONG-TERM CURRENT USE OF INSULIN (HCC): Primary | ICD-10-CM

## 2021-06-07 DIAGNOSIS — I10 ESSENTIAL HYPERTENSION: ICD-10-CM

## 2021-06-07 DIAGNOSIS — I49.5 SICK SINUS SYNDROME (HCC): ICD-10-CM

## 2021-06-07 DIAGNOSIS — Z79.4 CONTROLLED TYPE 2 DIABETES MELLITUS WITHOUT COMPLICATION, WITH LONG-TERM CURRENT USE OF INSULIN (HCC): Primary | ICD-10-CM

## 2021-06-07 DIAGNOSIS — R09.89 BILATERAL CAROTID BRUITS: ICD-10-CM

## 2021-06-07 DIAGNOSIS — E78.00 PURE HYPERCHOLESTEROLEMIA: ICD-10-CM

## 2021-06-07 LAB — HBA1C MFR BLD: 7.5 %

## 2021-06-07 PROCEDURE — G8417 CALC BMI ABV UP PARAM F/U: HCPCS | Performed by: INTERNAL MEDICINE

## 2021-06-07 PROCEDURE — 1036F TOBACCO NON-USER: CPT | Performed by: INTERNAL MEDICINE

## 2021-06-07 PROCEDURE — 99214 OFFICE O/P EST MOD 30 MIN: CPT | Performed by: INTERNAL MEDICINE

## 2021-06-07 PROCEDURE — G8427 DOCREV CUR MEDS BY ELIG CLIN: HCPCS | Performed by: INTERNAL MEDICINE

## 2021-06-07 PROCEDURE — 3051F HG A1C>EQUAL 7.0%<8.0%: CPT | Performed by: INTERNAL MEDICINE

## 2021-06-07 PROCEDURE — 1123F ACP DISCUSS/DSCN MKR DOCD: CPT | Performed by: INTERNAL MEDICINE

## 2021-06-07 PROCEDURE — 4040F PNEUMOC VAC/ADMIN/RCVD: CPT | Performed by: INTERNAL MEDICINE

## 2021-06-07 PROCEDURE — 83036 HEMOGLOBIN GLYCOSYLATED A1C: CPT | Performed by: INTERNAL MEDICINE

## 2021-06-07 ASSESSMENT — ENCOUNTER SYMPTOMS
RESPIRATORY NEGATIVE: 1
GASTROINTESTINAL NEGATIVE: 1
ALLERGIC/IMMUNOLOGIC NEGATIVE: 1

## 2021-06-07 NOTE — ASSESSMENT & PLAN NOTE
Sugars are IMPROVING on Lantus 10 u and Novalog 5 u qhc and HbA1c is up to 7.5%, diet is stable, weight is stable, no reported neuropathy, no change in vision, no claudication, no foot ulcers, no new skin lesions. Not medications as Rx. No c/o with meds. Last eye exam 4/21/2021. Recent OD cataract surgery.

## 2021-06-07 NOTE — ASSESSMENT & PLAN NOTE
No new c/o. Recent device check was good. Possible battery life ~0.5-1  yrs+. No new c/o.  Testing soon

## 2021-06-07 NOTE — PATIENT INSTRUCTIONS
Sick Sinus Syndrome (Hcc). Pacer check soon. Carpal Tunnel Syndrome of Right Wrist. Brace if needed. Call if worsens. Carotid Stenosis, Left. Due for rechx. Doppler ordered. Subclavian Arterial Stenosis (Hcc). No new c/o. No change in exam.     Essential Hypertension. Continue present meds. Home BP checks. Call if>140/90. Improve diet. Avoid caffeine and salt. Call if new c/o w/ meds. Atherosclerosis of Native Coronary Artery of Native Heart Without Angina Pectoris. To Cardiology as scheduled. Call if new c/o. ECHO due 2022. Controlled Type 2 Diabetes Mellitus Without Complication, With Long-Term Current Use of Insulin (Hcc). Will do labs and adjust meds after results are evaluated. To continue to improve diet and lose weight. To follow Diabetic diet. If needs further help w/ Diabetic diet to call and will refer back to dietician. Home sugar checks. To call if sudden change up or down in sugars. To do regular foot checks. Call if new problems. Pure Hypercholesterolemia. To continue to improve diet and exercise as tolerated. Continue med. Call if new c/o. rechx labs next visit.

## 2021-06-07 NOTE — PROGRESS NOTES
Subjective:      Patient ID: Donny Patton is a 64 y.o. male. HPI  Controlled type 2 diabetes mellitus without complication, with long-term current use of insulin (HCC)  Sugars are IMPROVING on Lantus 10 u and Novalog 5 u qhc and HbA1c is up to 7.5%, diet is stable, weight is stable, no reported neuropathy, no change in vision, no claudication, no foot ulcers, no new skin lesions. Not medications as Rx. No c/o with meds. Last eye exam 4/21/2021. Recent OD cataract surgery. Essential hypertension  No change in meds, no c/o with meds, no chest pain, SOB, palpatations, or syncope. Home bp was good w/ meds. Subclavian arterial stenosis (HCC)  No recent c/o. Has seen Dr Bhavesh Barreto in past.     Sick sinus syndrome ([address]  No new c/o. Recent device check was good. Possible battery life ~0.5-1  yrs+. No new c/o. Testing soon    Pure hypercholesterolemia  Stable diet and good wt. No c/o w/ meds. Carotid stenosis, left  S/p L Carotid endarterectomy and doing well. rechx 6/30/2020 was stable (<50% L and 50-65% R)    Atherosclerosis of native coronary artery of native heart without angina pectoris  The pt has reported no chest pain,palpatations,edema,shortness of breath,syncope, or lightheadedness since their last visit. They have not required any prn meds for angina-like c/o. Last cardio visit was 1/19/2021. Last GXT was normal. Cardiology to order ECHO next yr(2022). Carpal tunnel syndrome of right wrist   Mild c/o unchanged. Review of Systems   Constitutional: Positive for fatigue. Respiratory: Negative. Cardiovascular: Negative. Gastrointestinal: Negative. Endocrine: Negative. Musculoskeletal: Positive for arthralgias. Skin: Negative. Allergic/Immunologic: Negative. Neurological: Positive for numbness. Psychiatric/Behavioral: Negative.       Vitals:    06/07/21 1132 06/07/21 1202   BP: 122/68 132/72   Pulse: 60    Resp: 16    SpO2: 98%    Weight: 164 lb (74.4 kg)    Height: 5' 5\" present. There is no hernia in the ventral area. Musculoskeletal:         General: No swelling, tenderness, deformity or signs of injury. Cervical back: Full passive range of motion without pain, normal range of motion and neck supple. No rigidity or tenderness. Right lower leg: No edema. Left lower leg: No edema. Lymphadenopathy:      Cervical: No cervical adenopathy. Skin:     General: Skin is warm and dry. Capillary Refill: Capillary refill takes less than 2 seconds. Coloration: Skin is not jaundiced or pale. Findings: No abrasion, bruising, erythema, lesion or rash. Nails: There is no clubbing. Neurological:      General: No focal deficit present. Mental Status: He is alert and oriented to person, place, and time. Mental status is at baseline. He is not disoriented. Cranial Nerves: No cranial nerve deficit. Sensory: No sensory deficit. Motor: No weakness, tremor, atrophy or abnormal muscle tone. Coordination: Coordination normal.      Gait: Gait normal.      Deep Tendon Reflexes: Reflexes normal.      Comments: Diabetic foot exam was normal with intact light touch and vibratory senses. Psychiatric:         Speech: Speech normal.         Behavior: Behavior normal.         Thought Content: Thought content normal.         Judgment: Judgment normal.         Assessment / Plan:     Sick Sinus Syndrome (Hcc). Pacer check soon. Carpal Tunnel Syndrome of Right Wrist. Brace if needed. Call if worsens. Carotid Stenosis, Left. Due for rechx. Doppler ordered. Subclavian Arterial Stenosis (Hcc). No new c/o. No change in exam.     Essential Hypertension. Continue present meds. Home BP checks. Call if>140/90. Improve diet. Avoid caffeine and salt. Call if new c/o w/ meds. Atherosclerosis of Native Coronary Artery of Native Heart Without Angina Pectoris. To Cardiology as scheduled. Call if new c/o. ECHO due 2022.      Controlled Type 2 Diabetes Mellitus Without Complication, With Long-Term Current Use of Insulin (Hcc). Will do labs and adjust meds after results are evaluated. To continue to improve diet and lose weight. To follow Diabetic diet. If needs further help w/ Diabetic diet to call and will refer back to dietician. Home sugar checks. To call if sudden change up or down in sugars. To do regular foot checks. Call if new problems. Pure Hypercholesterolemia. To continue to improve diet and exercise as tolerated. Continue med. Call if new c/o. rechx labs next visit.

## 2021-07-06 ENCOUNTER — TELEPHONE (OUTPATIENT)
Dept: FAMILY MEDICINE CLINIC | Age: 76
End: 2021-07-06

## 2021-07-06 DIAGNOSIS — R09.89 BILATERAL CAROTID BRUITS: Primary | ICD-10-CM

## 2021-07-06 DIAGNOSIS — I65.22 CAROTID STENOSIS, LEFT: ICD-10-CM

## 2021-07-09 ENCOUNTER — HOSPITAL ENCOUNTER (OUTPATIENT)
Dept: VASCULAR LAB | Age: 76
Discharge: HOME | End: 2021-07-09
Payer: MEDICARE

## 2021-07-09 DIAGNOSIS — I65.22 CAROTID STENOSIS, LEFT: ICD-10-CM

## 2021-07-09 DIAGNOSIS — R09.89 BILATERAL CAROTID BRUITS: ICD-10-CM

## 2021-07-09 PROCEDURE — 93880 EXTRACRANIAL BILAT STUDY: CPT

## 2021-07-09 NOTE — RESULT ENCOUNTER NOTE
Carotid US show 50-69% blockage of R ICA. This is unchanged for 1 yr ago. Continue to improve diet and control Sugar, cholesterol, and BP. Rechx Carotid US in 1 yr.

## 2021-07-26 ENCOUNTER — OFFICE VISIT (OUTPATIENT)
Dept: FAMILY MEDICINE CLINIC | Age: 76
End: 2021-07-26
Payer: MEDICARE

## 2021-07-26 VITALS
SYSTOLIC BLOOD PRESSURE: 126 MMHG | DIASTOLIC BLOOD PRESSURE: 86 MMHG | RESPIRATION RATE: 16 BRPM | WEIGHT: 166.6 LBS | HEIGHT: 65 IN | OXYGEN SATURATION: 98 % | BODY MASS INDEX: 27.76 KG/M2 | HEART RATE: 108 BPM

## 2021-07-26 DIAGNOSIS — I49.9 IRREGULAR HEART BEAT: Primary | ICD-10-CM

## 2021-07-26 PROCEDURE — 93000 ELECTROCARDIOGRAM COMPLETE: CPT | Performed by: INTERNAL MEDICINE

## 2021-07-26 PROCEDURE — 1123F ACP DISCUSS/DSCN MKR DOCD: CPT | Performed by: INTERNAL MEDICINE

## 2021-07-26 PROCEDURE — G8427 DOCREV CUR MEDS BY ELIG CLIN: HCPCS | Performed by: INTERNAL MEDICINE

## 2021-07-26 PROCEDURE — 4040F PNEUMOC VAC/ADMIN/RCVD: CPT | Performed by: INTERNAL MEDICINE

## 2021-07-26 PROCEDURE — 1036F TOBACCO NON-USER: CPT | Performed by: INTERNAL MEDICINE

## 2021-07-26 PROCEDURE — 99214 OFFICE O/P EST MOD 30 MIN: CPT | Performed by: INTERNAL MEDICINE

## 2021-07-26 PROCEDURE — G8417 CALC BMI ABV UP PARAM F/U: HCPCS | Performed by: INTERNAL MEDICINE

## 2021-07-26 NOTE — PROGRESS NOTES
Subjective:      Conception Art is a 64 y.o. male who presents to the office today for a preoperative consultation at the request of surgeon Roxana Lemon who plans on performing OS Cataract  on August 3rd. This consultation is requested for the specific conditions prompting preoperative evaluation (i.e. because of potential affect on operative risk): HTN, CAD,DM, Hyperlipidemia, . Planned anesthesia is Regional and IV sedation. The patient has the following known anesthesia issues: none  Patient has a bleeding risk of : no recent abnormal bleeding, no remote history of abnormal bleeding, use of Ca-channel blockers (see med list)  Patient does not have objection to receiving blood products if needed.   Past Medical History:   Diagnosis Date    CAD (coronary artery disease)     DVT     Hyperlipidemia     Hypertension     Stenosis     subclavical    Type II or unspecified type diabetes mellitus without mention of complication, not stated as uncontrolled      Patient Active Problem List    Diagnosis Date Noted    Sick sinus syndrome ([address] 12/11/2020    Carpal tunnel syndrome of right wrist 06/02/2020    Carotid stenosis, left 09/10/2018    Subclavian arterial stenosis ([address] 04/18/2013    Essential hypertension 04/18/2013    Atherosclerosis of native coronary artery of native heart without angina pectoris 06/29/2010    Controlled type 2 diabetes mellitus without complication, with long-term current use of insulin ([address] 06/29/2010    Pure hypercholesterolemia 06/29/2010    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    Left carotid artery stenosis     Venous stasis 08/30/2018    ETD (Eustachian tube dysfunction), left 11/27/2017    Chronic serous otitis media of right ear 10/24/2017    Hoarseness of voice 07/30/2017    Post-nasal drip 07/30/2017    Subjective tinnitus 07/30/2017    Acute serous otitis media, right ear 07/30/2017    Mixed conductive and sensorineural hearing loss of right ear with restricted hearing of left ear 07/30/2017    Sensorineural hearing loss (SNHL) of left ear with restricted hearing of right ear 07/30/2017    Heart block 05/19/2016    Fungal nail infection 10/01/2015    Adenomatous colon polyp 06/04/2015    [facility] 04/18/2013    Rash/skin eruption 07/10/2012    Fatigue 05/09/2012    Intermittent atrial fibrillation ([facility] 03/13/2012    Arthritis of wrist, right, degenerative 07/27/2011    Arthropathy 06/29/2010     Past Surgical History:   Procedure Laterality Date    CARDIAC DEFIBRILLATOR PLACEMENT      CORONARY ARTERY BYPASS GRAFT  2004    CT Emaline Pucker INCIS Left 9/10/2018    LEFT CAROTID ENDARTERECTOMY performed by Delia Lira MD at [address] [place] [address]     Family History   Problem Relation Age of Onset    Coronary Art Dis Mother     Hypertension Mother     Coronary Art Dis Father     Hypertension Father      Social History     Socioeconomic History    Marital status:      Spouse name: Not on file    Number of children: Not on file    Years of education: Not on file    Highest education level: Not on file   Occupational History    Not on file   Tobacco Use    Smoking status: Never Smoker    Smokeless tobacco: Never Used   Vaping Use    Vaping Use: Never used   Substance and Sexual Activity    Alcohol use: No    Drug use: No    Sexual activity: Not on file   Other Topics Concern    Not on file   Social History Narrative    Not on file     Social Determinants of Health     Financial Resource Strain:     Difficulty of Paying Living Expenses:    Food Insecurity:     Worried About Running Out of Food in the Last Year:     Ran Out of Food in the Last Year:    Transportation Needs:     Lack of Transportation (Medical):      Lack of Transportation (Non-Medical):    Physical Activity:     Days of Exercise per Week:     Minutes of Exercise per Session:    Stress:     Feeling of Stress :    Social Connections:     Frequency of Communication with Friends and Family:     Frequency of Social Gatherings with Friends and Family:     Attends Holiness Services:     Active Member of Clubs or Organizations:     Attends Club or Organization Meetings:     Marital Status:    Intimate Partner Violence:     Fear of Current or Ex-Partner:     Emotionally Abused:     Physically Abused:     Sexually Abused:      Current Outpatient Medications   Medication Sig Dispense Refill    metoprolol succinate (TOPROL XL) 50 MG extended release tablet Take 1 tablet by mouth daily 90 tablet 1    amLODIPine (NORVASC) 5 MG tablet Take 1 tablet by mouth daily 90 tablet 1    Insulin Pen Needle ([employer] MINI PEN NEEDLES) 31G X 6 MM MISC Use 2x  daily for insulin injection 200 each 1    insulin glargine (LANTUS SOLOSTAR) 100 UNIT/ML injection pen Inject 10 Units into the skin 2 times daily Before breakfast and evening meals 10 pen 1    JANUVIA 100 MG tablet TAKE 1 TABLET BY MOUTH  DAILY 90 tablet 3    atorvastatin (LIPITOR) 20 MG tablet TAKE 1 TABLET BY MOUTH  DAILY 90 tablet 3    insulin aspart (NOVOLOG FLEXPEN) 100 UNIT/ML injection pen Before breakfast and evening meals (Patient taking differently: 5 Units Before breakfast and evening meals) 5 pen 3    Accu-Chek FastClix Lancets MISC Test two times daily for  diabetes 204 each 3    blood glucose test strips (ACCU-CHEK BRITNEY PLUS) strip TEST TWO TIMES DAILY FOR  DIABETES 200 strip 2    latanoprost (XALATAN) 0.005 % ophthalmic solution       dorzolamide-timolol (COSOPT) 22.3-6.8 MG/ML ophthalmic solution       brimonidine (ALPHAGAN) 0.2 % ophthalmic solution  (Patient not taking: Reported on 6/7/2021)      glipiZIDE (GLUCOTROL) 5 MG tablet TAKE 1 TABLET BY MOUTH  DAILY 90 tablet 3    ketoconazole (NIZORAL) 2 % cream Apply to affected area(s) BID to feet, toes including web spaces, and ankles for 2 weeks 60 g 1    Accu-Chek FastClix Lancets MISC Test two times daily for  diabetes 204 each 3    Lancet Devices (ACCU-CHEK SOFTCLIX LANCET DEV) MISC tewst twice daily for diabetes e11.9 100 each 3    triamcinolone (KENALOG) 0.1 % ointment Apply to affected area twice daily for up to 2 weeks or until improved. 80 g 2    Lancets Misc. (ACCU-CHEK FASTCLIX LANCET) KIT Test twice daily for diabetes e11.9 1 kit 5    Insulin Syringe-Needle U-100 ([employer] INSULIN SYRINGE) 31G X 5/16\" 0.5 ML MISC 1 each by Does not apply route daily Use daily for insulin adminstration 100 each 3    Blood Glucose Monitoring Suppl (ACCU-CHEK BRITNEY PLUS) W/DEVICE KIT 1 each by Does not apply route 2 times daily. TEST TWICE DAILY FOR DIABETES 1 kit 0    aspirin EC 81 MG EC tablet Take 1 tablet by mouth daily. No current facility-administered medications for this visit.      Allergies   Allergen Reactions    Ace Inhibitors Other (See Comments)     angioedema    Ticlid [Ticlopidine Hydrochloride]      rash     Review of Systems  A comprehensive review of systems was negative except for: Eyes: positive for cataracts      Objective:      /86   Pulse 108   Resp 16   Ht 5' 5\" (1.651 m)   Wt 166 lb 9.6 oz (75.6 kg)   SpO2 98%   BMI 27.72 kg/m²     General Appearance:  Alert, cooperative, no distress, appears stated age   Head:  Normocephalic, without obvious abnormality, atraumatic   Eyes:  PERRL, conjunctiva/corneas clear, EOM's intact, fundi benign, both eyes   Ears:  Normal TM's and external ear canals, both ears   Nose: Nares normal, septum midline, mucosa normal, no drainage or sinus tenderness   Throat: Lips, mucosa, and tongue normal; teeth and gums normal   Neck: Supple, symmetrical, trachea midline, no adenopathy, thyroid: not enlarged, symmetric, no tenderness/mass/nodules, no carotid bruit or JVD   Back:   Symmetric, no curvature, ROM normal, no CVA tenderness   Lungs:   Clear to auscultation bilaterally, respirations unlabored Chest Wall:  No tenderness or deformity   Heart:  Regular rate and rhythm, S1, S2 normal, no murmur, rub or gallop   Abdomen:   Soft, non-tender, bowel sounds active all four quadrants,  no masses, no organomegaly   Genitalia:  Normal male   Rectal:  NE   Extremities: Extremities normal, atraumatic, no cyanosis or edema   Pulses: 2+ and symmetric   Skin: Skin color, texture, turgor normal, no rashes or lesions   Lymph nodes: Cervical, supraclavicular, and axillary nodes normal   Neurologic: Normal         Predictors of intubation difficulty:   Morbid obesity? no   Anatomically abnormal facies? no   Prominent incisors? no   Receding mandible? no   Short, thick neck? no   Neck range of motion: normal    Dentition: No chipped, loose, or missing teeth. Cardiographics  ECG: Paced rhythm mildly tachycardic w/ no signs of ischemia. Lab Review   Lab Results   Component Value Date     02/02/2021    K 4.4 02/02/2021     02/02/2021    CO2 25 02/02/2021    BUN 22 02/02/2021    CREATININE 1.1 02/02/2021    GLUCOSE 138 02/02/2021    GLUCOSE 136 07/21/2011    CALCIUM 9.7 02/02/2021     Lab Results   Component Value Date    WBC 6.3 08/06/2020    HGB 14.4 08/06/2020    HCT 44.5 08/06/2020    MCV 86.6 08/06/2020     08/06/2020     Lab Results   Component Value Date    CHOL 126 02/02/2021    TRIG 92 02/02/2021    HDL 47 02/02/2021    HDL 44 03/14/2012         Assessment:        64 y.o. male with planned surgery as above. Known risk factors for perioperative complications: Coronary disease  Diabetes mellitus    Difficulty with intubation is not anticipated. Plan:      1. Preoperative workup as follows : NONE  2. Change in medication regimen before surgery: discontinue NSAIDs or aspirin for 7d before surgery  3. Prophylaxis for cardiac events with perioperative beta-blockers: on Metoprolol  4. Invasive hemodynamic monitoring perioperatively: not indicated  5.  Deep vein thrombosis prophylaxis postoperatively:not indicated. 6. Surveillance for postoperative MI with ECG immediately postoperatively and on postoperative days 1 and 2 AND troponin levels 24 hours postoperatively and on day 4 or hospital discharge (whichever comes first): not indicated  7. Other measures: Cardiology to check Pacer.   CLEARED FOR OS CATARACT SURGERY

## 2021-07-28 ENCOUNTER — TELEPHONE (OUTPATIENT)
Dept: FAMILY MEDICINE CLINIC | Age: 76
End: 2021-07-28

## 2021-07-28 DIAGNOSIS — Z79.4 CONTROLLED TYPE 2 DIABETES MELLITUS WITHOUT COMPLICATION, WITH LONG-TERM CURRENT USE OF INSULIN (HCC): ICD-10-CM

## 2021-07-28 DIAGNOSIS — E11.9 CONTROLLED TYPE 2 DIABETES MELLITUS WITHOUT COMPLICATION, WITH LONG-TERM CURRENT USE OF INSULIN (HCC): ICD-10-CM

## 2021-07-28 RX ORDER — BLOOD SUGAR DIAGNOSTIC
STRIP MISCELLANEOUS
Qty: 200 STRIP | Refills: 3 | Status: SHIPPED | OUTPATIENT
Start: 2021-07-28 | End: 2022-09-08 | Stop reason: SDUPTHER

## 2021-07-28 NOTE — TELEPHONE ENCOUNTER
Jasvir Sheikh is requesting refill(s)   Last OV 06/07/2021 (pertaining to medication)  LR 01/21/2021 (per medication requested)  Next office visit scheduled or attempted 09/07/221

## 2021-09-10 ENCOUNTER — OFFICE VISIT (OUTPATIENT)
Dept: FAMILY MEDICINE CLINIC | Age: 76
End: 2021-09-10
Payer: MEDICARE

## 2021-09-10 VITALS
DIASTOLIC BLOOD PRESSURE: 66 MMHG | HEIGHT: 65 IN | BODY MASS INDEX: 27.66 KG/M2 | OXYGEN SATURATION: 98 % | HEART RATE: 60 BPM | RESPIRATION RATE: 16 BRPM | SYSTOLIC BLOOD PRESSURE: 132 MMHG | WEIGHT: 166 LBS

## 2021-09-10 DIAGNOSIS — Z79.4 CONTROLLED TYPE 2 DIABETES MELLITUS WITHOUT COMPLICATION, WITH LONG-TERM CURRENT USE OF INSULIN (HCC): ICD-10-CM

## 2021-09-10 DIAGNOSIS — I49.5 SICK SINUS SYNDROME (HCC): ICD-10-CM

## 2021-09-10 DIAGNOSIS — E11.9 CONTROLLED TYPE 2 DIABETES MELLITUS WITHOUT COMPLICATION, WITH LONG-TERM CURRENT USE OF INSULIN (HCC): ICD-10-CM

## 2021-09-10 DIAGNOSIS — E78.00 PURE HYPERCHOLESTEROLEMIA: ICD-10-CM

## 2021-09-10 DIAGNOSIS — I77.1 SUBCLAVIAN ARTERIAL STENOSIS (HCC): ICD-10-CM

## 2021-09-10 DIAGNOSIS — I65.23 BILATERAL CAROTID ARTERY STENOSIS: ICD-10-CM

## 2021-09-10 DIAGNOSIS — I48.0 INTERMITTENT ATRIAL FIBRILLATION (HCC): ICD-10-CM

## 2021-09-10 DIAGNOSIS — I25.10 ATHEROSCLEROSIS OF NATIVE CORONARY ARTERY OF NATIVE HEART WITHOUT ANGINA PECTORIS: ICD-10-CM

## 2021-09-10 PROCEDURE — 3051F HG A1C>EQUAL 7.0%<8.0%: CPT | Performed by: INTERNAL MEDICINE

## 2021-09-10 PROCEDURE — 36415 COLL VENOUS BLD VENIPUNCTURE: CPT | Performed by: INTERNAL MEDICINE

## 2021-09-10 PROCEDURE — 3288F FALL RISK ASSESSMENT DOCD: CPT | Performed by: INTERNAL MEDICINE

## 2021-09-10 PROCEDURE — G8417 CALC BMI ABV UP PARAM F/U: HCPCS | Performed by: INTERNAL MEDICINE

## 2021-09-10 PROCEDURE — 1123F ACP DISCUSS/DSCN MKR DOCD: CPT | Performed by: INTERNAL MEDICINE

## 2021-09-10 PROCEDURE — 4040F PNEUMOC VAC/ADMIN/RCVD: CPT | Performed by: INTERNAL MEDICINE

## 2021-09-10 PROCEDURE — G8427 DOCREV CUR MEDS BY ELIG CLIN: HCPCS | Performed by: INTERNAL MEDICINE

## 2021-09-10 PROCEDURE — 99214 OFFICE O/P EST MOD 30 MIN: CPT | Performed by: INTERNAL MEDICINE

## 2021-09-10 PROCEDURE — 1036F TOBACCO NON-USER: CPT | Performed by: INTERNAL MEDICINE

## 2021-09-10 ASSESSMENT — PATIENT HEALTH QUESTIONNAIRE - PHQ9
2. FEELING DOWN, DEPRESSED OR HOPELESS: 0
SUM OF ALL RESPONSES TO PHQ9 QUESTIONS 1 & 2: 0
1. LITTLE INTEREST OR PLEASURE IN DOING THINGS: 0
SUM OF ALL RESPONSES TO PHQ QUESTIONS 1-9: 0

## 2021-09-10 ASSESSMENT — ENCOUNTER SYMPTOMS
RESPIRATORY NEGATIVE: 1
GASTROINTESTINAL NEGATIVE: 1

## 2021-09-10 NOTE — ASSESSMENT & PLAN NOTE
Sugars are IMPROVING on Lantus 10 u and Novalog 5 u qhc and HbA1c is up to 7.5%, diet is stable, weight is stable, no reported neuropathy, no change in vision, no claudication, no foot ulcers, no new skin lesions. Not medications as Rx. No c/o with meds. Last eye exam 4/21/2021. Recent OU cataract surgeries. Had secondary cataract on OD tx w/ laser.

## 2021-09-10 NOTE — ASSESSMENT & PLAN NOTE
No new c/o. Recent device check was good. Possible battery life ~0.5-1  yrs+. No new c/o. Testing recently.

## 2021-09-10 NOTE — PATIENT INSTRUCTIONS
A/P:     Sick Sinus Syndrome (Hcc). Due soon for [facility]. Subclavian Arterial Stenosis (Hcc). To vascular as scheduled. Bilateral Carotid Artery Disease (Hcc). To vascular as scheduled. Intermittent Atrial Fibrillation (Hcc). S/p Ablation and doing great. NSR today. Atherosclerosis of Native Coronary Artery of Native Heart Without Angina Pectoris. To cardiology as scheduled. Continue present meds and call if new c/o. Controlled Type 2 Diabetes Mellitus Without Complication, With Long-Term Current Use of Insulin (Hcc). Will do labs and adjust meds after results are evaluated. To continue to improve diet and lose weight. To follow Diabetic diet. If needs further help w/ Diabetic diet to call and will refer back to dietician. Home sugar checks. To call if sudden change up or down in sugars. To do regular foot checks. Call if new problems. Pure Hypercholesterolemia. To improve diet and exercise. Call if new c/o. Continue meds. Will do labs and call if new changes.                  Kelly Martinez MD

## 2021-09-10 NOTE — PROGRESS NOTES
Rebecca Nicole presents today for   Chief Complaint   Patient presents with    Diabetes     3mnth f.u        Intermittent atrial fibrillation ([facility] [address]   Had episode of fib/flutter. Had Ablation by Dr Jose Lay 8/2021. Now NSR w/ no new c/o. Controlled type 2 diabetes mellitus without complication, with long-term current use of insulin (HCC)  Sugars are IMPROVING on Lantus 10 u and Novalog 5 u qhc and HbA1c is up to 7.5%, diet is stable, weight is stable, no reported neuropathy, no change in vision, no claudication, no foot ulcers, no new skin lesions. Not medications as Rx. No c/o with meds. Last eye exam 4/21/2021. Recent OU cataract surgeries. Had secondary cataract on OD tx w/ laser. Atherosclerosis of native coronary artery of native heart without angina pectoris  The pt has reported no chest pain,palpatations,edema,shortness of breath,syncope, or lightheadedness since their last visit. They have not required any prn meds for angina-like c/o. Last cardio visit was 8/2021. Last GXT was normal. Cardiology to order ECHO next yr(2022). Recent cath and ablation and doing well. Subclavian arterial stenosis (HCC)  No recent c/o. Has seen Dr Janine Guerra in past.     Sick sinus syndrome ([facility] [address]  No new c/o. Recent device check was good. Possible battery life ~0.5-1  yrs+. No new c/o. Testing recently. Bilateral carotid artery disease (HCC)  S/p L Carotid endarterectomy and doing well. rechx 6/30/2020 was stable (<50% L and 50-65% R)    Pure hypercholesterolemia  Stable diet and good wt. No c/o w/ meds. Review of Systems   Constitutional: Positive for fatigue. HENT: Negative. Respiratory: Negative. Cardiovascular: Positive for palpitations. Gastrointestinal: Negative. Endocrine: Negative. Genitourinary: Negative. Musculoskeletal: Positive for arthralgias and myalgias. Neurological: Positive for weakness. Psychiatric/Behavioral: Negative.          Vitals:    09/10/21 1329   BP: 122/60   Pulse: sounds: Normal heart sounds. No murmur heard. No friction rub. No gallop. Pulmonary:      Effort: Pulmonary effort is normal. No tachypnea, bradypnea, accessory muscle usage or respiratory distress. Breath sounds: Normal breath sounds. No stridor. No decreased breath sounds, wheezing, rhonchi or rales. Chest:      Chest wall: No tenderness. Abdominal:      General: Bowel sounds are normal. There is no distension or abdominal bruit. Palpations: Abdomen is soft. There is no shifting dullness, fluid wave, mass or pulsatile mass. Tenderness: There is no abdominal tenderness. There is no right CVA tenderness, left CVA tenderness, guarding or rebound. Hernia: No hernia is present. There is no hernia in the ventral area or left inguinal area. Genitourinary:     Penis: No tenderness. Testes: Cremasteric reflex is present. Musculoskeletal:         General: No swelling, tenderness, deformity or signs of injury. Normal range of motion. Cervical back: Full passive range of motion without pain, normal range of motion and neck supple. No rigidity or tenderness. Right lower leg: No edema. Left lower leg: No edema. Lymphadenopathy:      Head:      Right side of head: No submental, submandibular, tonsillar, preauricular, posterior auricular or occipital adenopathy. Left side of head: No submental, submandibular, tonsillar, preauricular, posterior auricular or occipital adenopathy. Cervical: No cervical adenopathy. Upper Body:      Right upper body: No supraclavicular or epitrochlear adenopathy. Left upper body: No supraclavicular or epitrochlear adenopathy. Skin:     General: Skin is warm and dry. Capillary Refill: Capillary refill takes less than 2 seconds. Coloration: Skin is not jaundiced or pale. Findings: No abrasion, bruising, erythema, lesion or rash. Nails: There is no clubbing.    Neurological:      General: No focal deficit present. Mental Status: He is alert and oriented to person, place, and time. Mental status is at baseline. He is not disoriented. Cranial Nerves: No cranial nerve deficit. Sensory: No sensory deficit. Motor: No weakness, tremor, atrophy, abnormal muscle tone or seizure activity. Coordination: Coordination normal.      Gait: Gait normal.      Deep Tendon Reflexes: Reflexes are normal and symmetric. Reflexes normal. Babinski sign absent on the right side. Babinski sign absent on the left side. Reflex Scores:       Tricep reflexes are 2+ on the right side and 2+ on the left side. Bicep reflexes are 2+ on the right side and 2+ on the left side. Brachioradialis reflexes are 2+ on the right side and 2+ on the left side. Patellar reflexes are 2+ on the right side and 2+ on the left side. Achilles reflexes are 2+ on the right side and 2+ on the left side. Psychiatric:         Mood and Affect: Mood normal.         Speech: Speech normal.         Behavior: Behavior normal.         Thought Content: Thought content normal.         Judgment: Judgment normal.          A/P:     Sick Sinus Syndrome (Hcc). Due soon for [facility]. Subclavian Arterial Stenosis (Hcc). To vascular as scheduled. Bilateral Carotid Artery Disease (Hcc). To vascular as scheduled. Intermittent Atrial Fibrillation (Hcc). S/p Ablation and doing great. NSR today. Atherosclerosis of Native Coronary Artery of Native Heart Without Angina Pectoris. To cardiology as scheduled. Continue present meds and call if new c/o. Controlled Type 2 Diabetes Mellitus Without Complication, With Long-Term Current Use of Insulin (Hcc). Will do labs and adjust meds after results are evaluated. To continue to improve diet and lose weight. To follow Diabetic diet. If needs further help w/ Diabetic diet to call and will refer back to dietician. Home sugar checks.  To call if sudden change up or down in sugars. To do regular foot checks. Call if new problems. Pure Hypercholesterolemia. To improve diet and exercise. Call if new c/o. Continue meds. Will do labs and call if new changes.                  Shelly Montano MD

## 2021-09-10 NOTE — ASSESSMENT & PLAN NOTE
The pt has reported no chest pain,palpatations,edema,shortness of breath,syncope, or lightheadedness since their last visit. They have not required any prn meds for angina-like c/o. Last cardio visit was 8/2021. Last GXT was normal. Cardiology to order ECHO next yr(2022). Recent cath and ablation and doing well.

## 2021-09-11 LAB
ALBUMIN SERPL-MCNC: 4.3 G/DL (ref 3.4–5)
ALP BLD-CCNC: 59 U/L (ref 40–129)
ALT SERPL-CCNC: 20 U/L (ref 10–40)
ANION GAP SERPL CALCULATED.3IONS-SCNC: 12 MMOL/L (ref 3–16)
AST SERPL-CCNC: 26 U/L (ref 15–37)
BILIRUB SERPL-MCNC: 0.4 MG/DL (ref 0–1)
BILIRUBIN DIRECT: <0.2 MG/DL (ref 0–0.3)
BILIRUBIN, INDIRECT: NORMAL MG/DL (ref 0–1)
BUN BLDV-MCNC: 26 MG/DL (ref 7–20)
CALCIUM SERPL-MCNC: 9.8 MG/DL (ref 8.3–10.6)
CHLORIDE BLD-SCNC: 99 MMOL/L (ref 99–110)
CHOLESTEROL, TOTAL: 130 MG/DL (ref 0–199)
CO2: 25 MMOL/L (ref 21–32)
CREAT SERPL-MCNC: 0.9 MG/DL (ref 0.8–1.3)
ESTIMATED AVERAGE GLUCOSE: 162.8 MG/DL
GFR AFRICAN AMERICAN: >60
GFR NON-AFRICAN AMERICAN: >60
GLUCOSE BLD-MCNC: 121 MG/DL (ref 70–99)
HBA1C MFR BLD: 7.3 %
HDLC SERPL-MCNC: 48 MG/DL (ref 40–60)
LDL CHOLESTEROL CALCULATED: 61 MG/DL
POTASSIUM SERPL-SCNC: 4.5 MMOL/L (ref 3.5–5.1)
SODIUM BLD-SCNC: 136 MMOL/L (ref 136–145)
TOTAL PROTEIN: 7.2 G/DL (ref 6.4–8.2)
TRIGL SERPL-MCNC: 105 MG/DL (ref 0–150)
VLDLC SERPL CALC-MCNC: 21 MG/DL

## 2021-09-13 DIAGNOSIS — I65.23 BILATERAL CAROTID ARTERY STENOSIS: Primary | ICD-10-CM

## 2021-11-01 RX ORDER — METOPROLOL SUCCINATE 50 MG/1
TABLET, EXTENDED RELEASE ORAL
Qty: 180 TABLET | Refills: 3 | Status: SHIPPED | OUTPATIENT
Start: 2021-11-01

## 2021-11-21 DIAGNOSIS — Z79.4 CONTROLLED TYPE 2 DIABETES MELLITUS WITHOUT COMPLICATION, WITH LONG-TERM CURRENT USE OF INSULIN (HCC): ICD-10-CM

## 2021-11-21 DIAGNOSIS — E11.9 CONTROLLED TYPE 2 DIABETES MELLITUS WITHOUT COMPLICATION, WITH LONG-TERM CURRENT USE OF INSULIN (HCC): ICD-10-CM

## 2021-11-22 RX ORDER — LANCETS
EACH MISCELLANEOUS
Qty: 204 EACH | Refills: 3 | Status: SHIPPED | OUTPATIENT
Start: 2021-11-22 | End: 2021-12-20

## 2021-11-22 RX ORDER — GLIPIZIDE 5 MG/1
TABLET ORAL
Qty: 90 TABLET | Refills: 3 | Status: SHIPPED | OUTPATIENT
Start: 2021-11-22

## 2021-11-22 NOTE — TELEPHONE ENCOUNTER
Jasvir Sheikh is requesting refill(s)   Last OV 09/10/2021 (pertaining to medication)  LR 01/21/2021 and 11/16/2020  (per medication requested)  Next office visit scheduled or attempted New to provider visit on 12/20/2021

## 2021-12-08 RX ORDER — AMLODIPINE BESYLATE 5 MG/1
TABLET ORAL
Qty: 90 TABLET | Refills: 1 | Status: SHIPPED | OUTPATIENT
Start: 2021-12-08 | End: 2022-03-24 | Stop reason: SDUPTHER

## 2021-12-20 ENCOUNTER — OFFICE VISIT (OUTPATIENT)
Dept: PRIMARY CARE CLINIC | Age: 76
End: 2021-12-20
Payer: MEDICARE

## 2021-12-20 VITALS
BODY MASS INDEX: 27.32 KG/M2 | WEIGHT: 164 LBS | OXYGEN SATURATION: 97 % | TEMPERATURE: 97.5 F | DIASTOLIC BLOOD PRESSURE: 64 MMHG | SYSTOLIC BLOOD PRESSURE: 104 MMHG | RESPIRATION RATE: 16 BRPM | HEART RATE: 59 BPM | HEIGHT: 65 IN

## 2021-12-20 DIAGNOSIS — I25.10 ATHEROSCLEROSIS OF NATIVE CORONARY ARTERY OF NATIVE HEART WITHOUT ANGINA PECTORIS: Primary | ICD-10-CM

## 2021-12-20 DIAGNOSIS — Z23 NEED FOR INFLUENZA VACCINATION: ICD-10-CM

## 2021-12-20 DIAGNOSIS — I65.23 BILATERAL CAROTID ARTERY STENOSIS: ICD-10-CM

## 2021-12-20 DIAGNOSIS — I48.0 INTERMITTENT ATRIAL FIBRILLATION (HCC): ICD-10-CM

## 2021-12-20 DIAGNOSIS — E11.9 CONTROLLED TYPE 2 DIABETES MELLITUS WITHOUT COMPLICATION, WITH LONG-TERM CURRENT USE OF INSULIN (HCC): ICD-10-CM

## 2021-12-20 DIAGNOSIS — I10 PRIMARY HYPERTENSION: ICD-10-CM

## 2021-12-20 DIAGNOSIS — Z79.4 CONTROLLED TYPE 2 DIABETES MELLITUS WITHOUT COMPLICATION, WITH LONG-TERM CURRENT USE OF INSULIN (HCC): ICD-10-CM

## 2021-12-20 PROCEDURE — 90694 VACC AIIV4 NO PRSRV 0.5ML IM: CPT | Performed by: FAMILY MEDICINE

## 2021-12-20 PROCEDURE — G8484 FLU IMMUNIZE NO ADMIN: HCPCS | Performed by: FAMILY MEDICINE

## 2021-12-20 PROCEDURE — G0008 ADMIN INFLUENZA VIRUS VAC: HCPCS | Performed by: FAMILY MEDICINE

## 2021-12-20 PROCEDURE — G8417 CALC BMI ABV UP PARAM F/U: HCPCS | Performed by: FAMILY MEDICINE

## 2021-12-20 PROCEDURE — 1123F ACP DISCUSS/DSCN MKR DOCD: CPT | Performed by: FAMILY MEDICINE

## 2021-12-20 PROCEDURE — 4040F PNEUMOC VAC/ADMIN/RCVD: CPT | Performed by: FAMILY MEDICINE

## 2021-12-20 PROCEDURE — 99215 OFFICE O/P EST HI 40 MIN: CPT | Performed by: FAMILY MEDICINE

## 2021-12-20 PROCEDURE — 1036F TOBACCO NON-USER: CPT | Performed by: FAMILY MEDICINE

## 2021-12-20 PROCEDURE — G8427 DOCREV CUR MEDS BY ELIG CLIN: HCPCS | Performed by: FAMILY MEDICINE

## 2021-12-20 PROCEDURE — 3051F HG A1C>EQUAL 7.0%<8.0%: CPT | Performed by: FAMILY MEDICINE

## 2021-12-20 RX ORDER — POLYMYXIN B SULFATE AND TRIMETHOPRIM 1; 10000 MG/ML; [USP'U]/ML
1 SOLUTION OPHTHALMIC
COMMUNITY
Start: 2021-08-11 | End: 2022-05-17

## 2021-12-20 RX ORDER — DIFLUPREDNATE 0.5 MG/ML
EMULSION OPHTHALMIC
COMMUNITY
Start: 2021-08-04

## 2021-12-20 SDOH — ECONOMIC STABILITY: FOOD INSECURITY: WITHIN THE PAST 12 MONTHS, THE FOOD YOU BOUGHT JUST DIDN'T LAST AND YOU DIDN'T HAVE MONEY TO GET MORE.: NEVER TRUE

## 2021-12-20 SDOH — ECONOMIC STABILITY: FOOD INSECURITY: WITHIN THE PAST 12 MONTHS, YOU WORRIED THAT YOUR FOOD WOULD RUN OUT BEFORE YOU GOT MONEY TO BUY MORE.: NEVER TRUE

## 2021-12-20 ASSESSMENT — ENCOUNTER SYMPTOMS
NAUSEA: 0
SORE THROAT: 0
DIARRHEA: 0
SHORTNESS OF BREATH: 0
ABDOMINAL PAIN: 0
BLOOD IN STOOL: 0
COUGH: 0
RHINORRHEA: 1
VOMITING: 0

## 2021-12-20 ASSESSMENT — SOCIAL DETERMINANTS OF HEALTH (SDOH): HOW HARD IS IT FOR YOU TO PAY FOR THE VERY BASICS LIKE FOOD, HOUSING, MEDICAL CARE, AND HEATING?: NOT HARD AT ALL

## 2021-12-20 NOTE — PROGRESS NOTES
ICD with last generator change 10/4/21. No issues to area. Likely not related to left arm pain. Review of Systems   Constitutional: Negative for fatigue and fever. HENT: Positive for rhinorrhea (at times). Negative for nosebleeds and sore throat. Eyes:        Negative blurred vision or diplopia   Respiratory: Negative for cough and shortness of breath. Cardiovascular: Positive for leg swelling (at times). Negative for chest pain and palpitations. Gastrointestinal: Negative for abdominal pain, blood in stool, diarrhea, nausea and vomiting. Negative melena or indigestion   Endocrine: Positive for polyuria. Genitourinary: Negative for dysuria and hematuria. Musculoskeletal: Positive for arthralgias (R wrist and L shoulder at times). Skin: Negative for rash. Neurological: Negative for dizziness, seizures, syncope, speech difficulty, weakness and headaches. Positive paresthesias RUE at times    Psychiatric/Behavioral: Negative for dysphoric mood. The patient is not nervous/anxious. Vitals:    12/20/21 1402   BP: 104/64   Pulse: 59   Resp: 16   Temp: 97.5 °F (36.4 °C)   SpO2: 97%         Physical Exam  Vitals reviewed. Constitutional:       General: He is not in acute distress. HENT:      Mouth/Throat:      Mouth: Mucous membranes are moist.      Pharynx: Oropharynx is clear. Eyes:      General: No scleral icterus. Comments: Pink conjunctivae    Neck:      Thyroid: No thyromegaly. Comments: Positive R carotid bruit noted  Cardiovascular:      Heart sounds: Normal heart sounds. No murmur heard. No friction rub. No gallop. Pulmonary:      Effort: Pulmonary effort is normal.      Breath sounds: Normal breath sounds. Abdominal:      Palpations: Abdomen is soft. Tenderness: There is no abdominal tenderness.    Musculoskeletal:      Comments: Positive trace - 1+ leg edema B/L;  L shoulder : no soft tissue/bony tendernous to palpation noted Lymphadenopathy:      Cervical: No cervical adenopathy. Skin:     Findings: No rash. Neurological:      Mental Status: He is alert. Comments: Cranial nerves 2 - 12 grossly intact; Muscle strength 5/5 throughout   Psychiatric:         Mood and Affect: Mood normal.         ASSESSMENT AND PLAN    1. Need for influenza vaccination  - INFLUENZA, QUADV, ADJUVANTED, 72 YRS =, IM, PF, PREFILL SYR, 0.5ML (FLUAD)    2. Bilateral carotid artery stenosis  Patient is s/p L CEA and is clinically stable on present medications and with a nonfocal neuro exam    3. Controlled type 2 diabetes mellitus without complication, with long-term current use of insulin ([facility] [address]  Patient clinically stable on present medications and was told to follow a diabetic diet; avoiding concentrated sweets and consuming a low carbohydrate diet    4. Atherosclerosis of native coronary artery of native heart without angina pectoris  Pt is being followed by Cardiology and is clinically stable on present medications and had recent normal lipid panel [place] and denies any CP or SOB. Patient was told to call his Cardiologist to obtain his recent cardiac stress test results    5. Intermittent atrial fibrillation (HCC)  Pt with a pacemaker in place and is being anticoagulated with eliquis medication    6. Primary hypertension  Patient clinically stable on present medications and was told to follow a low sodium diet        Pratik Nye MD      Return in about 3 months (around 3/20/2022). Patient Instructions     Patient Education      Go to the ER ASAP if you develop any chest pain, shortness of breath, facial droop, slurred speech, weakness/numbness in your arms or legs or double vision! Learning About Carbohydrate (Carb) Counting and Eating Out When You Have Diabetes  Why plan your meals? Meal planning can be a key part of managing diabetes.  Planning meals and snacks with the right balance of carbohydrate, protein, and fat can help you keep your blood sugar at the target level you set with your doctor. You don't have to eat special foods. You can eat what your family eats, including sweets once in a while. But you do have to pay attention to how often you eat and how much you eat of certain foods. You may want to work with a dietitian or a certified diabetes educator. He or she can give you tips and meal ideas and can answer your questions about meal planning. This health professional can also help you reach a healthy weight if that is one of your goals. What should you know about eating carbs? Managing the amount of carbohydrate (carbs) you eat is an important part of healthy meals when you have diabetes. Carbohydrate is found in many foods. · Learn which foods have carbs. And learn the amounts of carbs in different foods. ? Bread, cereal, pasta, and rice have about 15 grams of carbs in a serving. A serving is 1 slice of bread (1 ounce), ½ cup of cooked cereal, or 1/3 cup of cooked pasta or rice. ? Fruits have 15 grams of carbs in a serving. A serving is 1 small fresh fruit, such as an apple or orange; ½ of a banana; ½ cup of cooked or canned fruit; ½ cup of fruit juice; 1 cup of melon or raspberries; or 2 tablespoons of dried fruit. ? Milk and no-sugar-added yogurt have 15 grams of carbs in a serving. A serving is 1 cup of milk or 2/3 cup of no-sugar-added yogurt. ? Starchy vegetables have 15 grams of carbs in a serving. A serving is ½ cup of mashed potatoes or sweet potato; 1 cup winter squash; ½ of a small baked potato; ½ cup of cooked beans; or ½ cup cooked corn or green peas. · Learn how much carbs to eat each day and at each meal. A dietitian or CDE can teach you how to keep track of the amount of carbs you eat. This is called carbohydrate counting. · If you are not sure how to count carbohydrate grams, use the Plate Method to plan meals.  It is a good, quick way to make sure that you have a balanced meal. It also helps you spread carbs throughout the day. ? Divide your plate by types of foods. Put non-starchy vegetables on half the plate, meat or other protein food on one-quarter of the plate, and a grain or starchy vegetable in the final quarter of the plate. To this you can add a small piece of fruit and 1 cup of milk or yogurt, depending on how many carbs you are supposed to eat at a meal.  · Try to eat about the same amount of carbs at each meal. Do not \"save up\" your daily allowance of carbs to eat at one meal.  · Proteins have very little or no carbs per serving. Examples of proteins are beef, chicken, turkey, fish, eggs, tofu, cheese, cottage cheese, and peanut butter. A serving size of meat is 3 ounces, which is about the size of a deck of cards. Examples of meat substitute serving sizes (equal to 1 ounce of meat) are 1/4 cup of cottage cheese, 1 egg, 1 tablespoon of peanut butter, and ½ cup of tofu. How can you eat out and still eat healthy? · Learn to estimate the serving sizes of foods that have carbohydrate. If you measure food at home, it will be easier to estimate the amount in a serving of restaurant food. · If the meal you order has too much carbohydrate (such as potatoes, corn, or baked beans), ask to have a low-carbohydrate food instead. Ask for a salad or green vegetables. · If you use insulin, check your blood sugar before and after eating out to help you plan how much to eat in the future. · If you eat more carbohydrate at a meal than you had planned, take a walk or do other exercise. This will help lower your blood sugar. What are some tips for eating healthy? · Limit saturated fat, such as the fat from meat and dairy products. This is a healthy choice because people who have diabetes are at higher risk of heart disease. So choose lean cuts of meat and nonfat or low-fat dairy products. Use olive or canola oil instead of butter or shortening when cooking. · Don't skip meals.  Your blood sugar may drop too low if you skip meals and take insulin or certain medicines for diabetes. · Check with your doctor before you drink alcohol. Alcohol can cause your blood sugar to drop too low. Alcohol can also cause a bad reaction if you take certain diabetes medicines. Follow-up care is a key part of your treatment and safety. Be sure to make and go to all appointments, and call your doctor if you are having problems. It's also a good idea to know your test results and keep a list of the medicines you take. Where can you learn more? Go to https://Voodoo TacopeLocondo.jp.HacemeUnRegalo.com. org and sign in to your [employer] account. Enter W245 in the Yhat box to learn more about \"Learning About Carbohydrate (Carb) Counting and Eating Out When You Have Diabetes. \"     If you do not have an account, please click on the \"Sign Up Now\" link. Current as of: December 17, 2020               Content Version: 13.0  © 1829-6738 Healthwise, Incorporated. Care instructions adapted under license by [facility] ([facility]). If you have questions about a medical condition or this instruction, always ask your healthcare professional. [place] [address] any warranty or liability for your use of this information.

## 2021-12-20 NOTE — PATIENT INSTRUCTIONS
Patient Education      Go to the ER ASAP if you develop any chest pain, shortness of breath, facial droop, slurred speech, weakness/numbness in your arms or legs or double vision! Learning About Carbohydrate (Carb) Counting and Eating Out When You Have Diabetes  Why plan your meals? Meal planning can be a key part of managing diabetes. Planning meals and snacks with the right balance of carbohydrate, protein, and fat can help you keep your blood sugar at the target level you set with your doctor. You don't have to eat special foods. You can eat what your family eats, including sweets once in a while. But you do have to pay attention to how often you eat and how much you eat of certain foods. You may want to work with a dietitian or a certified diabetes educator. He or she can give you tips and meal ideas and can answer your questions about meal planning. This health professional can also help you reach a healthy weight if that is one of your goals. What should you know about eating carbs? Managing the amount of carbohydrate (carbs) you eat is an important part of healthy meals when you have diabetes. Carbohydrate is found in many foods. · Learn which foods have carbs. And learn the amounts of carbs in different foods. ? Bread, cereal, pasta, and rice have about 15 grams of carbs in a serving. A serving is 1 slice of bread (1 ounce), ½ cup of cooked cereal, or 1/3 cup of cooked pasta or rice. ? Fruits have 15 grams of carbs in a serving. A serving is 1 small fresh fruit, such as an apple or orange; ½ of a banana; ½ cup of cooked or canned fruit; ½ cup of fruit juice; 1 cup of melon or raspberries; or 2 tablespoons of dried fruit. ? Milk and no-sugar-added yogurt have 15 grams of carbs in a serving. A serving is 1 cup of milk or 2/3 cup of no-sugar-added yogurt. ? Starchy vegetables have 15 grams of carbs in a serving.  A serving is ½ cup of mashed potatoes or sweet potato; 1 cup winter squash; ½ of a small baked potato; ½ cup of cooked beans; or ½ cup cooked corn or green peas. · Learn how much carbs to eat each day and at each meal. A dietitian or CDE can teach you how to keep track of the amount of carbs you eat. This is called carbohydrate counting. · If you are not sure how to count carbohydrate grams, use the Plate Method to plan meals. It is a good, quick way to make sure that you have a balanced meal. It also helps you spread carbs throughout the day. ? Divide your plate by types of foods. Put non-starchy vegetables on half the plate, meat or other protein food on one-quarter of the plate, and a grain or starchy vegetable in the final quarter of the plate. To this you can add a small piece of fruit and 1 cup of milk or yogurt, depending on how many carbs you are supposed to eat at a meal.  · Try to eat about the same amount of carbs at each meal. Do not \"save up\" your daily allowance of carbs to eat at one meal.  · Proteins have very little or no carbs per serving. Examples of proteins are beef, chicken, turkey, fish, eggs, tofu, cheese, cottage cheese, and peanut butter. A serving size of meat is 3 ounces, which is about the size of a deck of cards. Examples of meat substitute serving sizes (equal to 1 ounce of meat) are 1/4 cup of cottage cheese, 1 egg, 1 tablespoon of peanut butter, and ½ cup of tofu. How can you eat out and still eat healthy? · Learn to estimate the serving sizes of foods that have carbohydrate. If you measure food at home, it will be easier to estimate the amount in a serving of restaurant food. · If the meal you order has too much carbohydrate (such as potatoes, corn, or baked beans), ask to have a low-carbohydrate food instead. Ask for a salad or green vegetables. · If you use insulin, check your blood sugar before and after eating out to help you plan how much to eat in the future.   · If you eat more carbohydrate at a meal than you had planned, take a walk or do other exercise. This will help lower your blood sugar. What are some tips for eating healthy? · Limit saturated fat, such as the fat from meat and dairy products. This is a healthy choice because people who have diabetes are at higher risk of heart disease. So choose lean cuts of meat and nonfat or low-fat dairy products. Use olive or canola oil instead of butter or shortening when cooking. · Don't skip meals. Your blood sugar may drop too low if you skip meals and take insulin or certain medicines for diabetes. · Check with your doctor before you drink alcohol. Alcohol can cause your blood sugar to drop too low. Alcohol can also cause a bad reaction if you take certain diabetes medicines. Follow-up care is a key part of your treatment and safety. Be sure to make and go to all appointments, and call your doctor if you are having problems. It's also a good idea to know your test results and keep a list of the medicines you take. Where can you learn more? Go to https://GnodalpeTappit.Biom'Up. org and sign in to your [employer] account. Enter A248 in the Casabu box to learn more about \"Learning About Carbohydrate (Carb) Counting and Eating Out When You Have Diabetes. \"     If you do not have an account, please click on the \"Sign Up Now\" link. Current as of: December 17, 2020               Content Version: 13.0  © 1598-1658 Healthwise, Incorporated. Care instructions adapted under license by [facility] ([facility]). If you have questions about a medical condition or this instruction, always ask your healthcare professional. [place] [address] any warranty or liability for your use of this information.

## 2021-12-22 NOTE — PROGRESS NOTES
Subjective:      Patient ID: Lucian Herrera is a 74 y.o. male. HPI  Venous stasis  Swelling both legs after sister visited and ate out at restaurants. No pain. No redness. Past Medical History:   Diagnosis Date    DVT     Hyperlipidemia     Hypertension     Stenosis     subclavical    Type II or unspecified type diabetes mellitus without mention of complication, not stated as uncontrolled      Current Outpatient Prescriptions   Medication Sig Dispense Refill    ramipril (ALTACE) 10 MG capsule TAKE 1 CAPSULE BY MOUTH  EVERY EVENING 90 capsule 1    ULTICARE MINI PEN NEEDLES 31G X 6 MM MISC USE WITH INSULIN  ADMINISTRATION ONCE DAILY 100 each 1    atorvastatin (LIPITOR) 20 MG tablet TAKE 1 TABLET BY MOUTH  DAILY 90 tablet 2    glipiZIDE (GLUCOTROL) 5 MG tablet TAKE 1 TABLET BY MOUTH  DAILY 90 tablet 2    metoprolol succinate (TOPROL XL) 50 MG extended release tablet TAKE 1 TABLET BY MOUTH TWO  TIMES DAILY 180 tablet 2    JANUVIA 100 MG tablet TAKE 1 TABLET BY MOUTH  DAILY 90 tablet 2    ACCU-CHEK BRITNEY PLUS strip TEST TWO TIMES DAILY FOR  DIABETES 200 strip 2    Lancet Devices (ACCU-CHEK SOFTCLIX LANCET DEV) MISC tewst twice daily for diabetes e11.9 100 each 3    Lancet Devices (PUBLIX LANCET AUTO INJECTOR) MISC 1 applicator by Does not apply route daily 1 each 0    ramipril (ALTACE) 5 MG capsule Take 1 capsule by mouth every morning 90 capsule 2    ACCU-CHEK FASTCLIX LANCETS MISC Test two times daily for  diabetes 204 each 2    triamcinolone (KENALOG) 0.1 % ointment Apply to affected area twice daily for up to 2 weeks or until improved. 80 g 2    ketoconazole (NIZORAL) 2 % cream Apply topically daily.  30 g 1    LANTUS SOLOSTAR 100 UNIT/ML injection pen INJECT 10 UNITS INTO THE  SKIN NIGHTLY 15 mL 2    Lancets Misc. (ACCU-CHEK FASTCLIX LANCET) KIT Test twice daily for diabetes e11.9 1 kit 5    Insulin Syringe-Needle U-100 ([employer] INSULIN SYRINGE) 31G X 5/16\" 0.5 ML MISC 1 each by Does not apply route daily Use daily for insulin adminstration 100 each 3    Blood Glucose Monitoring Suppl (ACCU-CHEK BRITNEY PLUS) W/DEVICE KIT 1 each by Does not apply route 2 times daily. TEST TWICE DAILY FOR DIABETES 1 kit 0    aspirin EC 81 MG EC tablet Take 1 tablet by mouth daily. No current facility-administered medications for this visit. Allergies   Allergen Reactions    Ticlid [Ticlopidine Hydrochloride]      rash     Social History   Substance Use Topics    Smoking status: Never Smoker    Smokeless tobacco: Never Used    Alcohol use No     Family History   Problem Relation Age of Onset    Coronary Art Dis Mother     Hypertension Mother     Coronary Art Dis Father     Hypertension Father        Review of Systems   Constitutional: Negative. Respiratory: Negative. Cardiovascular: Positive for leg swelling. Gastrointestinal: Negative. Endocrine: Negative. Genitourinary: Negative. Musculoskeletal: Negative. Allergic/Immunologic: Negative. Neurological: Negative. Hematological: Negative. Psychiatric/Behavioral: Negative. Objective:   Physical Exam   Constitutional: He is oriented to person, place, and time. Vital signs are normal. He appears well-developed and well-nourished. No distress. HENT:   Head: Normocephalic and atraumatic. Neck: Trachea normal, normal range of motion, full passive range of motion without pain and phonation normal. Neck supple. Normal carotid pulses, no hepatojugular reflux and no JVD present. Carotid bruit is not present. No thyroid mass and no thyromegaly present. Cardiovascular: Normal rate, regular rhythm, normal heart sounds, intact distal pulses and normal pulses. No extrasystoles are present. PMI is not displaced. Exam reveals no gallop and no friction rub. No murmur heard. Pulses:       Carotid pulses are 2+ on the right side with bruit, and 2+ on the left side.        Radial pulses are 2+ on the right side, and 2+ on patient

## 2021-12-23 RX ORDER — PEN NEEDLE, DIABETIC 29 G X1/2"
NEEDLE, DISPOSABLE MISCELLANEOUS
Qty: 200 EACH | Refills: 1 | Status: SHIPPED | OUTPATIENT
Start: 2021-12-23 | End: 2022-07-11

## 2021-12-23 NOTE — TELEPHONE ENCOUNTER
Medication:   Requested Prescriptions     Pending Prescriptions Disp Refills    Insulin Pen Needle (ULTICARE MINI PEN NEEDLES) 31G X 6 MM MISC [Pharmacy Med Name: Sharon Chino 200 each 1     Sig: USE TWICE DAILY FOR INSULIN INJECTION        Last Filled:      Patient Phone Number: [phone] (home)     Last appt: 12/20/2021   Next appt: 3/22/2022    Last OARRS: No flowsheet data found.

## 2022-02-23 RX ORDER — ATORVASTATIN CALCIUM 20 MG/1
TABLET, FILM COATED ORAL
Qty: 90 TABLET | Refills: 1 | Status: SHIPPED | OUTPATIENT
Start: 2022-02-23 | End: 2022-09-06

## 2022-03-07 RX ORDER — SITAGLIPTIN 100 MG/1
TABLET, FILM COATED ORAL
Qty: 90 TABLET | Refills: 3 | OUTPATIENT
Start: 2022-03-07

## 2022-03-24 ENCOUNTER — OFFICE VISIT (OUTPATIENT)
Dept: PRIMARY CARE CLINIC | Age: 77
End: 2022-03-24
Payer: MEDICARE

## 2022-03-24 VITALS
TEMPERATURE: 97.4 F | DIASTOLIC BLOOD PRESSURE: 68 MMHG | BODY MASS INDEX: 27.66 KG/M2 | WEIGHT: 166 LBS | SYSTOLIC BLOOD PRESSURE: 114 MMHG | RESPIRATION RATE: 16 BRPM | HEIGHT: 65 IN | HEART RATE: 73 BPM | OXYGEN SATURATION: 97 %

## 2022-03-24 DIAGNOSIS — Z79.4 CONTROLLED TYPE 2 DIABETES MELLITUS WITHOUT COMPLICATION, WITH LONG-TERM CURRENT USE OF INSULIN (HCC): ICD-10-CM

## 2022-03-24 DIAGNOSIS — Z00.00 MEDICARE ANNUAL WELLNESS VISIT, SUBSEQUENT: Primary | ICD-10-CM

## 2022-03-24 DIAGNOSIS — E11.9 CONTROLLED TYPE 2 DIABETES MELLITUS WITHOUT COMPLICATION, WITH LONG-TERM CURRENT USE OF INSULIN (HCC): ICD-10-CM

## 2022-03-24 DIAGNOSIS — I10 PRIMARY HYPERTENSION: ICD-10-CM

## 2022-03-24 DIAGNOSIS — I25.10 ATHEROSCLEROSIS OF NATIVE CORONARY ARTERY OF NATIVE HEART WITHOUT ANGINA PECTORIS: ICD-10-CM

## 2022-03-24 DIAGNOSIS — I48.0 INTERMITTENT ATRIAL FIBRILLATION (HCC): ICD-10-CM

## 2022-03-24 DIAGNOSIS — I65.23 BILATERAL CAROTID ARTERY STENOSIS: ICD-10-CM

## 2022-03-24 LAB
A/G RATIO: 1.6 (ref 1.1–2.2)
ALBUMIN SERPL-MCNC: 4.2 G/DL (ref 3.4–5)
ALP BLD-CCNC: 54 U/L (ref 40–129)
ALT SERPL-CCNC: 17 U/L (ref 10–40)
ANION GAP SERPL CALCULATED.3IONS-SCNC: 11 MMOL/L (ref 3–16)
AST SERPL-CCNC: 17 U/L (ref 15–37)
BASOPHILS ABSOLUTE: 0 K/UL (ref 0–0.2)
BASOPHILS RELATIVE PERCENT: 0.4 %
BILIRUB SERPL-MCNC: 0.6 MG/DL (ref 0–1)
BUN BLDV-MCNC: 21 MG/DL (ref 7–20)
CALCIUM SERPL-MCNC: 9.6 MG/DL (ref 8.3–10.6)
CHLORIDE BLD-SCNC: 102 MMOL/L (ref 99–110)
CO2: 26 MMOL/L (ref 21–32)
CREAT SERPL-MCNC: 1.2 MG/DL (ref 0.8–1.3)
EOSINOPHILS ABSOLUTE: 0.1 K/UL (ref 0–0.6)
EOSINOPHILS RELATIVE PERCENT: 1.1 %
GFR AFRICAN AMERICAN: >60
GFR NON-AFRICAN AMERICAN: 58
GLUCOSE BLD-MCNC: 107 MG/DL (ref 70–99)
HCT VFR BLD CALC: 41.5 % (ref 40.5–52.5)
HEMOGLOBIN: 14.1 G/DL (ref 13.5–17.5)
LYMPHOCYTES ABSOLUTE: 1.8 K/UL (ref 1–5.1)
LYMPHOCYTES RELATIVE PERCENT: 28.7 %
MCH RBC QN AUTO: 28.4 PG (ref 26–34)
MCHC RBC AUTO-ENTMCNC: 33.9 G/DL (ref 31–36)
MCV RBC AUTO: 83.7 FL (ref 80–100)
MONOCYTES ABSOLUTE: 0.6 K/UL (ref 0–1.3)
MONOCYTES RELATIVE PERCENT: 9.2 %
NEUTROPHILS ABSOLUTE: 3.7 K/UL (ref 1.7–7.7)
NEUTROPHILS RELATIVE PERCENT: 60.6 %
PDW BLD-RTO: 15.1 % (ref 12.4–15.4)
PLATELET # BLD: 147 K/UL (ref 135–450)
PMV BLD AUTO: 9 FL (ref 5–10.5)
POTASSIUM SERPL-SCNC: 4.3 MMOL/L (ref 3.5–5.1)
RBC # BLD: 4.96 M/UL (ref 4.2–5.9)
SODIUM BLD-SCNC: 139 MMOL/L (ref 136–145)
TOTAL PROTEIN: 6.9 G/DL (ref 6.4–8.2)
TSH REFLEX: 1.14 UIU/ML (ref 0.27–4.2)
WBC # BLD: 6.1 K/UL (ref 4–11)

## 2022-03-24 PROCEDURE — 1123F ACP DISCUSS/DSCN MKR DOCD: CPT | Performed by: FAMILY MEDICINE

## 2022-03-24 PROCEDURE — 3051F HG A1C>EQUAL 7.0%<8.0%: CPT | Performed by: FAMILY MEDICINE

## 2022-03-24 PROCEDURE — G0439 PPPS, SUBSEQ VISIT: HCPCS | Performed by: FAMILY MEDICINE

## 2022-03-24 PROCEDURE — 4040F PNEUMOC VAC/ADMIN/RCVD: CPT | Performed by: FAMILY MEDICINE

## 2022-03-24 PROCEDURE — G8484 FLU IMMUNIZE NO ADMIN: HCPCS | Performed by: FAMILY MEDICINE

## 2022-03-24 RX ORDER — AMLODIPINE BESYLATE 5 MG/1
TABLET ORAL
Qty: 90 TABLET | Refills: 1 | Status: SHIPPED | OUTPATIENT
Start: 2022-03-24

## 2022-03-24 ASSESSMENT — PATIENT HEALTH QUESTIONNAIRE - PHQ9
SUM OF ALL RESPONSES TO PHQ QUESTIONS 1-9: 0
SUM OF ALL RESPONSES TO PHQ9 QUESTIONS 1 & 2: 0
1. LITTLE INTEREST OR PLEASURE IN DOING THINGS: 0
2. FEELING DOWN, DEPRESSED OR HOPELESS: 0
SUM OF ALL RESPONSES TO PHQ QUESTIONS 1-9: 0
1. LITTLE INTEREST OR PLEASURE IN DOING THINGS: 0
SUM OF ALL RESPONSES TO PHQ9 QUESTIONS 1 & 2: 0
2. FEELING DOWN, DEPRESSED OR HOPELESS: 0

## 2022-03-24 ASSESSMENT — ENCOUNTER SYMPTOMS
RHINORRHEA: 1
NAUSEA: 0
ABDOMINAL PAIN: 0
COUGH: 0
BLOOD IN STOOL: 0
SORE THROAT: 0
SHORTNESS OF BREATH: 0
VOMITING: 0

## 2022-03-24 ASSESSMENT — LIFESTYLE VARIABLES: HOW OFTEN DO YOU HAVE A DRINK CONTAINING ALCOHOL: NEVER

## 2022-03-24 NOTE — PROGRESS NOTES
Medicare Annual Wellness Visit  Name: Jyoti Leger Date: 3/24/2022   MRN: [id number] Sex: Male   Age: 76 y.o. Ethnicity: Non- / Non    : 1945 Race:       Jasvir Sheikh is here for Medicare AWV    Pt has a hx of diabetes and is on glucotrol, januvia and lantus insulin 10 units BID and reports FBS readings betweeen 86 - 100       Patient has a past medical history significant for CAD s/p CABG , HTN and A fib s/p ablation 2021 and is followed by Cardiology and is on norvasc, toprol, lipitor and eliquis medications and had recent cardiac stress test which was normal    Cardiac stress test 12/10/2021  Stress ECG Impressions: EKG was nondiagnostic for ischemia because of baseline ST changes  Summary: Myocardial perfusion imaging study results were low risk. No evidence of infarction or ischemia. Normal left ventricular chamber size and wall motion. Ejection fraction calculated at 64%. Pt has a hx of carotid stenosis s/p L CEA     Pt denies any hx of stroke, CHF or kidney disease     Pt reports a normal colonoscopy about 5 year sago     Pt is a nonsmoker and denies any alcohol use     Pt did complete his COVID vaccine series    Screenings for behavioral, psychosocial and functional/safety risks, and cognitive dysfunction are all negative except as indicated below. These results, as well as other patient data from the [address] form, are documented in Flowsheets linked to this Encounter. Allergies   Allergen Reactions    Ace Inhibitors Other (See Comments)     angioedema  Other reaction(s): angioedema/H&P    Ticlid [Ticlopidine Hydrochloride]      rash    Ticlopidine      Other reaction(s): rash/H&P       Prior to Visit Medications    Medication Sig Taking?  Authorizing Provider   amLODIPine (NORVASC) 5 MG tablet TAKE ONE TABLET BY MOUTH DAILY Yes Nico Woodson MD   atorvastatin (LIPITOR) 20 MG tablet TAKE 1 TABLET BY MOUTH  DAILY Yes Nico Woodson MD insulin lispro, 1 Unit Dial, 100 UNIT/ML SOPN INJECT UNDER THE SKIN 5 UNITS DAILY BEFORE EVENING MEAL Yes Thor Harrison MD   Insulin Pen Needle (ULTICARE MINI PEN NEEDLES) 31G X 6 MM MISC USE TWICE DAILY FOR INSULIN INJECTION Yes Luciano Prince MD   difluprednate (DUREZOL) 0.05 % EMUL instill 1 drop by ophthalmic route 2 times every day into the right eye and 8 times a day in the left eye Yes Historical Provider, MD   trimethoprim-polymyxin b (POLYTRIM) 92117-2.1 UNIT/ML-% ophthalmic solution Apply 1 drop to eye Yes Historical Provider, MD   glipiZIDE (GLUCOTROL) 5 MG tablet TAKE 1 TABLET BY MOUTH  DAILY Yes Luciano Prince MD   metoprolol succinate (TOPROL XL) 50 MG extended release tablet TAKE 1 TABLET BY MOUTH TWO  TIMES DAILY Yes Luciano Prince MD   blood glucose test strips (ACCU-CHEK BRITNEY PLUS) strip TEST TWICE DAILY FOR  DIABETES Yes Luciano Prince MD   insulin glargine (LANTUS SOLOSTAR) 100 UNIT/ML injection pen Inject 10 Units into the skin 2 times daily Before breakfast and evening meals Yes PAOLA Gautam MD   JANUVIA 100 MG tablet TAKE 1 TABLET BY MOUTH  DAILY Yes Luciano Prince MD   insulin aspart (NOVOLOG FLEXPEN) 100 UNIT/ML injection pen Before breakfast and evening meals  Patient taking differently: 5 Units Before breakfast and evening meals Yes PAOLA Gautam MD   latanoprost (XALATAN) 0.005 % ophthalmic solution  Yes Historical Provider, MD   dorzolamide-timolol (COSOPT) 22.3-6.8 MG/ML ophthalmic solution  Yes Historical Provider, MD   brimonidine (ALPHAGAN) 0.2 % ophthalmic solution  Yes Historical Provider, MD   Accu-Chek FastClix Lancets MISC Test two times daily for  diabetes Yes Luciano Prince MD   Insulin Syringe-Needle U-100 ([employer] INSULIN SYRINGE) 31G X 5/16\" 0.5 ML MISC 1 each by Does not apply route daily Use daily for insulin adminstration Yes Luciano Prince MD   Blood Glucose Monitoring Suppl (ACCU-CHEK BRITNEY PLUS) W/DEVICE KIT 1 each by Does not apply route 2 times daily. TEST TWICE DAILY FOR DIABETES Yes Lucita Ann MD       Past Medical History:   Diagnosis Date    CAD (coronary artery disease)     DVT     Hyperlipidemia     Hypertension     Stenosis     subclavical    Type II or unspecified type diabetes mellitus without mention of complication, not stated as uncontrolled      Past Surgical History:   Procedure Laterality Date    CARDIAC DEFIBRILLATOR PLACEMENT      CORONARY ARTERY BYPASS GRAFT  2004    TN THROMBOENDARTECTMY Janice Velasquez INCIS Left 9/10/2018    LEFT CAROTID ENDARTERECTOMY performed by Tommy Nelson MD at [address] [facility]       Family History   Problem Relation Age of Onset    Coronary Art Dis Mother     Hypertension Mother     Coronary Art Dis Father     Hypertension Father        CareTeam (Including outside providers/suppliers regularly involved in providing care):   Patient Care Team:  Dontae Kirkland MD as PCP - General (Family Medicine)  Dontae Kirkland MD as PCP [facility]  Guerda Castellano MD (Inactive) (Cardiology)  Sage Acosta MD as Consulting Physician (Otolaryngology)    Review of Systems   Constitutional: Negative for fatigue and fever. HENT: Positive for rhinorrhea (at times). Negative for nosebleeds and sore throat. Eyes:        Negative blurred vision or diplopia   Respiratory: Negative for cough and shortness of breath. Cardiovascular: Negative for chest pain, palpitations and leg swelling. Gastrointestinal: Negative for abdominal pain, blood in stool, nausea and vomiting. Negative melena or indigestion   Endocrine: Positive for polyuria. Genitourinary: Negative for dysuria and hematuria. Negative hesitancy    Musculoskeletal: Negative for arthralgias. Skin: Negative for rash. Neurological: Negative for dizziness, seizures, syncope, speech difficulty, weakness and headaches.         Positive paresthesias RUE at times    Psychiatric/Behavioral: Negative for dysphoric mood. The patient is not nervous/anxious. Wt Readings from Last 3 Encounters:   03/24/22 166 lb (75.3 kg)   12/20/21 164 lb (74.4 kg)   09/10/21 166 lb (75.3 kg)     Vitals:    03/24/22 1004   BP: 114/68   Pulse: 73   Resp: 16   Temp: 97.4 °F (36.3 °C)   SpO2: 97%   Weight: 166 lb (75.3 kg)   Height: 5' 5\" (1.651 m)     Body mass index is 27.62 kg/m². Based upon direct observation of the patient, evaluation of cognition reveals recent and remote memory intact. Physical Exam  Vitals reviewed. Constitutional:       General: He is not in acute distress. HENT:      Mouth/Throat:      Mouth: Mucous membranes are moist.      Pharynx: Oropharynx is clear. Eyes:      General: No scleral icterus. Comments: Pink conjunctivae    Neck:      Thyroid: No thyromegaly. Comments: Positive R carotid bruit noted  Cardiovascular:      Heart sounds: Normal heart sounds. No murmur heard. No friction rub. No gallop. Pulmonary:      Effort: Pulmonary effort is normal.      Breath sounds: Normal breath sounds. Abdominal:      Palpations: Abdomen is soft. Tenderness: There is no abdominal tenderness. Musculoskeletal:      Comments: Positive  2 -3+ leg edema but no calf tendernous to palpation noted B/L   Lymphadenopathy:      Cervical: No cervical adenopathy. Skin:     Findings: No rash. Neurological:      Mental Status: He is alert. Comments: Cranial nerves 2 - 12 grossly intact; Muscle strength 5/5 throughout   Psychiatric:         Mood and Affect: Mood normal.          Patient's complete Health Risk Assessment and screening values have been reviewed and are found in Flowsheets. The following problems were reviewed today and where indicated follow up appointments were made and/or referrals ordered.     Positive Risk Factor Screenings with Interventions:            General Health and ACP:  General  In general, how would you say your health is?: Good  In the past 7 days, have you experienced any of the following: New or Increased Pain, New or Increased Fatigue, Loneliness, Social Isolation, Stress or Anger?: No  Do you get the social and emotional support that you need?: (!) No  Do you have a Living Will?: Yes    Advance Directives     Power of  Living Will ACP-Advance Directive ACP-Power of     Not on File Not on File Not on File Not on File      General Health Risk Interventions:  · Patient states that he lives with his wife and is not alone      Safety:  Do you have working smoke detectors?: Yes  Do you have any tripping hazards - loose or unsecured carpets or rugs?: (!) Yes  Do you have any tripping hazards - clutter in doorways, halls, or stairs?: No  Do you have either shower bars, grab bars, non-slip mats or non-slip surfaces in your shower or bathtub?: Yes  Do all of your stairways have a railing or banister?: Yes  Do you always fasten your seatbelt when you are in a car?: Yes    Safety Interventions:  · Patient was advised to get rid of loose carpets in his home     Personalized Preventive Plan   Current Health Maintenance Status  Immunization History   Administered Date(s) Administered    COVID-19, Moderna, Booster, PF, 50mcg/0.25ml 11/19/2021    COVID-19, Moderna, Primary or Immunocompromised, PF, 100mcg/0.5mL 01/31/2021, 01/31/2021, 02/28/2021, 02/28/2021    Influenza 10/21/2010, 10/15/2012    Influenza Vaccine, unspecified formulation 12/21/2007, 11/13/2008, 10/31/2014, 10/01/2015    Influenza Virus Vaccine 11/14/2013    Influenza Whole 11/13/2008, 10/07/2011, 11/14/2013    Influenza, High Dose (Fluzone 65 yrs and older) 10/31/2014, 10/01/2015, 11/21/2016, 12/05/2017, 10/08/2018    Influenza, High-dose, Neal Earl, 65 yrs +, IM (Fluzone) 10/26/2020    Influenza, Quadv, IM, PF (6 mo and older Fluzone, Flulaval, Fluarix, and 3 yrs and older Afluria) 01/10/2020    Influenza, Quadv, adjuvanted, 65 yrs +, IM, PF (Fluad) 12/20/2021    Pneumococcal Conjugate 13-valent Rosita Wooten) 01/07/2016    Pneumococcal Conjugate 7-valent (Prevnar7) 10/29/2003    Pneumococcal Polysaccharide ([id number]) 10/29/2003, 01/04/2013    Td, unspecified formulation 02/18/2004, 10/31/2014    Tdap (Boostrix, Adacel) 02/18/2004    Zoster Recombinant (Shingrix) 08/23/2019, 10/10/2019, 12/23/2019        Patient Instructions     Personalized Preventive Plan for David Mccann - 3/24/2022  Medicare offers a range of preventive health benefits. Some of the tests and screenings are paid in full while other may be subject to a deductible, co-insurance, and/or copay. Some of these benefits include a comprehensive review of your medical history including lifestyle, illnesses that may run in your family, and various assessments and screenings as appropriate. After reviewing your medical record and screening and assessments performed today your provider may have ordered immunizations, labs, imaging, and/or referrals for you. A list of these orders (if applicable) as well as your Preventive Care list are included within your After Visit Summary for your review. Other Preventive Recommendations:    · A preventive eye exam performed by an eye specialist is recommended every 1-2 years to screen for glaucoma; cataracts, macular degeneration, and other eye disorders. · A preventive dental visit is recommended every 6 months. · Try to get at least 150 minutes of exercise per week or 10,000 steps per day on a pedometer . · Order or download the FREE \"Exercise & Physical Activity: Your Everyday Guide\" from [employer]. Call 0-885.942.4581 or search The [employer] online. · You need 8483-0207 mg of calcium and 0670-1158 IU of vitamin D per day.  It is possible to meet your calcium requirement with diet alone, but a vitamin D supplement is usually necessary to meet this goal.  · When exposed to the sun, use a sunscreen that protects against both UVA and UVB radiation with an SPF of 30 or greater. Reapply every 2 to 3 hours or after sweating, drying off with a towel, or swimming. · Always wear a seat belt when traveling in a car. Always wear a helmet when riding a bicycle or motorcycle. Health Maintenance   Topic Date Due    Hepatitis C screen  Never done    DTaP/Tdap/Td vaccine (1 - Tdap) 11/01/2014    Annual Wellness Visit (AWV)  01/09/2021    Lipid screen  09/10/2022    Depression Screen  09/10/2022    Potassium monitoring  09/10/2022    Creatinine monitoring  09/10/2022    Flu vaccine  Completed    Shingles Vaccine  Completed    Pneumococcal 65+ years Vaccine  Completed    COVID-19 Vaccine  Completed    Hepatitis A vaccine  Aged Out    Hib vaccine  Aged Out    Meningococcal (ACWY) vaccine  Aged Out     Recommendations for [employer] Due: see orders and patient instructions/AVS.  . Recommended screening schedule for the next 5-10 years is provided to the patient in written form: see Patient Instructions/AVS.    1. Medicare annual wellness visit, subsequent    2. Bilateral carotid artery stenosis  Patient s/p L CEA and is clinically stable on present medications and with a nonfocal neuro exam    3. Atherosclerosis of native coronary artery of native heart without angina pectoris  Patient clinically stable on present medications and denies any CP or SOB and had a recent normal cardiac stress test  - Comprehensive Metabolic Panel; Future  - CBC with Auto Differential; Future    4. Primary hypertension  Patient clinically stable on present medications and was told to follow a low sodium diet and to keep his legs elevated as much as possible given his noted leg edema on PE  - amLODIPine (NORVASC) 5 MG tablet; TAKE ONE TABLET BY MOUTH DAILY  Dispense: 90 tablet; Refill: 1  - Comprehensive Metabolic Panel; Future  - TSH with Reflex; Future  - CBC with Auto Differential; Future    5.  Intermittent atrial fibrillation (HCC)  Pt is followed by Cardiology and has a pacemaker in place and is being anticoagulated with eliquis medication  - TSH with Reflex; Future    6. Controlled type 2 diabetes mellitus without complication, with long-term current use of insulin ([facility] [address]  Patient clinically stable on present medications and will check basic bloodwork  - Comprehensive Metabolic Panel;  Future  - Hemoglobin A1C; Future       Patient was told to go to the ER ASAP if you develop any chest pain, shortness of breath, facial droop, slurred speech, weakness/numbness in your arms or legs or double vision

## 2022-03-24 NOTE — PATIENT INSTRUCTIONS
Personalized Preventive Plan for Bar Villavicencio - 3/24/2022  Medicare offers a range of preventive health benefits. Some of the tests and screenings are paid in full while other may be subject to a deductible, co-insurance, and/or copay. Some of these benefits include a comprehensive review of your medical history including lifestyle, illnesses that may run in your family, and various assessments and screenings as appropriate. After reviewing your medical record and screening and assessments performed today your provider may have ordered immunizations, labs, imaging, and/or referrals for you. A list of these orders (if applicable) as well as your Preventive Care list are included within your After Visit Summary for your review. Other Preventive Recommendations:    · A preventive eye exam performed by an eye specialist is recommended every 1-2 years to screen for glaucoma; cataracts, macular degeneration, and other eye disorders. · A preventive dental visit is recommended every 6 months. · Try to get at least 150 minutes of exercise per week or 10,000 steps per day on a pedometer . · Order or download the FREE \"Exercise & Physical Activity: Your Everyday Guide\" from [employer]. Call 4-459.712.7902 or search [employer] online. · You need 0653-6404 mg of calcium and 5655-1570 IU of vitamin D per day. It is possible to meet your calcium requirement with diet alone, but a vitamin D supplement is usually necessary to meet this goal.  · When exposed to the sun, use a sunscreen that protects against both UVA and UVB radiation with an SPF of 30 or greater. Reapply every 2 to 3 hours or after sweating, drying off with a towel, or swimming. · Always wear a seat belt when traveling in a car. Always wear a helmet when riding a bicycle or motorcycle.

## 2022-03-25 LAB
ESTIMATED AVERAGE GLUCOSE: 157.1 MG/DL
HBA1C MFR BLD: 7.1 %

## 2022-04-06 RX ORDER — INSULIN GLARGINE 100 [IU]/ML
INJECTION, SOLUTION SUBCUTANEOUS
Qty: 30 ML | Refills: 3 | Status: SHIPPED | OUTPATIENT
Start: 2022-04-06

## 2022-04-06 NOTE — TELEPHONE ENCOUNTER
Medication:   Requested Prescriptions     Pending Prescriptions Disp Refills    LANTUS SOLOSTAR 100 UNIT/ML injection pen [Pharmacy Med Name: Lantus SoloStar 100 UNIT/ML Subcutaneous Solution Pen-injector] 30 mL 3     Sig: INJECT SUBCUTANEOUSLY 10  UNITS TWICE DAILY BEFORE  BREAKFAST AND EVENING MEAL       Last Filled:      Patient Phone Number: [phone] (home)     Last appt: 3/24/2022   Next appt: 6/29/2022    Last Labs DM:   Lab Results   Component Value Date    LABA1C 7.1 03/24/2022

## 2022-05-10 ENCOUNTER — OFFICE VISIT (OUTPATIENT)
Dept: ENT CLINIC | Age: 77
End: 2022-05-10
Payer: MEDICARE

## 2022-05-10 VITALS — HEART RATE: 78 BPM | SYSTOLIC BLOOD PRESSURE: 177 MMHG | DIASTOLIC BLOOD PRESSURE: 74 MMHG | TEMPERATURE: 97.1 F

## 2022-05-10 DIAGNOSIS — H90.3 SENSORINEURAL HEARING LOSS (SNHL), BILATERAL: ICD-10-CM

## 2022-05-10 DIAGNOSIS — H69.81 ETD (EUSTACHIAN TUBE DYSFUNCTION), RIGHT: Primary | ICD-10-CM

## 2022-05-10 DIAGNOSIS — J30.9 ALLERGIC RHINITIS, UNSPECIFIED SEASONALITY, UNSPECIFIED TRIGGER: ICD-10-CM

## 2022-05-10 PROCEDURE — 1036F TOBACCO NON-USER: CPT | Performed by: STUDENT IN AN ORGANIZED HEALTH CARE EDUCATION/TRAINING PROGRAM

## 2022-05-10 PROCEDURE — 99204 OFFICE O/P NEW MOD 45 MIN: CPT | Performed by: STUDENT IN AN ORGANIZED HEALTH CARE EDUCATION/TRAINING PROGRAM

## 2022-05-10 PROCEDURE — 1123F ACP DISCUSS/DSCN MKR DOCD: CPT | Performed by: STUDENT IN AN ORGANIZED HEALTH CARE EDUCATION/TRAINING PROGRAM

## 2022-05-10 PROCEDURE — G8417 CALC BMI ABV UP PARAM F/U: HCPCS | Performed by: STUDENT IN AN ORGANIZED HEALTH CARE EDUCATION/TRAINING PROGRAM

## 2022-05-10 PROCEDURE — G8427 DOCREV CUR MEDS BY ELIG CLIN: HCPCS | Performed by: STUDENT IN AN ORGANIZED HEALTH CARE EDUCATION/TRAINING PROGRAM

## 2022-05-10 PROCEDURE — 4040F PNEUMOC VAC/ADMIN/RCVD: CPT | Performed by: STUDENT IN AN ORGANIZED HEALTH CARE EDUCATION/TRAINING PROGRAM

## 2022-05-10 RX ORDER — FLUTICASONE PROPIONATE 50 MCG
2 SPRAY, SUSPENSION (ML) NASAL DAILY
Qty: 48 G | Refills: 1 | Status: SHIPPED | OUTPATIENT
Start: 2022-05-10

## 2022-05-10 NOTE — PROGRESS NOTES
[address] [facility]- HEAD & NECK SURGERY  NEW PATIENT HISTORY AND PHYSICAL NOTE      Patient Name: Latanya Dey Record Number:  [id number]  Primary Care Physician:  Maynor Gates MD    ChiefComplaint     Chief Complaint   Patient presents with    Ear Problem     ears are bothering him , had a hearing test       History of Present Illness     Mary Feldman is an 76 y.o. male presenting with hearing loss for many years, worse on the right. Gradually worsening, no recent acute hearing changes. Got a hearing aid a couple years back which seems to help. He was recently seen by his audiologist who had performed a comprehensive audiological evaluation and noted that his right eardrum was not moving appropriately. History of a right sided PE tube insertion in office for serous otitis media by Dr. John Michael on 10/24/17 - did not notice much improvement after tube placement. + chronic tinnitus, in head, non pulsatile. No otalgia. No otorrhea. No history of chronic ear infections as an adult. No history of otologic surgery other than single right sided PE tube placement. No family history of early onset hearing loss. No loud noise exposures. Denies exposure to chemotherapy. Denies vertigo or dizziness. Ears do not pop on when he attends auto insufflation.     Past Medical History     Past Medical History:   Diagnosis Date    CAD (coronary artery disease)     DVT     Hyperlipidemia     Hypertension     Stenosis     subclavical    Type II or unspecified type diabetes mellitus without mention of complication, not stated as uncontrolled        Past Surgical History     Past Surgical History:   Procedure Laterality Date    CARDIAC DEFIBRILLATOR PLACEMENT      CORONARY ARTERY BYPASS GRAFT  2004    MI Luciano Savaget INCIS Left 9/10/2018    LEFT CAROTID ENDARTERECTOMY performed by Vinayak Turner MD at [facility]       Family History Family History   Problem Relation Age of Onset    Coronary Art Dis Mother     Hypertension Mother     Coronary Art Dis Father     Hypertension Father        Social History     Social History     Tobacco Use    Smoking status: Never Smoker    Smokeless tobacco: Never Used   Vaping Use    Vaping Use: Never used   Substance Use Topics    Alcohol use: No    Drug use: No        Allergies     Allergies   Allergen Reactions    Ace Inhibitors Other (See Comments)     angioedema  Other reaction(s): angioedema/H&P    Ticlid [Ticlopidine Hydrochloride]      rash    Ticlopidine      Other reaction(s): rash/H&P       Medications     Current Outpatient Medications   Medication Sig Dispense Refill    fluticasone (FLONASE) 50 MCG/ACT nasal spray 2 sprays by Each Nostril route daily 48 g 1    LANTUS SOLOSTAR 100 UNIT/ML injection pen INJECT SUBCUTANEOUSLY 10  UNITS TWICE DAILY BEFORE  BREAKFAST AND EVENING MEAL 30 mL 3    amLODIPine (NORVASC) 5 MG tablet TAKE ONE TABLET BY MOUTH DAILY 90 tablet 1    atorvastatin (LIPITOR) 20 MG tablet TAKE 1 TABLET BY MOUTH  DAILY 90 tablet 1    insulin lispro, 1 Unit Dial, 100 UNIT/ML SOPN INJECT UNDER THE SKIN 5 UNITS DAILY BEFORE EVENING MEAL 3 mL 2    Insulin Pen Needle (ULTICARE MINI PEN NEEDLES) 31G X 6 MM MISC USE TWICE DAILY FOR INSULIN INJECTION 200 each 1    difluprednate (DUREZOL) 0.05 % EMUL instill 1 drop by ophthalmic route 2 times every day into the right eye and 8 times a day in the left eye      trimethoprim-polymyxin b (POLYTRIM) 15505-4.1 UNIT/ML-% ophthalmic solution Apply 1 drop to eye      glipiZIDE (GLUCOTROL) 5 MG tablet TAKE 1 TABLET BY MOUTH  DAILY 90 tablet 3    metoprolol succinate (TOPROL XL) 50 MG extended release tablet TAKE 1 TABLET BY MOUTH TWO  TIMES DAILY 180 tablet 3    blood glucose test strips (ACCU-CHEK BRITNEY PLUS) strip TEST TWICE DAILY FOR  DIABETES 200 strip 3    JANUVIA 100 MG tablet TAKE 1 TABLET BY MOUTH  DAILY 90 tablet 3    insulin aspart (NOVOLOG FLEXPEN) 100 UNIT/ML injection pen Before breakfast and evening meals (Patient taking differently: 5 Units Before breakfast and evening meals) 5 pen 3    latanoprost (XALATAN) 0.005 % ophthalmic solution       dorzolamide-timolol (COSOPT) 22.3-6.8 MG/ML ophthalmic solution       brimonidine (ALPHAGAN) 0.2 % ophthalmic solution       Accu-Chek FastClix Lancets MISC Test two times daily for  diabetes 204 each 3    Insulin Syringe-Needle U-100 ([employer] INSULIN SYRINGE) 31G X 5/16\" 0.5 ML MISC 1 each by Does not apply route daily Use daily for insulin adminstration 100 each 3    Blood Glucose Monitoring Suppl (ACCU-CHEK BRITNEY PLUS) W/DEVICE KIT 1 each by Does not apply route 2 times daily. TEST TWICE DAILY FOR DIABETES 1 kit 0     No current facility-administered medications for this visit. Review of Systems     REVIEW OF SYSTEMS    See HPI Above    PhysicalExam     Vitals:    05/10/22 0820   BP: (!) 177/74   Site: Right Upper Arm   Position: Sitting   Cuff Size: Medium Adult   Pulse: 78   Temp: 97.1 °F (36.2 °C)   TempSrc: Temporal       PHYSICAL EXAM  BP (!) 177/74 (Site: Right Upper Arm, Position: Sitting, Cuff Size: Medium Adult)   Pulse 78   Temp 97.1 °F (36.2 °C) (Temporal)     GENERAL: No acute distress, alert and oriented  EYES: EOMI, Anti-icteric  NOSE: On anterior rhinoscopy there is no epistaxis, nasal mucosa moist and normal appearing, no purulent drainage. EARS: Normal external appearance; on portable otomicroscopy:     -Ad: External auditory canal without stenosis, tympanic membrane diffusely thickened with heavy myringosclerosis and localized small area of monomeric tympanic membrane posteriorly inferiorly. No mobility on pneumatic otoscopy. Unable to visualize middle ear space.     -As: External auditory canal without stenosis, tympanic membrane with partial myringosclerosis otherwise intact, no middle ear effusions or retractions.    FACE: HB 1/6 bilaterally, symmetric appearing, sensation equal bilaterally  ORAL CAVITY: No masses or lesions visualized or palpated, uvula is midline, moist mucous membranes, absent tonsils  NECK: Normal range of motion, no thyromegaly, trachea is midline, no palpable lymphadenopathy or neck masses, no crepitus  NEURO: Cranial Nerves 2, 3, 4, 5, 6, 7, 11, 12 grossly intact bilaterally     I have performed a head and neck physical exam personally or was physically present during the key or critical portions of the service. Data/Imaging Review     Comprehensive audiological evaluation performed at [place] [address] on 5/5/22 was independently reviewed by myself which demonstrates: Au: Moderately to severe sensorineural hearing loss bilaterally with slight asymmetry worse on the right. WRS 72% right, WRS 84% left. Type B tympanogram right. Type As tympanogram left. Assessment and Plan     1. ETD (Eustachian tube dysfunction), right  2. Allergic rhinitis, unspecified seasonality, unspecified trigger  3. Sensorineural hearing loss (SNHL), bilateral    Plan:  She has had ear tube placement in the past by Dr. Delonte Boswell which did not improve his right ear symptoms. On examination his right ear appears diffusely thickened with heavy tympanosclerosis. Type B tympanogram may be secondary to heavy myringosclerosis versus possible effusion. We will treat with Flonase daily x2 months. He will follow-up after 2 months with repeat audiogram and we can consider placement of ear tubes at that time if he continues to have a noncompliant tympanic membrane on audiometric testing. Follow Up     Return in about 2 months (around 7/10/2022) for audiogram prior to appointment. [place] [address]  Department of Otolaryngology/Head & Neck Surgery  5/10/22    Medical Decision Making:   The following items were considered in medical decision making:  Independent review of images  Review / order clinical lab tests  Review / order radiology tests  Decision to obtain old records    This note was generated completely or in part utilizing Dragon dictation speech recognition software. Occasionally, words are mistranscribed and despite editing, the text may contain inaccuracies due to incorrect word recognition. If further clarification is needed please contact the office at [phone].

## 2022-05-17 ENCOUNTER — OFFICE VISIT (OUTPATIENT)
Dept: PRIMARY CARE CLINIC | Age: 77
End: 2022-05-17
Payer: MEDICARE

## 2022-05-17 ENCOUNTER — NURSE TRIAGE (OUTPATIENT)
Dept: OTHER | Facility: CLINIC | Age: 77
End: 2022-05-17

## 2022-05-17 VITALS
WEIGHT: 164 LBS | HEIGHT: 65 IN | SYSTOLIC BLOOD PRESSURE: 124 MMHG | DIASTOLIC BLOOD PRESSURE: 66 MMHG | HEART RATE: 70 BPM | BODY MASS INDEX: 27.32 KG/M2 | OXYGEN SATURATION: 97 %

## 2022-05-17 DIAGNOSIS — K52.9 GASTROENTERITIS: Primary | ICD-10-CM

## 2022-05-17 DIAGNOSIS — E11.9 CONTROLLED TYPE 2 DIABETES MELLITUS WITHOUT COMPLICATION, WITH LONG-TERM CURRENT USE OF INSULIN (HCC): ICD-10-CM

## 2022-05-17 DIAGNOSIS — Z79.4 CONTROLLED TYPE 2 DIABETES MELLITUS WITHOUT COMPLICATION, WITH LONG-TERM CURRENT USE OF INSULIN (HCC): ICD-10-CM

## 2022-05-17 DIAGNOSIS — R11.2 NON-INTRACTABLE VOMITING WITH NAUSEA, UNSPECIFIED VOMITING TYPE: ICD-10-CM

## 2022-05-17 LAB
CHP ED QC CHECK: NORMAL
GLUCOSE BLD-MCNC: 266 MG/DL

## 2022-05-17 PROCEDURE — 99214 OFFICE O/P EST MOD 30 MIN: CPT | Performed by: STUDENT IN AN ORGANIZED HEALTH CARE EDUCATION/TRAINING PROGRAM

## 2022-05-17 PROCEDURE — 4040F PNEUMOC VAC/ADMIN/RCVD: CPT | Performed by: STUDENT IN AN ORGANIZED HEALTH CARE EDUCATION/TRAINING PROGRAM

## 2022-05-17 PROCEDURE — 3051F HG A1C>EQUAL 7.0%<8.0%: CPT | Performed by: STUDENT IN AN ORGANIZED HEALTH CARE EDUCATION/TRAINING PROGRAM

## 2022-05-17 PROCEDURE — 1123F ACP DISCUSS/DSCN MKR DOCD: CPT | Performed by: STUDENT IN AN ORGANIZED HEALTH CARE EDUCATION/TRAINING PROGRAM

## 2022-05-17 PROCEDURE — 82962 GLUCOSE BLOOD TEST: CPT | Performed by: STUDENT IN AN ORGANIZED HEALTH CARE EDUCATION/TRAINING PROGRAM

## 2022-05-17 PROCEDURE — G8427 DOCREV CUR MEDS BY ELIG CLIN: HCPCS | Performed by: STUDENT IN AN ORGANIZED HEALTH CARE EDUCATION/TRAINING PROGRAM

## 2022-05-17 PROCEDURE — 1036F TOBACCO NON-USER: CPT | Performed by: STUDENT IN AN ORGANIZED HEALTH CARE EDUCATION/TRAINING PROGRAM

## 2022-05-17 PROCEDURE — G8417 CALC BMI ABV UP PARAM F/U: HCPCS | Performed by: STUDENT IN AN ORGANIZED HEALTH CARE EDUCATION/TRAINING PROGRAM

## 2022-05-17 RX ORDER — ONDANSETRON 4 MG/1
4 TABLET, FILM COATED ORAL 3 TIMES DAILY PRN
Qty: 30 TABLET | Refills: 0 | Status: SHIPPED | OUTPATIENT
Start: 2022-05-17

## 2022-05-17 ASSESSMENT — ENCOUNTER SYMPTOMS
VOMITING: 1
DIARRHEA: 0
ABDOMINAL PAIN: 0
COUGH: 0
CHEST TIGHTNESS: 0

## 2022-05-17 NOTE — PATIENT INSTRUCTIONS
Patient Education        Gastroenteritis: Care Instructions  Overview     Gastroenteritis is an illness that may cause nausea, vomiting, and diarrhea. Itcan be caused by bacteria or a virus. You will probably begin to feel better in 1 to 2 days. In the meantime, get plenty of rest and make sure you do not become dehydrated. Dehydration occurswhen your body loses too much fluid. Follow-up care is a key part of your treatment and safety. Be sure to make and go to all appointments, and call your doctor if you are having problems. It's also a good idea to know your test results and keep alist of the medicines you take. How can you care for yourself at home?  If your doctor prescribed antibiotics, take them as directed. Do not stop taking them just because you feel better. You need to take the full course of antibiotics.  Drink plenty of fluids to prevent dehydration. Choose water and other clear liquids until you feel better. If you have kidney, heart, or liver disease and have to limit fluids, talk with your doctor before you increase your fluid intake.  Drink fluids slowly, in frequent, small amounts, because drinking too much too fast can cause vomiting.  Begin eating mild foods, such as dry toast, yogurt, applesauce, bananas, and rice. Avoid spicy, hot, or high-fat foods, and do not drink alcohol or caffeine for a day or two. Do not drink milk or eat ice cream until you are feeling better. How to prevent gastroenteritis   Keep hot foods hot and cold foods cold.  Do not eat meats, dressings, salads, or other foods that have been kept at room temperature for more than 2 hours.  Use a thermometer to check your refrigerator. It should be between 34°F and 40°F.   Defrost meats in the refrigerator or microwave, not on the kitchen counter.  Keep your hands and your kitchen clean. Wash your hands, cutting boards, and countertops with hot soapy water frequently.  Cook meat until it is well done.    Do not eat raw eggs or uncooked sauces made with raw eggs.  Do not take chances. If food looks or tastes spoiled, throw it out. When should you call for help? Call 911 anytime you think you may need emergency care. For example, call if:     You vomit blood or what looks like coffee grounds.      You passed out (lost consciousness).      You pass maroon or very bloody stools. Call your doctor now or seek immediate medical care if:     You have severe belly pain.      You have signs of needing more fluids. You have sunken eyes, a dry mouth, and pass only a little urine.      You feel like you are going to faint.      You have increased belly pain that does not go away in 1 to 2 days.      You have new or increased nausea, or you are vomiting.      You have a new or higher fever.      Your stools are black and tarlike or have streaks of blood. Watch closely for changes in your health, and be sure to contact your doctor if:     You are dizzy or lightheaded.      You urinate less than usual, or your urine is dark yellow or brown.      You do not feel better with each day that goes by. Where can you learn more? Go to https://Aton[employer]s.Spot formerly PlacePop. org and sign in to your [employer] account. Enter N142 in the [facility] box to learn more about \"Gastroenteritis: Care Instructions. \"     If you do not have an account, please click on the \"Sign Up Now\" link. Current as of: July 1, 2021               Content Version: 13.2  © 2006-2022 [employer]wise, Incorporated. Care instructions adapted under license by [facility] ([facility]). If you have questions about a medical condition or this instruction, always ask your healthcare professional. [place] [address] any warranty or liability for your use of this information.

## 2022-05-17 NOTE — PROGRESS NOTES
Sapphire Sheikh (:  1945) is a 76 y.o. male,Established patient, here for evaluation of the following chief complaint(s):  Emesis (since last night, last vomitted at 9am, has eaten 3 cracker and sprite)      Patient presents today for same-day visit with complaints of vomiting. Emesis   This is a new problem. The current episode started today (started around 12AM). Episode frequency: almost every hour. Emesis appearance: clear liquid, NBNB. There has been no fever. Pertinent negatives include no abdominal pain, chest pain, coughing, diarrhea, fever, myalgias or URI. Treatments tried: soda with herb. The treatment provided no relief. Initially he was not able to eat or drink, because food would not go down. Afterwards, he took some Prilosec and was able to tolerate some crackers and sprite drink. Patient has history of diabetes type 2, however it is controlled and his home blood sugars are rarely above 200. In the office blood glucose was 266, and patient reports that this is because he did not take any of his diabetes medications this morning. ASSESSMENT/PLAN:  1. Gastroenteritis  -     ondansetron (ZOFRAN) 4 MG tablet; Take 1 tablet by mouth 3 times daily as needed for Nausea or Vomiting, Disp-30 tablet, R-0Normal  2. Non-intractable vomiting with nausea, unspecified vomiting type  -     ondansetron (ZOFRAN) 4 MG tablet; Take 1 tablet by mouth 3 times daily as needed for Nausea or Vomiting, Disp-30 tablet, R-0Normal  3. Controlled type 2 diabetes mellitus without complication, with long-term current use of insulin ([facility])  -     POCT Glucose  Patient's symptoms are likely from viral gastroenteritis. Other potential causes include food poisoning vs GERD/gastritis vs bowel obstruction (though unlikely). Patient recommended to take antiemetic as needed. He may also take Prilosec as needed for the next few days.   If symptoms do not improve in the next 3-5 days, he should contact the office for further evaluation. He was recommended to be careful with Premeal insulin and glipizide, as they can cause hypoglycemic episodes especially in the setting of decreased p.o. intake. Return if symptoms worsen or fail to improve. SUBJECTIVE/OBJECTIVE:    Review of Systems   Constitutional: Negative for fever. HENT: Negative for congestion. Respiratory: Negative for cough and chest tightness. Cardiovascular: Negative for chest pain. Gastrointestinal: Positive for vomiting. Negative for abdominal pain and diarrhea. Musculoskeletal: Negative for myalgias. Vitals: /66   Pulse 70   Ht 5' 5\" (1.651 m)   Wt 164 lb (74.4 kg)   SpO2 97%   BMI 27.29 kg/m²   Physical Exam  Constitutional:       Appearance: Normal appearance. HENT:      Mouth/Throat:      Mouth: Mucous membranes are moist.      Pharynx: Oropharynx is clear. Eyes:      Conjunctiva/sclera: Conjunctivae normal.   Cardiovascular:      Rate and Rhythm: Normal rate and regular rhythm. Pulses: Normal pulses. Heart sounds: Normal heart sounds. Pulmonary:      Effort: Pulmonary effort is normal.      Breath sounds: Normal breath sounds. Abdominal:      General: Bowel sounds are normal. There is no distension. Palpations: There is no mass. Tenderness: There is no abdominal tenderness. There is no guarding. Musculoskeletal:         General: No swelling or tenderness. Neurological:      Mental Status: He is alert and oriented to person, place, and time. Psychiatric:         Mood and Affect: Mood normal.         Behavior: Behavior normal.       I spent a total of 30 minutes on the day of the visit. Results for POC orders placed in visit on 05/17/22   POCT Glucose   Result Value Ref Range    Glucose 266 mg/dL    QC OK? An electronic signature was used to authenticate this note.     Electronically signed by Eleni Tejeda MD on 5/17/2022 at 12:59 PM     This dictation was generated by voice recognition computer software. Although all attempts are made to edit the dictation for accuracy, there may be errors in the transcription that are not intended.

## 2022-06-20 NOTE — TELEPHONE ENCOUNTER
Medication:   Requested Prescriptions     Pending Prescriptions Disp Refills    insulin aspart (NOVOLOG FLEXPEN) 100 UNIT/ML injection pen 5 pen 3     Sig: Before breakfast and evening meals       Last Filled:  02/02/2021     Patient Phone Number: [phone] (home)     Last appt: 5/17/2022   Next appt: 6/29/2022    Last Labs DM:   Lab Results   Component Value Date    LABA1C 7.1 03/24/2022

## 2022-06-21 ENCOUNTER — TELEPHONE (OUTPATIENT)
Dept: PRIMARY CARE CLINIC | Age: 77
End: 2022-06-21

## 2022-06-21 RX ORDER — INSULIN LISPRO 100 [IU]/ML
INJECTION, SOLUTION INTRAVENOUS; SUBCUTANEOUS
Qty: 3 ML | Refills: 2 | Status: SHIPPED | OUTPATIENT
Start: 2022-06-21

## 2022-06-21 RX ORDER — INSULIN ASPART 100 [IU]/ML
5 INJECTION, SOLUTION INTRAVENOUS; SUBCUTANEOUS
Qty: 5 PEN | Refills: 1 | Status: SHIPPED | OUTPATIENT
Start: 2022-06-21 | End: 2022-07-27 | Stop reason: SDUPTHER

## 2022-06-21 NOTE — TELEPHONE ENCOUNTER
Pt requested wrong medication, I have discontinued in [address] to send?     Insulin Lispro 1 Unit Dial, 100UNIT/ML SOPN      Please advise

## 2022-06-21 NOTE — TELEPHONE ENCOUNTER
PT called stated he requested the wrong medication. Disregard. Correct order routed to Dr. Chantel Ramsey.

## 2022-06-21 NOTE — TELEPHONE ENCOUNTER
Submitted PA for Insulin Aspart FlexPen 100UNIT/ML pen-injectors. Key: [id number]. Via CMM STATUS: DENIED. Letter attached. If this requires a response please respond to the pool ( P [facility] [address] [facility]). Thank you please advise patient.

## 2022-06-21 NOTE — TELEPHONE ENCOUNTER
Pt is stating that wrong rx was sent to [facility], that it should have been insulin lispro, 1 Unit Dial, 100 UNIT/ML SOPN       Pt requests :    Last office visit 5/17/2022     Last written 2/15/2022     Next office visit scheduled 6/21/2022    Requested Prescriptions     Pending Prescriptions Disp Refills    insulin lispro, 1 Unit Dial, 100 UNIT/ML SOPN 3 mL 2     Signed Prescriptions Disp Refills    insulin aspart (NOVOLOG FLEXPEN) 100 UNIT/ML injection pen 5 pen 1     Sig: Inject 5 Units into the skin 2 times daily (before meals) Before breakfast and evening meals     Authorizing Provider: Nancy Laird, [address] [place] [address] at [place] is correct pharmacy. If any questions, contact pt at [phone].

## 2022-07-03 DIAGNOSIS — Z79.4 CONTROLLED TYPE 2 DIABETES MELLITUS WITHOUT COMPLICATION, WITH LONG-TERM CURRENT USE OF INSULIN (HCC): ICD-10-CM

## 2022-07-03 DIAGNOSIS — E11.9 CONTROLLED TYPE 2 DIABETES MELLITUS WITHOUT COMPLICATION, WITH LONG-TERM CURRENT USE OF INSULIN (HCC): ICD-10-CM

## 2022-07-05 RX ORDER — BLOOD SUGAR DIAGNOSTIC
STRIP MISCELLANEOUS
Qty: 200 STRIP | Refills: 3 | OUTPATIENT
Start: 2022-07-05

## 2022-07-08 ENCOUNTER — TELEPHONE (OUTPATIENT)
Dept: PRIMARY CARE CLINIC | Age: 77
End: 2022-07-08

## 2022-07-08 NOTE — TELEPHONE ENCOUNTER
Pt's daughter called and stated that pt tested positive for Covid and wanted to know if there was a medicine that can be sent to the pharmacy.

## 2022-07-08 NOTE — TELEPHONE ENCOUNTER
I contacted patient's daughter and she reports that he tested positive this morning. Symptoms started last night with scratchy throat and then progressed to fever, cough. Patient wanted to consider taking Paxlovid. Though he meets the requirements for the medication, he is on Eliquis, for which there is a warning when co-administered with Paxlovid (CYP inhibitor). Supportive care was therefore recommended.     Horace Dean MD

## 2022-07-11 RX ORDER — PEN NEEDLE, DIABETIC 29 G X1/2"
NEEDLE, DISPOSABLE MISCELLANEOUS
Qty: 200 EACH | Refills: 1 | Status: SHIPPED | OUTPATIENT
Start: 2022-07-11

## 2022-07-11 NOTE — TELEPHONE ENCOUNTER
Medication:   Requested Prescriptions     Pending Prescriptions Disp Refills    ULTICARE MINI PEN NEEDLES 31G X 6 MM [address] [facility] [Pharmacy Med Name: Dwight Leavitt 200 each 1     Sig: USE TWICE DAILY FOR INSULIN INJECTION       Last Filled:  03/10/2021    Patient Phone Number: [phone] (home)     Last appt: 5/17/2022   Next appt: 7/14/2022    Last Labs DM:   Lab Results   Component Value Date/Time    LABA1C 7.1 03/24/2022 11:30 AM

## 2022-07-27 ENCOUNTER — OFFICE VISIT (OUTPATIENT)
Dept: PRIMARY CARE CLINIC | Age: 77
End: 2022-07-27
Payer: COMMERCIAL

## 2022-07-27 ENCOUNTER — HOSPITAL ENCOUNTER (OUTPATIENT)
Dept: GENERAL RADIOLOGY | Age: 77
Discharge: HOME | End: 2022-07-27
Payer: COMMERCIAL

## 2022-07-27 VITALS
OXYGEN SATURATION: 96 % | SYSTOLIC BLOOD PRESSURE: 132 MMHG | DIASTOLIC BLOOD PRESSURE: 68 MMHG | TEMPERATURE: 97.2 F | WEIGHT: 166 LBS | HEART RATE: 91 BPM | HEIGHT: 65 IN | RESPIRATION RATE: 16 BRPM | BODY MASS INDEX: 27.66 KG/M2

## 2022-07-27 DIAGNOSIS — I48.0 INTERMITTENT ATRIAL FIBRILLATION (HCC): ICD-10-CM

## 2022-07-27 DIAGNOSIS — Z79.4 CONTROLLED TYPE 2 DIABETES MELLITUS WITHOUT COMPLICATION, WITH LONG-TERM CURRENT USE OF INSULIN (HCC): ICD-10-CM

## 2022-07-27 DIAGNOSIS — I10 PRIMARY HYPERTENSION: Primary | ICD-10-CM

## 2022-07-27 DIAGNOSIS — I25.10 ATHEROSCLEROSIS OF NATIVE CORONARY ARTERY OF NATIVE HEART WITHOUT ANGINA PECTORIS: ICD-10-CM

## 2022-07-27 DIAGNOSIS — E11.9 CONTROLLED TYPE 2 DIABETES MELLITUS WITHOUT COMPLICATION, WITH LONG-TERM CURRENT USE OF INSULIN (HCC): ICD-10-CM

## 2022-07-27 DIAGNOSIS — S16.1XXA ACUTE STRAIN OF NECK MUSCLE, INITIAL ENCOUNTER: ICD-10-CM

## 2022-07-27 PROCEDURE — G8427 DOCREV CUR MEDS BY ELIG CLIN: HCPCS | Performed by: FAMILY MEDICINE

## 2022-07-27 PROCEDURE — 72050 X-RAY EXAM NECK SPINE 4/5VWS: CPT

## 2022-07-27 PROCEDURE — 1123F ACP DISCUSS/DSCN MKR DOCD: CPT | Performed by: FAMILY MEDICINE

## 2022-07-27 PROCEDURE — 1036F TOBACCO NON-USER: CPT | Performed by: FAMILY MEDICINE

## 2022-07-27 PROCEDURE — 3051F HG A1C>EQUAL 7.0%<8.0%: CPT | Performed by: FAMILY MEDICINE

## 2022-07-27 PROCEDURE — 99214 OFFICE O/P EST MOD 30 MIN: CPT | Performed by: FAMILY MEDICINE

## 2022-07-27 PROCEDURE — G8417 CALC BMI ABV UP PARAM F/U: HCPCS | Performed by: FAMILY MEDICINE

## 2022-07-27 RX ORDER — NAPROXEN 500 MG/1
500 TABLET ORAL 2 TIMES DAILY WITH MEALS
Qty: 10 TABLET | Refills: 0 | Status: SHIPPED | OUTPATIENT
Start: 2022-07-27 | End: 2022-08-01

## 2022-07-27 RX ORDER — INSULIN ASPART 100 [IU]/ML
5 INJECTION, SOLUTION INTRAVENOUS; SUBCUTANEOUS
Qty: 5 PEN | Refills: 1 | Status: SHIPPED
Start: 2022-07-27 | End: 2022-07-28

## 2022-07-27 ASSESSMENT — ENCOUNTER SYMPTOMS
ABDOMINAL PAIN: 0
SORE THROAT: 0
COUGH: 0
VOMITING: 0
SHORTNESS OF BREATH: 0
BLOOD IN STOOL: 0
NAUSEA: 0

## 2022-07-27 NOTE — PATIENT INSTRUCTIONS
Go to the ER ASAP if you develop any chest pain, shortness of breath, facial droop, slurred speech, weakness/numbness in your arms or legs or double vision!

## 2022-07-27 NOTE — PROGRESS NOTES
132/68   Pulse: 91   Resp: 16   Temp: 97.2 °F (36.2 °C)   SpO2: 96%         Physical Exam  Vitals reviewed. Constitutional:       General: He is not in acute distress. HENT:      Mouth/Throat:      Mouth: Mucous membranes are moist.      Pharynx: Oropharynx is clear. Eyes:      General: No scleral icterus. Comments: Pink conjunctivae    Neck:      Thyroid: No thyromegaly. Comments: Positive R carotid bruit noted; nontender cervical spine with tendernous to palpation noted over R paracervical musculature  Cardiovascular:      Heart sounds: Normal heart sounds. No murmur heard. No friction rub. No gallop. Pulmonary:      Effort: Pulmonary effort is normal.      Breath sounds: Normal breath sounds. Abdominal:      Palpations: Abdomen is soft. Tenderness: There is no abdominal tenderness. Musculoskeletal:      Comments: Positive  2 -3+ leg edema but no calf tendernous to palpation noted B/L; Positive thenar eminence wasting noted in R hand    Lymphadenopathy:      Cervical: No cervical adenopathy. Skin:     Findings: No rash. Neurological:      Mental Status: He is alert. Comments: Cranial nerves 2 - 12 grossly intact; Muscle strength 5/5 throughout   Psychiatric:         Mood and Affect: Mood normal.       ASSESSMENT AND PLAN    1. Primary hypertension  Patient clinically stable on present medications and had recent normal CMP bloodwork and was told to follow a low sodium diet and to keep his legs elevated as much as possible given his noted leg edema on PE    2. Atherosclerosis of native coronary artery of native heart without angina pectoris  Patient clinically stable on present medications and denies any CP or SOB    3. Intermittent atrial fibrillation (HCC)  Pt is followed by Cardiology and has a pacemaker in place and is being anticoagulated with eliquis medication    4.  Controlled type 2 diabetes mellitus without complication, with long-term current use of insulin [facility])  Patient clinically stable on present medications having a recent excellent HgBA1c level 7.1   - insulin aspart (NOVOLOG FLEXPEN) 100 UNIT/ML injection pen; Inject 5 Units into the skin in the morning and 5 Units in the evening. Inject before meals. Before breakfast and evening meals. Dispense: 5 pen; Refill: 1    5. Acute strain of neck muscle, initial encounter  Will check xray cervical spine and treat pt with an NSAID and patient was told to notify MD if pain persists despite this tx   - naproxen (NAPROSYN) 500 MG tablet; Take 1 tablet by mouth in the morning and 1 tablet in the evening. Take with meals. Do all this for 5 days. Dispense: 10 tablet; Refill: 0  - XR CERVICAL SPINE (4-5 VIEWS); Future      Adrianne Ibarra MD      Return in about 3 months (around 10/27/2022). Patient Instructions     Go to the ER ASAP if you develop any chest pain, shortness of breath, facial droop, slurred speech, weakness/numbness in your arms or legs or double vision!

## 2022-07-28 DIAGNOSIS — E11.9 CONTROLLED TYPE 2 DIABETES MELLITUS WITHOUT COMPLICATION, WITH LONG-TERM CURRENT USE OF INSULIN (HCC): Primary | ICD-10-CM

## 2022-07-28 DIAGNOSIS — Z79.4 CONTROLLED TYPE 2 DIABETES MELLITUS WITHOUT COMPLICATION, WITH LONG-TERM CURRENT USE OF INSULIN (HCC): Primary | ICD-10-CM

## 2022-07-28 RX ORDER — INSULIN LISPRO 100 [IU]/ML
5 INJECTION, SOLUTION INTRAVENOUS; SUBCUTANEOUS 2 TIMES DAILY
Qty: 1 PEN | Refills: 2 | Status: CANCELLED | OUTPATIENT
Start: 2022-07-28

## 2022-08-01 ENCOUNTER — NURSE TRIAGE (OUTPATIENT)
Dept: OTHER | Facility: CLINIC | Age: 77
End: 2022-08-01

## 2022-08-02 NOTE — TELEPHONE ENCOUNTER
Patient has already been scheduled for an office visit. This was handled as a transfer from Nurse Triage.

## 2022-08-04 ENCOUNTER — OFFICE VISIT (OUTPATIENT)
Dept: PRIMARY CARE CLINIC | Age: 77
End: 2022-08-04
Payer: COMMERCIAL

## 2022-08-04 VITALS
WEIGHT: 165 LBS | BODY MASS INDEX: 27.49 KG/M2 | RESPIRATION RATE: 16 BRPM | SYSTOLIC BLOOD PRESSURE: 132 MMHG | OXYGEN SATURATION: 96 % | HEART RATE: 67 BPM | TEMPERATURE: 98 F | DIASTOLIC BLOOD PRESSURE: 80 MMHG | HEIGHT: 65 IN

## 2022-08-04 DIAGNOSIS — R20.2 PARESTHESIA OF RIGHT ARM: ICD-10-CM

## 2022-08-04 DIAGNOSIS — M54.2 NECK PAIN: Primary | ICD-10-CM

## 2022-08-04 DIAGNOSIS — R21 RASH: ICD-10-CM

## 2022-08-04 PROCEDURE — G8417 CALC BMI ABV UP PARAM F/U: HCPCS | Performed by: FAMILY MEDICINE

## 2022-08-04 PROCEDURE — 1036F TOBACCO NON-USER: CPT | Performed by: FAMILY MEDICINE

## 2022-08-04 PROCEDURE — 1123F ACP DISCUSS/DSCN MKR DOCD: CPT | Performed by: FAMILY MEDICINE

## 2022-08-04 PROCEDURE — 99214 OFFICE O/P EST MOD 30 MIN: CPT | Performed by: FAMILY MEDICINE

## 2022-08-04 PROCEDURE — G8427 DOCREV CUR MEDS BY ELIG CLIN: HCPCS | Performed by: FAMILY MEDICINE

## 2022-08-04 RX ORDER — METHYLPREDNISOLONE 4 MG/1
TABLET ORAL
Qty: 1 KIT | Refills: 0 | Status: SHIPPED | OUTPATIENT
Start: 2022-08-04 | End: 2022-08-10

## 2022-08-04 ASSESSMENT — ENCOUNTER SYMPTOMS
ABDOMINAL PAIN: 0
SORE THROAT: 0
COUGH: 0
SHORTNESS OF BREATH: 0
VOMITING: 0
NAUSEA: 0

## 2022-08-04 NOTE — PROGRESS NOTES
Chief Complaint   Patient presents with    Leg Swelling     C/o b/l lower leg edema with red spots that itch    Neck Pain     C/o neck pain and tingling         Canerick Yousif is a 76 y.o. male who presents complaining of a pruritic rash on his arms and legs x 1 month. Pt denies any new medications or new soaps/laundry detergents. Pt was exposed to poison ivy about 1 month ago which he treated with OTC creams    Patient did recent cervical spine xray which showed mild degenerative disc disease at C4-5 and C5-6 and patient needs to notify MD if his neck pain persists despite treatment with naprosyn medication --- Pt reports temporary pain relief with Rx naprosyn medication    Pt did have recent COVID infection with fever and cough 2 weeks ago which has resolved     Pt has a hx of diabetes and is on glucotrol, januvia and lantus insulin 10 units BID     Patient has a past medical history significant for CAD s/p CABG 2004, HTN and A fib s/p ablation 08/2021 and is followed by Cardiology and is on norvasc, toprol, lipitor and eliquis medications and had recent cardiac stress test which was normal     Pt has a hx of carotid stenosis s/p L CEA     Pt denies any hx of stroke, CHF or kidney disease     Pt reports a normal colonoscopy about 5 year sago     Pt is a nonsmoker and denies any alcohol use     Pt did complete his COVID vaccine series       Review of Systems   Constitutional:  Negative for fever. HENT:  Negative for nosebleeds and sore throat. Eyes:         Negative blurred vision or diplopia   Respiratory:  Negative for cough and shortness of breath. Cardiovascular:  Positive for leg swelling (at times). Negative for chest pain and palpitations. Gastrointestinal:  Negative for abdominal pain, nausea and vomiting. Genitourinary:  Negative for dysuria and hematuria. Negative hesitancy    Musculoskeletal:  Positive for neck pain (R lateral at times). Negative for arthralgias.    Skin:  Positive for rash (mildly pruritic on legs). Neurological:  Positive for headaches (at times). Negative for dizziness, seizures, syncope, speech difficulty and weakness. Positive paresthesias RUE at times    Psychiatric/Behavioral:  Negative for dysphoric mood. The patient is not nervous/anxious. Vitals:    08/04/22 1103   BP: 132/80   Pulse: 67   Resp: 16   Temp: 98 °F (36.7 °C)   SpO2: 96%         Physical Exam  Vitals reviewed. Constitutional:       General: He is not in acute distress. HENT:      Mouth/Throat:      Mouth: Mucous membranes are moist.      Pharynx: Oropharynx is clear. Eyes:      General: No scleral icterus. Comments: Pink conjunctivae    Neck:      Thyroid: No thyromegaly. Comments: Nontender cervical spine to palpation; positive tendernous to palpation noted over R paracervical musculature  Cardiovascular:      Heart sounds: Normal heart sounds. No murmur heard. No friction rub. No gallop. Pulmonary:      Effort: Pulmonary effort is normal.      Breath sounds: Normal breath sounds. Abdominal:      Palpations: Abdomen is soft. Tenderness: There is no abdominal tenderness. Musculoskeletal:      Comments: Positive  4+ leg edema noted B/L   Lymphadenopathy:      Cervical: No cervical adenopathy. Skin:     Findings: Rash (multiple scabbed papules noted on LLE and nonblanchable red macules noted on RLE) present. Neurological:      Mental Status: He is alert. Comments: Muscle strength 5/5 in upper extremities with good  strength B/L   Psychiatric:         Mood and Affect: Mood normal.       ASSESSMENT AND PLAN    1. Neck painParesthesia of right arm  Pt reports temporary relief with Rx naprosyn and given the R thenar eminence wasting noted on PE concern is for a pinched nerve as cause and will treat pt with steroids and check a CT scan cervical spine for further evaluation   - methylPREDNISolone (MEDROL DOSEPACK) 4 MG tablet;  Take by mouth as directed Dispense: 1 kit; Refill: 0  - CT CERVICAL SPINE W WO CONTRAST; Future    3. Rash  Most likely is a residual rash from pt's recent exposure to poison ivy and will treat pt with a medrol dose pack and patient was told to notify MD if rash does not resolve with tx  - methylPREDNISolone (MEDROL DOSEPACK) 4 MG tablet; Take by mouth as directed  Dispense: 1 kit; Refill: Ella Lange MD      Return in about 2 months (around 10/4/2022).

## 2022-08-16 ENCOUNTER — HOSPITAL ENCOUNTER (OUTPATIENT)
Dept: CT IMAGING | Age: 77
Discharge: HOME | End: 2022-08-16
Payer: COMMERCIAL

## 2022-08-16 ENCOUNTER — OFFICE VISIT (OUTPATIENT)
Dept: ENT CLINIC | Age: 77
End: 2022-08-16
Payer: COMMERCIAL

## 2022-08-16 ENCOUNTER — OFFICE VISIT (OUTPATIENT)
Dept: AUDIOLOGY | Age: 77
End: 2022-08-16
Payer: COMMERCIAL

## 2022-08-16 VITALS — SYSTOLIC BLOOD PRESSURE: 162 MMHG | HEART RATE: 76 BPM | TEMPERATURE: 97.5 F | DIASTOLIC BLOOD PRESSURE: 69 MMHG

## 2022-08-16 DIAGNOSIS — H74.8X1 TYPE B TYMPANOGRAM, RIGHT: Primary | ICD-10-CM

## 2022-08-16 DIAGNOSIS — H90.6 MIXED CONDUCTIVE AND SENSORINEURAL HEARING LOSS OF BOTH EARS: Primary | ICD-10-CM

## 2022-08-16 DIAGNOSIS — H90.6 MIXED CONDUCTIVE AND SENSORINEURAL HEARING LOSS OF BOTH EARS: ICD-10-CM

## 2022-08-16 DIAGNOSIS — R20.2 PARESTHESIA OF RIGHT ARM: ICD-10-CM

## 2022-08-16 DIAGNOSIS — M54.2 NECK PAIN: ICD-10-CM

## 2022-08-16 DIAGNOSIS — Z96.22 HISTORY OF PLACEMENT OF EAR TUBES: ICD-10-CM

## 2022-08-16 DIAGNOSIS — H69.81 DYSFUNCTION OF RIGHT EUSTACHIAN TUBE: ICD-10-CM

## 2022-08-16 DIAGNOSIS — H74.01 TYMPANOSCLEROSIS OF RIGHT EAR: ICD-10-CM

## 2022-08-16 LAB
CREAT SERPL-MCNC: 0.9 MG/DL (ref 0.8–1.3)
GFR AFRICAN AMERICAN: >60
GFR NON-AFRICAN AMERICAN: >60

## 2022-08-16 PROCEDURE — 92567 TYMPANOMETRY: CPT | Performed by: AUDIOLOGIST

## 2022-08-16 PROCEDURE — 1036F TOBACCO NON-USER: CPT | Performed by: STUDENT IN AN ORGANIZED HEALTH CARE EDUCATION/TRAINING PROGRAM

## 2022-08-16 PROCEDURE — 82565 ASSAY OF CREATININE: CPT

## 2022-08-16 PROCEDURE — 6360000004 HC RX CONTRAST MEDICATION: Performed by: FAMILY MEDICINE

## 2022-08-16 PROCEDURE — 72126 CT NECK SPINE W/DYE: CPT

## 2022-08-16 PROCEDURE — 92557 COMPREHENSIVE HEARING TEST: CPT | Performed by: AUDIOLOGIST

## 2022-08-16 PROCEDURE — 99214 OFFICE O/P EST MOD 30 MIN: CPT | Performed by: STUDENT IN AN ORGANIZED HEALTH CARE EDUCATION/TRAINING PROGRAM

## 2022-08-16 PROCEDURE — G8417 CALC BMI ABV UP PARAM F/U: HCPCS | Performed by: STUDENT IN AN ORGANIZED HEALTH CARE EDUCATION/TRAINING PROGRAM

## 2022-08-16 PROCEDURE — G8427 DOCREV CUR MEDS BY ELIG CLIN: HCPCS | Performed by: STUDENT IN AN ORGANIZED HEALTH CARE EDUCATION/TRAINING PROGRAM

## 2022-08-16 PROCEDURE — 1123F ACP DISCUSS/DSCN MKR DOCD: CPT | Performed by: STUDENT IN AN ORGANIZED HEALTH CARE EDUCATION/TRAINING PROGRAM

## 2022-08-16 PROCEDURE — 36415 COLL VENOUS BLD VENIPUNCTURE: CPT

## 2022-08-16 RX ADMIN — IOPAMIDOL 50 ML: 755 INJECTION, SOLUTION INTRAVENOUS at 12:00

## 2022-08-16 NOTE — PROGRESS NOTES
[facility]    8/16/2022     Patient name: Vince Jaquez  Primary Care Physician: Debbie Coffey MD   Medical Record Number: [id number]     Vince Jaquez   1945, 76 y.o. male   [id number]       Referring Provider: Tad Pelletier DO  Referral Type: In an order in [address] [place] [address]    Reason for Visit: Evaluation of the cause of disorders of hearing, tinnitus, or balance. ADULT AUDIOLOGIC EVALUATION                    Vince Jaquez is a 76 y.o. male seen today, 8/16/2022 , for a same-day comprehensive audiologic evaluation. Patient was seen by Tad Pelletier DO following today's evaluation. AUDIOLOGIC AND OTHER PERTINENT MEDICAL HISTORY:      Vince Jaquez presents with bilateral middle ear dysfunction. User of bilateral amplification. Referred to our facility by AuD provider. IMPRESSIONS:      Today's results are consistent with bilateral mixed hearing loss with good word recognition for both ears, and evidence of middle ear dysfunction. Hearing loss is significant enough to result in difficulty understanding speech in most listening environments. Patient to follow medical recommendations per  Tad Pelletier DO.    ASSESSMENT AND FINDINGS:     Otoscopy revealed: Remarkable appearance of  tympanic membrane bilaterally    Reliability: Good   Transducer: Inserts    RIGHT EAR:  Hearing Sensitivity: Moderate to severe mixed hearing loss; 15-20 dB air-bone gaps  Speech Recognition Threshold: 65 dB HL  Word Recognition: Good (80-89%), based on NU-6   Tympanometry: Flat, no peak pressure or compliance, Type B tympanogram, with typical ear canal volume, consistent with middle ear involvement.   Acoustic Reflexes: Ipsilateral: Absent     LEFT EAR:  Hearing Sensitivity: Moderate to severe mixed hearing loss; 15-25 dB air-bone gaps  Speech Recognition Threshold: 55 dB HL  Word Recognition: Good (80-89%), based on NU-6   Tympanometry: Could not maintain seal  Acoustic Reflexes: Ipsilateral: Could not maintain seal          Prior Audiogram 5/10/22    COUNSELING:      Reviewed purpose of testing completed today, general auditory system function, and results obtained today. The following items are recommended based on patient report and results from today's appointment:   - Continue medical follow-up with Sophia Jain DO.   - Retest hearing as medically indicated and/or sooner if a change in hearing is noted. Chart CC'd to: Sophia Jain DO      Degree of   Hearing Sensitivity dB Range   Within Normal Limits (WNL) 0 - 20   Mild 20 - 40   Moderate 40 - 55   Moderately-Severe 55 - 70   Severe 70 - 90   Profound 90 +        RECOMMENDATIONS:        Sophia Jain DO to medically advise.     Ayala Ceballos  Audiologist    Electronically signed by Ayala Ceballos on 8/16/22 at 9:55 AM.

## 2022-08-16 NOTE — PROGRESS NOTES
[address] VISIT      Patient Name: Latanya Dey Record Number:  [id number]  Primary Care Physician:  Richelle Kayser, MD    ChiefComplaint     Chief Complaint   Patient presents with    Hearing Problem     Review hearing eval,        History of Present Illness     Camila Carranza is an 76 y.o. male previously seen for right eustachian tube dysfunction, allergic rhinitis, bilateral sensorineural hearing loss. Interval History:   No change in hearing. Has been using Flonase as instructed which seems to have opened up his nose somewhat but has not noticed any change in his ear symptoms.     Past Medical History     Past Medical History:   Diagnosis Date    CAD (coronary artery disease)     DVT     Hyperlipidemia     Hypertension     Stenosis     subclavical    Type II or unspecified type diabetes mellitus without mention of complication, not stated as uncontrolled        Past Surgical History     Past Surgical History:   Procedure Laterality Date    CARDIAC DEFIBRILLATOR PLACEMENT      CORONARY ARTERY BYPASS GRAFT  2004    CT Liana Caterina INCIS Left 9/10/2018    LEFT CAROTID ENDARTERECTOMY performed by Bartolo Dowd MD at [address]       Family History     Family History   Problem Relation Age of Onset    Coronary Art Dis Mother     Hypertension Mother     Coronary Art Dis Father     Hypertension Father        Social History     Social History     Tobacco Use    Smoking status: Never    Smokeless tobacco: Never   Vaping Use    Vaping Use: Never used   Substance Use Topics    Alcohol use: No    Drug use: No        Allergies     Allergies   Allergen Reactions    Ace Inhibitors Other (See Comments)     angioedema  Other reaction(s): angioedema/H&P    Ticlid [Ticlopidine Hydrochloride]      rash    Ticlopidine      Other reaction(s): rash/H&P       Medications     Current Outpatient Medications   Medication Sig Dispense Refill    ULTICARE MINI PEN NEEDLES 31G X 6 MM MISC USE TWICE DAILY FOR INSULIN INJECTION 200 each 1    insulin lispro, 1 Unit Dial, 100 UNIT/ML SOPN INJECT UNDER THE SKIN 5 UNITS DAILY BEFORE EVENING MEAL 3 mL 2    apixaban (ELIQUIS) 5 MG TABS tablet Take by mouth      LANTUS SOLOSTAR 100 UNIT/ML injection pen INJECT SUBCUTANEOUSLY 10  UNITS TWICE DAILY BEFORE  BREAKFAST AND EVENING MEAL 30 mL 3    amLODIPine (NORVASC) 5 MG tablet TAKE ONE TABLET BY MOUTH DAILY 90 tablet 1    atorvastatin (LIPITOR) 20 MG tablet TAKE 1 TABLET BY MOUTH  DAILY 90 tablet 1    difluprednate (DUREZOL) 0.05 % EMUL instill 1 drop by ophthalmic route 2 times every day into the right eye and 8 times a day in the left eye      glipiZIDE (GLUCOTROL) 5 MG tablet TAKE 1 TABLET BY MOUTH  DAILY 90 tablet 3    blood glucose test strips (ACCU-CHEK BRITNEY PLUS) strip TEST TWICE DAILY FOR  DIABETES 200 strip 3    JANUVIA 100 MG tablet TAKE 1 TABLET BY MOUTH  DAILY 90 tablet 3    Accu-Chek FastClix Lancets MISC Test two times daily for  diabetes 204 each 3    Insulin Syringe-Needle U-100 ([employer] INSULIN SYRINGE) 31G X 5/16\" 0.5 ML MISC 1 each by Does not apply route daily Use daily for insulin adminstration 100 each 3    Blood Glucose Monitoring Suppl (ACCU-CHEK BRITNEY PLUS) W/DEVICE KIT 1 each by Does not apply route 2 times daily. TEST TWICE DAILY FOR DIABETES 1 kit 0    naproxen (NAPROSYN) 500 MG tablet Take 1 tablet by mouth in the morning and 1 tablet in the evening. Take with meals. Do all this for 5 days.  10 tablet 0    ondansetron (ZOFRAN) 4 MG tablet Take 1 tablet by mouth 3 times daily as needed for Nausea or Vomiting (Patient not taking: Reported on 8/16/2022) 30 tablet 0    fluticasone (FLONASE) 50 MCG/ACT nasal spray 2 sprays by Each Nostril route daily (Patient not taking: Reported on 8/16/2022) 48 g 1    metoprolol succinate (TOPROL XL) 50 MG extended release tablet TAKE 1 TABLET BY MOUTH TWO  TIMES DAILY (Patient not taking: Reported on 8/16/2022) 180 tablet 3     No current facility-administered medications for this visit. Review of Systems     REVIEW OF SYSTEM: See HPI above    PhysicalExam     Vitals:    08/16/22 1022   BP: (!) 162/69   Site: Right Upper Arm   Position: Sitting   Cuff Size: Medium Adult   Pulse: 76   Temp: 97.5 °F (36.4 °C)   TempSrc: Temporal       PHYSICAL EXAM  BP (!) 162/69 (Site: Right Upper Arm, Position: Sitting, Cuff Size: Medium Adult)   Pulse 76   Temp 97.5 °F (36.4 °C) (Temporal)     GENERAL: No acute distress, alert and oriented  EYES: EOMI, Anti-icteric  NOSE: On anterior rhinoscopy there is no epistaxis, nasal mucosa moist and normal appearing, no purulent drainage. EARS: Normal external appearance; on portable otomicroscopy:     -Ad: External auditory canal without stenosis, tympanic membrane diffusely thickened with heavy myringosclerosis and localized small area of monomeric tympanic membrane posteriorly inferiorly. No mobility on pneumatic otoscopy. Unable to visualize middle ear space.     -As: External auditory canal without stenosis, tympanic membrane with partial myringosclerosis otherwise intact, no middle ear effusions or retractions. FACE: HB 1/6 bilaterally, symmetric appearing, sensation equal bilaterally  ORAL CAVITY: No masses or lesions visualized or palpated, uvula is midline, moist mucous membranes, absent tonsils  NECK: Normal range of motion, no thyromegaly, trachea is midline, no palpable lymphadenopathy or neck masses, no crepitus  NEURO: Cranial Nerves 2, 3, 4, 5, 6, 7, 11, 12 grossly intact bilaterally    I have performed a head and neck physical exam personally or was physically present during the key or critical portions of the service. Procedure     Comprehensive audiological evaluation performed in the office today by Say CHEATHAM was independently reviewed by myself which demonstrates: Au:  Moderate to severe mixed hearing loss, air conduction thresholds worse on the right in lower and mid frequencies. WRS 84% right, WRS 84% left. Type B tympanogram right. Unable to obtain seal for left tympanogram.    Assessment and Plan     1. Mixed conductive and sensorineural hearing loss of both ears  2. Tympanosclerosis of right ear  3. Dysfunction of right eustachian tube  4. History of placement of ear tubes    Plan:  -Mixed hearing loss worse on the right likely secondary to immobility from thickened right tympanic membrane and possible middle ear dysfunction. Unable to visualize middle ear space through thick tympanosclerosis. Offered CT temporal bone but patient would like to hold off on this for now. No improvement in the past after placement of right ear tube, could theoretically improve some of his conductive hearing loss on the right with tympanoplasty but he is not interested in surgery at this time and rather he will pursue a new set of hearing aids which was his initial pursuit prior to getting sent here by his audiologist.  -Repeat audiogram in 1 year    Follow-Up     Return in about 1 year (around 8/16/2023) for audiogram prior to appointment. Tahir Moran   Department of Otolaryngology/Head and Neck Surgery  8/16/22    Medical Decision Making: The following items were considered in medical decision making:  Independent review of images  Review / order clinical lab tests  Review / order radiology tests  Decision to obtain old records      This note was generated completely or in part utilizing Dragon dictation speech recognition software. Occasionally, words are mistranscribed and despite editing, the text may contain inaccuracies due to incorrect word recognition. If further clarification is needed please contact the office at [phone].

## 2022-08-18 ENCOUNTER — TELEPHONE (OUTPATIENT)
Dept: PRIMARY CARE CLINIC | Age: 77
End: 2022-08-18

## 2022-08-18 DIAGNOSIS — R22.1 NECK MASS: Primary | ICD-10-CM

## 2022-08-18 NOTE — RESULT ENCOUNTER NOTE
PT informed. Went over extensively with patient and wife. Will call to schedule ultrasound and have bloodwork done when he goes for the scan. No further action is needed for this encounter.

## 2022-08-24 ENCOUNTER — HOSPITAL ENCOUNTER (OUTPATIENT)
Dept: ULTRASOUND IMAGING | Age: 77
Discharge: HOME | End: 2022-08-24
Payer: COMMERCIAL

## 2022-08-24 ENCOUNTER — HOSPITAL ENCOUNTER (OUTPATIENT)
Dept: VASCULAR LAB | Age: 77
Discharge: HOME | End: 2022-08-24
Payer: COMMERCIAL

## 2022-08-24 DIAGNOSIS — R22.1 NECK MASS: ICD-10-CM

## 2022-08-24 DIAGNOSIS — I65.23 BILATERAL CAROTID ARTERY STENOSIS: Primary | ICD-10-CM

## 2022-08-24 PROCEDURE — 76536 US EXAM OF HEAD AND NECK: CPT

## 2022-08-24 PROCEDURE — 93880 EXTRACRANIAL BILAT STUDY: CPT

## 2022-08-29 ENCOUNTER — TELEPHONE (OUTPATIENT)
Dept: VASCULAR SURGERY | Age: 77
End: 2022-08-29

## 2022-08-29 DIAGNOSIS — I65.23 BILATERAL CAROTID ARTERY STENOSIS: Primary | ICD-10-CM

## 2022-09-06 RX ORDER — ATORVASTATIN CALCIUM 20 MG/1
TABLET, FILM COATED ORAL
Qty: 90 TABLET | Refills: 1 | Status: SHIPPED | OUTPATIENT
Start: 2022-09-06

## 2022-09-06 NOTE — TELEPHONE ENCOUNTER
Medication:   Requested Prescriptions     Pending Prescriptions Disp Refills    atorvastatin (LIPITOR) 20 MG tablet [Pharmacy Med Name: Atorvastatin Calcium 20 MG Oral Tablet] 90 tablet 3     Sig: TAKE 1 TABLET BY MOUTH  DAILY        Last Filled:      Patient Phone Number: [phone] (home)     Last appt: 8/4/2022   Next appt: 10/12/2022    Last OARRS: No flowsheet data found.

## 2022-09-08 DIAGNOSIS — E11.9 CONTROLLED TYPE 2 DIABETES MELLITUS WITHOUT COMPLICATION, WITH LONG-TERM CURRENT USE OF INSULIN (HCC): Primary | ICD-10-CM

## 2022-09-08 DIAGNOSIS — Z79.4 CONTROLLED TYPE 2 DIABETES MELLITUS WITHOUT COMPLICATION, WITH LONG-TERM CURRENT USE OF INSULIN (HCC): Primary | ICD-10-CM

## 2022-09-08 RX ORDER — BLOOD SUGAR DIAGNOSTIC
STRIP MISCELLANEOUS
Qty: 200 STRIP | Refills: 3 | Status: SHIPPED | OUTPATIENT
Start: 2022-09-08

## 2022-09-08 NOTE — TELEPHONE ENCOUNTER
Medication:   Requested Prescriptions     Pending Prescriptions Disp Refills    blood glucose test strips (EXACTECH TEST) strip 100 each 3     Si each by In Vitro route daily As needed.        Last Filled:  2021    Patient Phone Number: [phone] (home)     Last appt: 2022   Next appt: 10/12/2022    Last Labs DM:   Lab Results   Component Value Date/Time    LABA1C 7.1 2022 11:30 AM

## 2022-09-15 DIAGNOSIS — R22.1 NECK MASS: ICD-10-CM

## 2022-09-16 LAB
CALCIUM SERPL-MCNC: 9.6 MG/DL (ref 8.3–10.6)
PARATHYROID HORMONE INTACT: 57.5 PG/ML (ref 14–72)

## 2022-10-12 ENCOUNTER — OFFICE VISIT (OUTPATIENT)
Dept: PRIMARY CARE CLINIC | Age: 77
End: 2022-10-12
Payer: COMMERCIAL

## 2022-10-12 VITALS
TEMPERATURE: 97.8 F | WEIGHT: 160 LBS | DIASTOLIC BLOOD PRESSURE: 70 MMHG | RESPIRATION RATE: 16 BRPM | HEART RATE: 60 BPM | OXYGEN SATURATION: 95 % | BODY MASS INDEX: 26.66 KG/M2 | SYSTOLIC BLOOD PRESSURE: 124 MMHG | HEIGHT: 65 IN

## 2022-10-12 DIAGNOSIS — Z79.4 CONTROLLED TYPE 2 DIABETES MELLITUS WITHOUT COMPLICATION, WITH LONG-TERM CURRENT USE OF INSULIN (HCC): ICD-10-CM

## 2022-10-12 DIAGNOSIS — Z23 NEED FOR INFLUENZA VACCINATION: ICD-10-CM

## 2022-10-12 DIAGNOSIS — I10 PRIMARY HYPERTENSION: ICD-10-CM

## 2022-10-12 DIAGNOSIS — E11.9 CONTROLLED TYPE 2 DIABETES MELLITUS WITHOUT COMPLICATION, WITH LONG-TERM CURRENT USE OF INSULIN (HCC): ICD-10-CM

## 2022-10-12 DIAGNOSIS — M50.30 DDD (DEGENERATIVE DISC DISEASE), CERVICAL: ICD-10-CM

## 2022-10-12 DIAGNOSIS — I25.10 ATHEROSCLEROSIS OF NATIVE CORONARY ARTERY OF NATIVE HEART WITHOUT ANGINA PECTORIS: Primary | ICD-10-CM

## 2022-10-12 DIAGNOSIS — I65.23 BILATERAL CAROTID ARTERY STENOSIS: ICD-10-CM

## 2022-10-12 PROCEDURE — 1036F TOBACCO NON-USER: CPT | Performed by: FAMILY MEDICINE

## 2022-10-12 PROCEDURE — 99214 OFFICE O/P EST MOD 30 MIN: CPT | Performed by: FAMILY MEDICINE

## 2022-10-12 PROCEDURE — 90694 VACC AIIV4 NO PRSRV 0.5ML IM: CPT | Performed by: FAMILY MEDICINE

## 2022-10-12 PROCEDURE — 3051F HG A1C>EQUAL 7.0%<8.0%: CPT | Performed by: FAMILY MEDICINE

## 2022-10-12 PROCEDURE — 1123F ACP DISCUSS/DSCN MKR DOCD: CPT | Performed by: FAMILY MEDICINE

## 2022-10-12 PROCEDURE — G8417 CALC BMI ABV UP PARAM F/U: HCPCS | Performed by: FAMILY MEDICINE

## 2022-10-12 PROCEDURE — 90471 IMMUNIZATION ADMIN: CPT | Performed by: FAMILY MEDICINE

## 2022-10-12 PROCEDURE — G8427 DOCREV CUR MEDS BY ELIG CLIN: HCPCS | Performed by: FAMILY MEDICINE

## 2022-10-12 PROCEDURE — G8484 FLU IMMUNIZE NO ADMIN: HCPCS | Performed by: FAMILY MEDICINE

## 2022-10-12 ASSESSMENT — ENCOUNTER SYMPTOMS
VOMITING: 0
ABDOMINAL PAIN: 0
SHORTNESS OF BREATH: 0
NAUSEA: 0
BLOOD IN STOOL: 0
COUGH: 0
SORE THROAT: 0

## 2022-10-12 NOTE — PROGRESS NOTES
Chief Complaint   Patient presents with    Hypertension     Pt here for follow up - pt requesting Hep A vaccine         Rayshawn Alas is a 76 y.o. male who presents for routine visit    Patient did recent CT scan cervical spine which showed decreased bone density with multilevel degenerative disc disease and patient needs to start taking OTC Oscal D 1 pill twice a day along with Rx naprosyn as needed for pain. Patient also had signs of extensive atherosclerosis (plaque buildup) in his R carotid artery and needs to continue on his lipitor medication. Finally patient had an 8 mm lesion noted behind his R thyroid lobe and will check an US thyroid and bloodwork for further evaluation --- Pt reports resolution of his neck pain with Rx medrol dose devendra and has been taking supplemental calcium. --- Patient did recent thyroid US which was normal; and in particular showed no nodules. --- Patient did recent calcium and parathyroid hormone level bloodwork which was within normal limits     Pt has a hx of diabetes and is on glucotrol, januvia and lantus insulin 10 units BID and reports FBS readings around 100     Patient has a past medical history significant for CAD s/p CABG 2004, HTN and A fib s/p ablation 08/2021 and is followed by Cardiology and is on norvasc, toprol, lipitor and eliquis medications and had recent cardiac stress test which was normal     Pt has a hx of carotid stenosis s/p L CEA     Pt denies any hx of stroke, CHF or kidney disease     Pt reports a normal colonoscopy about 5 year sago     Pt is a nonsmoker and denies any alcohol use     Pt did complete his COVID vaccine series      Review of Systems   Constitutional:  Negative for fatigue and fever. HENT:  Negative for nosebleeds and sore throat. Eyes:         Negative blurred vision or diplopia   Respiratory:  Negative for cough and shortness of breath. Cardiovascular:  Positive for leg swelling (at times).  Negative for chest pain and palpitations. Gastrointestinal:  Negative for abdominal pain, blood in stool, nausea and vomiting. Negative melena or indigestion   Endocrine: Negative for polydipsia and polyuria. Genitourinary:  Negative for dysuria and hematuria. Musculoskeletal:  Negative for arthralgias and neck pain. Skin:  Negative for rash. Neurological:  Negative for dizziness, seizures, syncope, speech difficulty, weakness and headaches. Positive paresthesias RUE at times    Psychiatric/Behavioral:  Negative for dysphoric mood. The patient is not nervous/anxious. Vitals:    10/12/22 1046   BP: 124/70   Pulse: 60   Resp: 16   Temp: 97.8 °F (36.6 °C)   SpO2: 95%         Physical Exam  Vitals reviewed. Constitutional:       General: He is not in acute distress. HENT:      Mouth/Throat:      Mouth: Mucous membranes are moist.      Pharynx: Oropharynx is clear. Eyes:      General: No scleral icterus. Comments: Pink conjunctivae    Neck:      Thyroid: No thyromegaly. Comments: Nontender cervical spine and paracervical musculature B/L  Cardiovascular:      Heart sounds: Normal heart sounds. No murmur heard. No friction rub. No gallop. Pulmonary:      Effort: Pulmonary effort is normal.      Breath sounds: Normal breath sounds. Abdominal:      Palpations: Abdomen is soft. Tenderness: no abdominal tenderness   Musculoskeletal:      Comments: Positive  4+ leg edema noted B/L   Lymphadenopathy:      Cervical: No cervical adenopathy. Skin:     Findings: No rash. Neurological:      Mental Status: He is alert. Comments: Cranial nerves grossly intact and muscle strength 5/5 throughout   Psychiatric:         Mood and Affect: Mood normal.       ASSESSMENT AND PLAN    1. Need for influenza vaccination  - Influenza, FLUAD, (age 72 y+), IM, PF, 0.5 mL    2.  Primary hypertension  Patient clinically stable on present medications and was told to follow a low sodium diet and to keep his legs elevated as much as possible given his chronic leg edema on PE    3. Atherosclerosis of native coronary artery of native heart without angina pectoris  Patient clinically stable on present medications and denies any CP or SOB    4. DDD (degenerative disc disease), cervical  Pt clinically improved s/p treatment with medrol dose devendra    5. Controlled type 2 diabetes mellitus without complication, with long-term current use of insulin ([facility] [address]  Patient clinically stable on present medications    6. Bilateral carotid artery stenosis  Patient clinically stable on present medications and is with a nonfocal neuro exam        Tasha Cooper MD      Return in about 4 months (around 2/12/2023). Patient Instructions     Go to the ER ASAP if you develop any chest pain, shortness of breath, facial droop, slurred speech, weakness/numbness in your arms or legs or double vision!

## 2022-10-14 NOTE — TELEPHONE ENCOUNTER
Cc: HOCM s/p septal myectomy, sMR s/p bio MVR    HPI:     Patient is a 73 yo woman with h/o mild asthma, HTN, HLP, smoker, severe MR and HOCM s/p MDT Mosaic bioprosthetic MVR (25 mm) and basal septal myectomy by Dr Terrell Espinoza on 6/29/2020. Echo 02/2022: mod LVH, EF > 60%, indeterminate diastolic, normal bioprosthetic MVR with elevated gradients (peak/mean PG 18/9 mmhg), moderate MS, mild RVD with normal function. [place] 03/2022: normal    LHC 02/2020: normal coronaries, LVOT gradient of about 100 mmhg (222 mmhg down to 120 mmhg from apex to LVOT). [facility] CMR 02/2020: severe MR, HOCM, + SITA, LGE. Patient is here to establish care with me today. She reports no complaints. She is on HCTZ by her OB for \"abdominal bloating\". Histories     Past Medical History:   has a past medical history of Adrenal adenoma, Anxiety, Arthritis, Asthma, Chronic nausea, Environmental allergies, GERD (gastroesophageal reflux disease), HOCM (hypertrophic obstructive cardiomyopathy) ([facility] [address] HTN (hypertension), Irritable bowel syndrome, Lung disease, Microscopic colitis, Migraine, Mitral regurgitation, Nephrolithiasis, Nosebleed, Osteoporosis, Rash, Restless leg syndrome, TMJ dysfunction, Vitreous detachment, and Wrist fracture, bilateral.    Surgical History:   has a past surgical history that includes Wrist fracture surgery (Left, 2009); knee surgery (Right, 1975); Ovary removal (Left); Colonoscopy (7/13); Colonoscopy (03/29/2018); Upper gastrointestinal endoscopy (03/2018); cardiovascular stress test (2016); Cholecystectomy, laparoscopic (N/A, 3/27/2019); Cardiac catheterization (02/28/2020); transesophageal echocardiogram (03/11/2020); and Mitral valve repair (N/A, 6/29/2020). Social History:   reports that she quit smoking about 12 years ago. Her smoking use included cigarettes. She started smoking about 50 years ago. She has a 38.00 pack-year smoking history.  She has never used smokeless tobacco. She reports that she does Received call from Alirio Olsen at [facility] with Red Flag Complaint. Subjective: Caller states \"I have leg swelling\"     Current Symptoms: Bilateral leg swelling on his feet and lower legs, below his knee with raised red rash. Rash is itchy. Rash comes and goes to different parts of his body. Started on his arms, then on his trunk and now on his legs. 9 spots currently present. No weeping fluid. No chest pain or SOB. Twitching in R shoulder. L is more swollen than his R.  Legs are not red, warm to touch or painful. Would like a note to be exempt from jury duty  9/4/22    Onset: 1 month ago; worsening, waxing and waning    Associated Symptoms: NA    Pain Severity: 0/10; N/A; none    Temperature: denies fever     What has been tried: elevation and sleeping-helps with the swelling. LMP: NA Pregnant: NA    Recommended disposition: Go to ED/UCC Now (Or to Office with PCP Approval)    Care advice provided, patient verbalizes understanding; denies any other questions or concerns; instructed to call back for any new or worsening symptoms. Writer provided warm transfer to Josie Alvarado at [facility] for second level triage. Attention Provider: Thank you for allowing me to participate in the care of your patient. The patient was connected to triage in response to information provided to the ECC/PSC. Please do not respond through this encounter as the response is not directed to a shared pool.     Reason for Disposition   Thigh, calf, or ankle swelling in both legs, but one side is definitely more swollen (Exception: longstanding difference between legs)    Protocols used: Leg Swelling and Edema-ADULT-OH not currently use alcohol. She reports that she does not use drugs. Family History:  No evidence for sudden cardiac death or premature CAD      Medications:     Home medications were reviewed and are listed below    Prior to Admission medications    Medication Sig Start Date End Date Taking? Authorizing Provider   metoprolol succinate (TOPROL XL) 100 MG extended release tablet Take 1 tablet by mouth daily 10/14/22  Yes Bossman Ruiz MD   prochlorperazine (COMPAZINE) 10 MG tablet TAKE ONE TABLET BY MOUTH EVERY 6 HOURS AS NEEDED 10/13/22  Yes Terell Corbett MD   amitriptyline (ELAVIL) 50 MG tablet Take 1 tablet by mouth nightly 8/29/22  Yes Terell Corbett MD   diazePAM (VALIUM) 5 MG tablet Take 5 mg by mouth every 6 hours as needed for Anxiety. Yes Historical Provider, MD   memantine (NAMENDA) 5 MG tablet Take 5 mg by mouth in the morning and 5 mg before bedtime. Yes Historical Provider, MD   diazePAM (VALIUM) 5 MG tablet Take daily as needed for IBS symptoms. May take second dose if first dose does not provide relief. Do not take with alprazolam 8/9/22 11/7/22 Yes Terell Corbett MD   rOPINIRole (REQUIP) 0.5 MG tablet Take 1 tablet by mouth nightly 8/9/22  Yes Terell Corbett MD   omeprazole (PRILOSEC) 40 MG delayed release capsule Take 1 capsule by mouth in the morning. 8/9/22  Yes Terell Corbett MD   famotidine (PEPCID) 40 MG tablet Take 1 tablet by mouth in the morning.  8/9/22  Yes Terell Corbett MD   dicyclomine (BENTYL) 20 MG tablet TAKE ONE TABLET BY MOUTH FOUR TIMES A DAY AS NEEDED 7/12/22  Yes Terell Corbett MD   levothyroxine (SYNTHROID) 125 MCG tablet TAKE ONE TABLET BY MOUTH DAILY 3/14/22  Yes Terell Corbett MD   hydroCHLOROthiazide (HYDRODIURIL) 50 MG tablet Take 1 tablet by mouth daily 3/2/22  Yes TABITHA Duran CNP   ZOLMitriptan (ZOMIG) 5 MG tablet Take 5 mg by mouth as needed for Migraine   Yes Historical Provider, MD   ALPRAZolam Prudence Sole) 0.5 MG tablet Take 1 tablet by mouth daily as needed for Anxiety. 10/28/21 10/28/22 Yes Violeta Drew MD   atorvastatin (LIPITOR) 80 MG tablet TAKE ONE TABLET BY MOUTH EVERY EVENING 10/20/21  Yes Jonda Cranker, APRN - CNP   loratadine (CLARITIN) 10 MG tablet Take 10 mg by mouth daily   Yes Historical Provider, MD   Coenzyme Q10 (CO Q-10) 200 MG CAPS Take 200 mg by mouth   Yes Historical Provider, MD   Ascorbic Acid (VITAMIN C) 500 MG CAPS Take 500 mg by mouth   Yes Historical Provider, MD   aspirin 81 MG EC tablet Take 81 mg by mouth daily   Yes Historical Provider, MD   docusate sodium (COLACE) 100 MG capsule Take 100 mg by mouth 2 times daily as needed for Constipation   Yes Historical Provider, MD   Multiple Vitamins-Minerals (CENTRUM SILVER PO) Take 1 tablet by mouth daily   Yes Historical Provider, MD   Omega-3 Fatty Acids (FISH OIL) 1200 MG CAPS Take 1 capsule by mouth 2 times daily   Yes Historical Provider, MD   Flaxseed, Linseed, (FLAXSEED OIL PO) Take 1,300 mg by mouth daily   Yes Historical Provider, MD   FIBER PO Take 1 capsule by mouth daily    Yes Historical Provider, MD   denosumab (PROLIA) 60 MG/ML SOSY SC injection Inject 1 mL into the skin once for 1 dose 3/3/22 5/3/22  Marty Rizzo MD          Allergy:     Tolectin [tolmetin]       Review of Systems:     All 12 point review of symptoms completed. Pertinent positives identified in the HPI, all other review of symptoms negative as below. CONSTITUTIONAL: No fatigue  SKIN: No rash or pruritis. EYES: No visual changes or diplopia. No scleral icterus. ENT: No Headaches, hearing loss or vertigo. No mouth sores or sore throat. CARDIOVASCULAR: No chest pain/chest pressure/chest discomfort. No palpitations. No edema. RESPIRATORY: No dyspnea. No cough or wheezing, no sputum production. GASTROINTESTINAL: No N/V/D. No abdominal pain, appetite loss, blood in stools. GENITOURINARY: No dysuria, trouble voiding, or hematuria.   MUSCULOSKELETAL:  No gait disturbance, weakness or joint complaints. NEUROLOGICAL: No headache, diplopia, change in muscle strength, numbness or tingling. No change in gait, balance, coordination, mood, affect, memory, mentation, behavior. PSHYCH: No anxiety, loss of interest, change in sexual behavior, feelings of self-harm, or confusion. ENDOCRINE: No excessive thirst, fluid intake, or urination. No tremor. HEMATOLOGIC: No abnormal bruising or bleeding. ALLERGY: No nasal congestion or hives.       Physical Examination:     Vitals:    10/14/22 1428   BP: 100/60   Pulse: 72   Weight: 173 lb 12.8 oz (78.8 kg)       Wt Readings from Last 3 Encounters:   10/14/22 173 lb 12.8 oz (78.8 kg)   10/13/22 173 lb (78.5 kg)   10/04/22 169 lb (76.7 kg)         General Appearance:  Alert, cooperative, no distress, appears stated age Appropriate weight   Head:  Normocephalic, without obvious abnormality, atraumatic   Eyes:  PERRL, conjunctiva/corneas clear EOM intact  Ears normal   Throat no lesions       Nose: Nares normal, no drainage or sinus tenderness   Throat: Lips, mucosa, and tongue normal   Neck: Supple, symmetrical, trachea midline, no adenopathy, thyroid: not enlarged, symmetric, no tenderness/mass/nodules, no carotid bruit       Lungs:   Clear to auscultation bilaterally, respirations unlabored   Chest Wall:  No tenderness or deformity   Heart:  Regular rhythm, rate is controlled, S1, S2 normal, there is II/VI RUSB systolic murmur, there is no rub or gallop, cannot assess jvd, no bilateral lower extremity edema   Abdomen:   Soft, non-tender, bowel sounds active all four quadrants,  no masses, no organomegaly       Extremities: Extremities normal, atraumatic, no cyanosis   Pulses: 2+ and symmetric   Skin: Skin color, texture, turgor normal, no rashes or lesions   Pysch: Normal mood and affect   Neurologic: Normal gross motor and sensory exam.  Cranial nerves intact        Labs:     Lab Results   Component Value Date    WBC 4.2 04/26/2022    HGB 13.9 04/26/2022    HCT 40.1 04/26/2022    MCV 86.3 04/26/2022     04/26/2022     Lab Results   Component Value Date     08/02/2022    K 4.2 08/02/2022    CL 99 08/02/2022    CO2 28 08/02/2022    BUN 12 08/02/2022    CREATININE 0.7 08/02/2022    GLUCOSE 96 08/02/2022    CALCIUM 10.1 08/02/2022    PROT 6.5 04/26/2022    LABALBU 3.8 04/26/2022    BILITOT 0.3 04/26/2022    ALKPHOS 87 04/26/2022    AST 29 04/26/2022    ALT 29 04/26/2022    LABGLOM >60 08/02/2022    GFRAA >60 08/02/2022    AGRATIO 1.4 04/26/2022    GLOB 2.7 10/15/2021         Lab Results   Component Value Date    CHOL 138 04/26/2022    CHOL 137 10/15/2021    CHOL 133 02/22/2021     Lab Results   Component Value Date    TRIG 66 04/26/2022    TRIG 33 10/15/2021    TRIG 63 02/22/2021     Lab Results   Component Value Date    HDL 58 04/26/2022    HDL 75 (H) 10/15/2021    HDL 72 (H) 02/22/2021     Lab Results   Component Value Date    LDLCALC 67 04/26/2022    LDLCALC 55 10/15/2021    LDLCALC 48 02/22/2021     Lab Results   Component Value Date    LABVLDL 13 04/26/2022    LABVLDL 7 10/15/2021    LABVLDL 13 02/22/2021     No results found for: [facility]    Lab Results   Component Value Date    INR 1.19 (H) 06/30/2020    INR 1.07 06/29/2020    INR 1.16 (H) 06/29/2020    PROTIME 13.8 (H) 06/30/2020    PROTIME 12.4 06/29/2020    PROTIME 13.5 (H) 06/29/2020       The 10-year ASCVD risk score (Delonte RUFF, et al., 2019) is: 5.4%    Values used to calculate the score:      Age: 74 years      Sex: Female      Is Non- : No      Diabetic: No      Tobacco smoker: No      Systolic Blood Pressure: 831 mmHg      Is BP treated: Yes      HDL Cholesterol: 58 mg/dL      Total Cholesterol: 138 mg/dL      Assessment / Plan:      Diagnosis Orders   1. H/O mitral valve replacement        2. Hypertrophic obstructive cardiomyopathy ([address]        3.  S/P ventricular septal myectomy             1. HOCM s/p myectomy:   Patient is now asymptomatic and hemo stable.     -Cw Toprol 100 daily    2. Severe MR s/p bioprosthetic MVR:   Normal functioning mitral prosthesis with mildly elevated gradients.     -Cw BB and maintain euvolemia. 3. Systemic HTN:   BP is low today.     -I asked patient to start monitoring his BP and HR daily and record values, then let us know within 2 weeks for further med adjustments.   -Will stop lisinopril 2.5 daily  -Cw BB; patient wants to continue HCTZ for abdominal bloating. We will schedule a follow up visit in 1 year      I have spent 62 minutes of face to face time with the patient with more than 50% spent counseling and coordinating care. I have personally reviewed the reports and images of labs, radiological studies, cardiac studies including ECG's and telemetry, current and old medical records. The note was completed using EMR and Dragon dictation system. Every effort was made to ensure accuracy; however, inadvertent computerized transcription errors may be present. All questions and concerns were addressed to the patient/family. Alternatives to my treatment were discussed. I would like to thank you for providing me the opportunity to participate in the care of your patient. If you have any questions, please do not hesitate to contact me.      Manuel Ventura MD, [facility] - [address] Office Phone: [phone]  Fax: [phone]

## 2022-11-28 RX ORDER — LANCETS
EACH MISCELLANEOUS
Qty: 204 EACH | Refills: 3 | OUTPATIENT
Start: 2022-11-28

## 2022-11-28 RX ORDER — GLIPIZIDE 5 MG/1
TABLET ORAL
Qty: 90 TABLET | Refills: 3 | OUTPATIENT
Start: 2022-11-28

## 2022-12-05 RX ORDER — METOPROLOL SUCCINATE 50 MG/1
TABLET, EXTENDED RELEASE ORAL
Qty: 180 TABLET | Refills: 3 | OUTPATIENT
Start: 2022-12-05

## 2022-12-09 DIAGNOSIS — I10 PRIMARY HYPERTENSION: ICD-10-CM

## 2022-12-09 NOTE — TELEPHONE ENCOUNTER
Medication:   Requested Prescriptions     Pending Prescriptions Disp Refills    amLODIPine (NORVASC) 5 MG tablet [Pharmacy Med Name: amLODIPine BESYLATE 5 MG TAB] 90 tablet 1     Sig: TAKE ONE TABLET BY MOUTH DAILY       Last Filled: 03/24/2022 #90 with 1 refill      Patient Phone Number: [phone] (home)     Last appt: 10/12/2022   Next appt: 2/15/2023    Last Lipid:   Lab Results   Component Value Date/Time    CHOL 130 09/10/2021 02:26 PM    TRIG 105 09/10/2021 02:26 PM    HDL 48 09/10/2021 02:26 PM    HDL 44 03/14/2012 11:27 AM    LDLCALC 61 09/10/2021 02:26 PM

## 2022-12-12 ENCOUNTER — TELEPHONE (OUTPATIENT)
Dept: PRIMARY CARE CLINIC | Age: 77
End: 2022-12-12

## 2022-12-12 DIAGNOSIS — E11.9 CONTROLLED TYPE 2 DIABETES MELLITUS WITHOUT COMPLICATION, WITH LONG-TERM CURRENT USE OF INSULIN (HCC): Primary | ICD-10-CM

## 2022-12-12 DIAGNOSIS — Z79.4 CONTROLLED TYPE 2 DIABETES MELLITUS WITHOUT COMPLICATION, WITH LONG-TERM CURRENT USE OF INSULIN (HCC): Primary | ICD-10-CM

## 2022-12-12 RX ORDER — LANCETS
1 EACH MISCELLANEOUS 4 TIMES DAILY
Qty: 100 EACH | Refills: 3 | Status: SHIPPED | OUTPATIENT
Start: 2022-12-12

## 2022-12-12 RX ORDER — AMLODIPINE BESYLATE 5 MG/1
TABLET ORAL
Qty: 90 TABLET | Refills: 1 | Status: SHIPPED | OUTPATIENT
Start: 2022-12-12

## 2022-12-12 NOTE — TELEPHONE ENCOUNTER
The patient's son called and said that his father's \"Accu-Check [employer]\" glucose meter had broke. The son had contacted the  of the meter and they sent a new meter called the \"Accu-Check Plus\". The son states the new meter/supplies that was given by the  is going to be discontinued. The patient's son states that they need a new order written for the \"Guide Me Meter\"/Supplies that goes with it. That meter is covered by insurance. Son states if we send everything to [facility] on [address] it will be covered.      Contact is son Deidre Sees: [phone]

## 2023-01-11 RX ORDER — GLIPIZIDE 5 MG/1
TABLET ORAL
Qty: 90 TABLET | Refills: 1 | Status: SHIPPED | OUTPATIENT
Start: 2023-01-11

## 2023-01-11 NOTE — TELEPHONE ENCOUNTER
Medication:   Requested Prescriptions     Pending Prescriptions Disp Refills    glipiZIDE (GLUCOTROL) 5 MG tablet 90 tablet 3     Sig: TAKE 1 TABLET BY MOUTH  DAILY       Last Filled:  11.22.2021  #90 w/ 3 refills     Patient Phone Number: [phone] (home)     Last appt: 10/12/2022   Next appt: 2/15/2023    Last Labs DM:   Lab Results   Component Value Date/Time    LABA1C 7.1 03/24/2022 11:30 AM

## 2023-01-25 RX ORDER — INSULIN LISPRO 100 [IU]/ML
INJECTION, SOLUTION INTRAVENOUS; SUBCUTANEOUS
Qty: 3 ML | Refills: 2 | Status: SHIPPED | OUTPATIENT
Start: 2023-01-25

## 2023-02-23 ENCOUNTER — OFFICE VISIT (OUTPATIENT)
Dept: PRIMARY CARE CLINIC | Age: 78
End: 2023-02-23
Payer: COMMERCIAL

## 2023-02-23 VITALS
HEART RATE: 71 BPM | WEIGHT: 159 LBS | DIASTOLIC BLOOD PRESSURE: 64 MMHG | OXYGEN SATURATION: 95 % | RESPIRATION RATE: 18 BRPM | SYSTOLIC BLOOD PRESSURE: 134 MMHG | BODY MASS INDEX: 26.49 KG/M2 | HEIGHT: 65 IN | TEMPERATURE: 98 F

## 2023-02-23 DIAGNOSIS — Z79.4 CONTROLLED TYPE 2 DIABETES MELLITUS WITHOUT COMPLICATION, WITH LONG-TERM CURRENT USE OF INSULIN (HCC): Primary | ICD-10-CM

## 2023-02-23 DIAGNOSIS — J02.9 ACUTE PHARYNGITIS, UNSPECIFIED ETIOLOGY: ICD-10-CM

## 2023-02-23 DIAGNOSIS — E11.9 CONTROLLED TYPE 2 DIABETES MELLITUS WITHOUT COMPLICATION, WITH LONG-TERM CURRENT USE OF INSULIN (HCC): Primary | ICD-10-CM

## 2023-02-23 DIAGNOSIS — I65.23 BILATERAL CAROTID ARTERY STENOSIS: ICD-10-CM

## 2023-02-23 DIAGNOSIS — I10 PRIMARY HYPERTENSION: ICD-10-CM

## 2023-02-23 DIAGNOSIS — I25.10 ATHEROSCLEROSIS OF NATIVE CORONARY ARTERY OF NATIVE HEART WITHOUT ANGINA PECTORIS: ICD-10-CM

## 2023-02-23 PROBLEM — I49.5 SICK SINUS SYNDROME (HCC): Status: RESOLVED | Noted: 2020-12-11 | Resolved: 2023-02-23

## 2023-02-23 PROCEDURE — 3074F SYST BP LT 130 MM HG: CPT | Performed by: FAMILY MEDICINE

## 2023-02-23 PROCEDURE — G8427 DOCREV CUR MEDS BY ELIG CLIN: HCPCS | Performed by: FAMILY MEDICINE

## 2023-02-23 PROCEDURE — 1123F ACP DISCUSS/DSCN MKR DOCD: CPT | Performed by: FAMILY MEDICINE

## 2023-02-23 PROCEDURE — G8484 FLU IMMUNIZE NO ADMIN: HCPCS | Performed by: FAMILY MEDICINE

## 2023-02-23 PROCEDURE — G8417 CALC BMI ABV UP PARAM F/U: HCPCS | Performed by: FAMILY MEDICINE

## 2023-02-23 PROCEDURE — 99214 OFFICE O/P EST MOD 30 MIN: CPT | Performed by: FAMILY MEDICINE

## 2023-02-23 PROCEDURE — 1036F TOBACCO NON-USER: CPT | Performed by: FAMILY MEDICINE

## 2023-02-23 PROCEDURE — 3078F DIAST BP <80 MM HG: CPT | Performed by: FAMILY MEDICINE

## 2023-02-23 RX ORDER — AZITHROMYCIN 250 MG/1
TABLET, FILM COATED ORAL
Qty: 6 TABLET | Refills: 0 | Status: SHIPPED | OUTPATIENT
Start: 2023-02-23

## 2023-02-23 RX ORDER — AZITHROMYCIN 500 MG/1
500 TABLET, FILM COATED ORAL DAILY
Qty: 1 PACKET | Refills: 0 | Status: CANCELLED | OUTPATIENT
Start: 2023-02-23 | End: 2023-02-26

## 2023-02-23 SDOH — ECONOMIC STABILITY: INCOME INSECURITY: HOW HARD IS IT FOR YOU TO PAY FOR THE VERY BASICS LIKE FOOD, HOUSING, MEDICAL CARE, AND HEATING?: NOT HARD AT ALL

## 2023-02-23 SDOH — ECONOMIC STABILITY: FOOD INSECURITY: WITHIN THE PAST 12 MONTHS, YOU WORRIED THAT YOUR FOOD WOULD RUN OUT BEFORE YOU GOT MONEY TO BUY MORE.: NEVER TRUE

## 2023-02-23 SDOH — ECONOMIC STABILITY: HOUSING INSECURITY
IN THE LAST 12 MONTHS, WAS THERE A TIME WHEN YOU DID NOT HAVE A STEADY PLACE TO SLEEP OR SLEPT IN A SHELTER (INCLUDING NOW)?: NO

## 2023-02-23 SDOH — ECONOMIC STABILITY: FOOD INSECURITY: WITHIN THE PAST 12 MONTHS, THE FOOD YOU BOUGHT JUST DIDN'T LAST AND YOU DIDN'T HAVE MONEY TO GET MORE.: NEVER TRUE

## 2023-02-23 ASSESSMENT — PATIENT HEALTH QUESTIONNAIRE - PHQ9
SUM OF ALL RESPONSES TO PHQ QUESTIONS 1-9: 0
1. LITTLE INTEREST OR PLEASURE IN DOING THINGS: 0
SUM OF ALL RESPONSES TO PHQ QUESTIONS 1-9: 0
SUM OF ALL RESPONSES TO PHQ QUESTIONS 1-9: 0
2. FEELING DOWN, DEPRESSED OR HOPELESS: 0
SUM OF ALL RESPONSES TO PHQ QUESTIONS 1-9: 0
SUM OF ALL RESPONSES TO PHQ9 QUESTIONS 1 & 2: 0

## 2023-02-23 ASSESSMENT — ENCOUNTER SYMPTOMS
SHORTNESS OF BREATH: 0
BLOOD IN STOOL: 0
RHINORRHEA: 1
SORE THROAT: 1
COUGH: 1
NAUSEA: 0
VOMITING: 0
ABDOMINAL PAIN: 0

## 2023-02-23 NOTE — PROGRESS NOTES
Chief Complaint   Patient presents with    Follow-up     Pt is here for a 4 month f/u     Pharyngitis     Pt is c/o a sore throat x 4-5 days. Fatigue     Pt is c/o fatigue x 1 year     Shortness of Breath     Pt is c/o shortess of breath x 1 year         Timothy Fatima is a [de-identified] y.o. male who presents for routine visit. Pt does report a sore throat, PND and a dry cough x 5 days    Patient did recent CT scan cervical spine which showed decreased bone density with multilevel degenerative disc disease and patient needs to start taking OTC Oscal D 1 pill twice a day along with Rx naprosyn as needed for pain. Patient also had signs of extensive atherosclerosis (plaque buildup) in his R carotid artery and needs to continue on his lipitor medication. Finally patient had an 8 mm lesion noted behind his R thyroid lobe and will check an US thyroid and bloodwork for further evaluation --- Pt reports resolution of his neck pain with Rx medrol dose devendra and has been taking supplemental calcium. --- Patient did recent thyroid US which was normal; and in particular showed no nodules.  --- Patient did recent calcium and parathyroid hormone level bloodwork which was within normal limits     Pt has a hx of diabetes and is on glucotrol, januvia and lantus insulin 10 units BID and reports FBS readings around 100     Patient has a past medical history significant for CAD s/p CABG 2004, HTN and A fib s/p ablation 08/2021 and is followed by Cardiology and is on norvasc, toprol, lipitor and eliquis medications and had recent cardiac stress test which was normal and had recent office visit and was felt to be stable but was ordered a 2D Echo      Pt has a hx of carotid stenosis s/p L CEA     Pt denies any hx of stroke, CHF or kidney disease     Pt reports a normal colonoscopy about 5 year sago     Pt is a nonsmoker and denies any alcohol use     Pt did complete his COVID vaccine series      2D Echo 02/10/2023    Left Ventricle: Left ventricle size is normal. Mildly increased wall   thickness. No wall motion abnormalities noted. Normal systolic function   with a visually estimated EF of 65 - 70%. Grade III diastolic dysfunction. Left Atrium: Left atrium is moderately dilated. Aortic Valve: Mild transvalvular regurgitation. No stenosis. Mitral Valve: Mild transvalvular regurgitation. No stenosis. Tricuspid Valve: Mild transvalvular regurgitation. IVC/SVC: Mild pulmonary hypertension is present. Review of Systems   Constitutional:  Positive for fatigue. Negative for chills and fever. HENT:  Positive for rhinorrhea and sore throat. Negative for nosebleeds and postnasal drip. Eyes:         Negative blurred vision or diplopia   Respiratory:  Positive for cough (dry). Negative for shortness of breath. Positive SOMERS at times   Cardiovascular:  Negative for chest pain, palpitations and leg swelling. Gastrointestinal:  Negative for abdominal pain, blood in stool, nausea and vomiting. Negative melena or indigestion   Endocrine: Negative for polydipsia and polyuria. Genitourinary:  Negative for dysuria and hematuria. Positive nocturia x 3   Musculoskeletal:  Negative for arthralgias. Skin:  Negative for rash. Neurological:  Negative for dizziness, seizures, syncope, speech difficulty, weakness and headaches. Positive paresthesias R hand at times    Psychiatric/Behavioral:  Negative for dysphoric mood. The patient is not nervous/anxious. Vitals:    02/23/23 1323   BP: 134/64   Pulse: 71   Resp: 18   Temp: 98 °F (36.7 °C)   SpO2: 95%         Physical Exam  Vitals reviewed. Constitutional:       General: He is not in acute distress. HENT:      Nose:      Comments: No sinus tendernous to percussion noted B/L     Mouth/Throat:      Mouth: Mucous membranes are moist.      Pharynx: Oropharynx is clear. Eyes:      General: No scleral icterus.      Comments: Pink conjunctivae    Neck: Thyroid: No thyromegaly. Cardiovascular:      Heart sounds: Normal heart sounds. No murmur heard. No friction rub. No gallop. Pulmonary:      Effort: Pulmonary effort is normal.      Breath sounds: Normal breath sounds. Abdominal:      Palpations: Abdomen is soft. Tenderness: There is no abdominal tenderness. Musculoskeletal:      Comments: Positive  4+ leg edema but no calf tendernous to palpation noted B/L   Lymphadenopathy:      Cervical: No cervical adenopathy. Skin:     Findings: No rash. Neurological:      Mental Status: He is alert. Comments: Cranial nerves grossly intact and muscle strength 5/5 throughout   Psychiatric:         Mood and Affect: Mood normal.       ASSESSMENT AND PLAN    1. Controlled type 2 diabetes mellitus without complication, with long-term current use of insulin ([address]  Patient clinically stable on present medications    2. Bilateral carotid artery stenosis  Patient clinically stable on present medications and is with a nonfocal neuro exam    3. Atherosclerosis of native coronary artery of native heart without angina pectoris  Patient clinically stable on present medications and denies any CP or SOB    4. Primary hypertension  Patient clinically stable on present medications and had recent 2D Echo done which showed normal systolic function with EF of 65 - 70% along with Grade III diastolic dysfunction; which may be the cause of his chronic leg edema and pt was told to call his Cardiologist for their input as pt may benefit from a diuretic. Pt is with clear lung fields on PE    5. Acute pharyngitis, unspecified etiology  Will treat patient with antibiotics and patient was told to notify MD if you note no improvement in 3 - 4 days  - azithromycin (ZITHROMAX) 250 MG tablet; Take 2 pills by mouth on first day then 1 pill by mouth daily for 4 more days  Dispense: 6 tablet; Refill: Fitz Villa MD    Return in about 3 months (around 5/23/2023).        Patient Instructions     Go to the ER ASAP if you develop any chest pain, shortness of breath, facial droop, slurred speech, weakness/numbness in your arms or legs or double vision!

## 2023-04-11 RX ORDER — PEN NEEDLE, DIABETIC 29 G X1/2"
NEEDLE, DISPOSABLE MISCELLANEOUS
Qty: 200 EACH | Refills: 2 | Status: SHIPPED | OUTPATIENT
Start: 2023-04-11

## 2023-04-12 RX ORDER — INSULIN GLARGINE 100 [IU]/ML
INJECTION, SOLUTION SUBCUTANEOUS
Qty: 30 ML | Refills: 2 | Status: SHIPPED | OUTPATIENT
Start: 2023-04-12

## 2023-05-23 ENCOUNTER — OFFICE VISIT (OUTPATIENT)
Dept: PRIMARY CARE CLINIC | Age: 78
End: 2023-05-23
Payer: COMMERCIAL

## 2023-05-23 ENCOUNTER — HOSPITAL ENCOUNTER (OUTPATIENT)
Dept: GENERAL RADIOLOGY | Age: 78
Discharge: HOME | End: 2023-05-23
Attending: FAMILY MEDICINE
Payer: COMMERCIAL

## 2023-05-23 VITALS
OXYGEN SATURATION: 93 % | DIASTOLIC BLOOD PRESSURE: 80 MMHG | HEIGHT: 65 IN | SYSTOLIC BLOOD PRESSURE: 152 MMHG | BODY MASS INDEX: 27.16 KG/M2 | RESPIRATION RATE: 16 BRPM | HEART RATE: 72 BPM | WEIGHT: 163 LBS | TEMPERATURE: 98.5 F

## 2023-05-23 DIAGNOSIS — I25.10 ATHEROSCLEROSIS OF NATIVE CORONARY ARTERY OF NATIVE HEART WITHOUT ANGINA PECTORIS: ICD-10-CM

## 2023-05-23 DIAGNOSIS — I65.23 BILATERAL CAROTID ARTERY STENOSIS: ICD-10-CM

## 2023-05-23 DIAGNOSIS — R60.0 BILATERAL LEG EDEMA: ICD-10-CM

## 2023-05-23 DIAGNOSIS — I10 PRIMARY HYPERTENSION: ICD-10-CM

## 2023-05-23 DIAGNOSIS — Z79.4 CONTROLLED TYPE 2 DIABETES MELLITUS WITHOUT COMPLICATION, WITH LONG-TERM CURRENT USE OF INSULIN (HCC): Primary | ICD-10-CM

## 2023-05-23 DIAGNOSIS — E11.9 CONTROLLED TYPE 2 DIABETES MELLITUS WITHOUT COMPLICATION, WITH LONG-TERM CURRENT USE OF INSULIN (HCC): Primary | ICD-10-CM

## 2023-05-23 LAB — HBA1C MFR BLD: 6.3 %

## 2023-05-23 PROCEDURE — 3078F DIAST BP <80 MM HG: CPT | Performed by: FAMILY MEDICINE

## 2023-05-23 PROCEDURE — 1123F ACP DISCUSS/DSCN MKR DOCD: CPT | Performed by: FAMILY MEDICINE

## 2023-05-23 PROCEDURE — 83036 HEMOGLOBIN GLYCOSYLATED A1C: CPT | Performed by: FAMILY MEDICINE

## 2023-05-23 PROCEDURE — 71046 X-RAY EXAM CHEST 2 VIEWS: CPT

## 2023-05-23 PROCEDURE — 1036F TOBACCO NON-USER: CPT | Performed by: FAMILY MEDICINE

## 2023-05-23 PROCEDURE — 3074F SYST BP LT 130 MM HG: CPT | Performed by: FAMILY MEDICINE

## 2023-05-23 PROCEDURE — G8417 CALC BMI ABV UP PARAM F/U: HCPCS | Performed by: FAMILY MEDICINE

## 2023-05-23 PROCEDURE — 99214 OFFICE O/P EST MOD 30 MIN: CPT | Performed by: FAMILY MEDICINE

## 2023-05-23 PROCEDURE — G8427 DOCREV CUR MEDS BY ELIG CLIN: HCPCS | Performed by: FAMILY MEDICINE

## 2023-05-23 PROCEDURE — 3044F HG A1C LEVEL LT 7.0%: CPT | Performed by: FAMILY MEDICINE

## 2023-05-23 ASSESSMENT — ENCOUNTER SYMPTOMS
SORE THROAT: 0
ABDOMINAL PAIN: 0
SHORTNESS OF BREATH: 0
NAUSEA: 0
VOMITING: 0
COUGH: 0
BLOOD IN STOOL: 0

## 2023-05-25 DIAGNOSIS — I65.23 BILATERAL CAROTID ARTERY STENOSIS: ICD-10-CM

## 2023-05-25 DIAGNOSIS — I25.10 ATHEROSCLEROSIS OF NATIVE CORONARY ARTERY OF NATIVE HEART WITHOUT ANGINA PECTORIS: ICD-10-CM

## 2023-05-25 DIAGNOSIS — R60.0 BILATERAL LEG EDEMA: ICD-10-CM

## 2023-05-25 DIAGNOSIS — I10 PRIMARY HYPERTENSION: ICD-10-CM

## 2023-05-25 LAB
ALBUMIN SERPL-MCNC: 4.3 G/DL (ref 3.4–5)
ALBUMIN/GLOB SERPL: 1.9 {RATIO} (ref 1.1–2.2)
ALP SERPL-CCNC: 60 U/L (ref 40–129)
ALT SERPL-CCNC: 24 U/L (ref 10–40)
ANION GAP SERPL CALCULATED.3IONS-SCNC: 13 MMOL/L (ref 3–16)
AST SERPL-CCNC: 29 U/L (ref 15–37)
BASOPHILS # BLD: 0 K/UL (ref 0–0.2)
BASOPHILS NFR BLD: 0.5 %
BILIRUB SERPL-MCNC: 0.8 MG/DL (ref 0–1)
BUN SERPL-MCNC: 27 MG/DL (ref 7–20)
CALCIUM SERPL-MCNC: 9.2 MG/DL (ref 8.3–10.6)
CHLORIDE SERPL-SCNC: 104 MMOL/L (ref 99–110)
CHOLEST SERPL-MCNC: 135 MG/DL (ref 0–199)
CO2 SERPL-SCNC: 24 MMOL/L (ref 21–32)
CREAT SERPL-MCNC: 1.1 MG/DL (ref 0.8–1.3)
DEPRECATED RDW RBC AUTO: 14.3 % (ref 12.4–15.4)
EOSINOPHIL # BLD: 0.1 K/UL (ref 0–0.6)
EOSINOPHIL NFR BLD: 1.4 %
GFR SERPLBLD CREATININE-BSD FMLA CKD-EPI: >60 ML/MIN/{1.73_M2}
GLUCOSE SERPL-MCNC: 96 MG/DL (ref 70–99)
HCT VFR BLD AUTO: 42.7 % (ref 40.5–52.5)
HDLC SERPL-MCNC: 64 MG/DL (ref 40–60)
HGB BLD-MCNC: 14.4 G/DL (ref 13.5–17.5)
LDLC SERPL CALC-MCNC: 56 MG/DL
LYMPHOCYTES # BLD: 1.5 K/UL (ref 1–5.1)
LYMPHOCYTES NFR BLD: 23 %
MCH RBC QN AUTO: 28.1 PG (ref 26–34)
MCHC RBC AUTO-ENTMCNC: 33.7 G/DL (ref 31–36)
MCV RBC AUTO: 83.3 FL (ref 80–100)
MONOCYTES # BLD: 0.6 K/UL (ref 0–1.3)
MONOCYTES NFR BLD: 9 %
NEUTROPHILS # BLD: 4.4 K/UL (ref 1.7–7.7)
NEUTROPHILS NFR BLD: 66.1 %
PLATELET # BLD AUTO: 133 K/UL (ref 135–450)
PMV BLD AUTO: 8.8 FL (ref 5–10.5)
POTASSIUM SERPL-SCNC: 4.3 MMOL/L (ref 3.5–5.1)
PROT SERPL-MCNC: 6.6 G/DL (ref 6.4–8.2)
RBC # BLD AUTO: 5.13 M/UL (ref 4.2–5.9)
SODIUM SERPL-SCNC: 141 MMOL/L (ref 136–145)
TRIGL SERPL-MCNC: 75 MG/DL (ref 0–150)
TSH SERPL DL<=0.005 MIU/L-ACNC: 0.92 UIU/ML (ref 0.27–4.2)
VLDLC SERPL CALC-MCNC: 15 MG/DL
WBC # BLD AUTO: 6.7 K/UL (ref 4–11)

## 2023-06-19 RX ORDER — GLIPIZIDE 5 MG/1
TABLET ORAL
Qty: 90 TABLET | Refills: 1 | Status: SHIPPED | OUTPATIENT
Start: 2023-06-19

## 2023-06-19 NOTE — TELEPHONE ENCOUNTER
Medication:   Requested Prescriptions     Pending Prescriptions Disp Refills    glipiZIDE (GLUCOTROL) 5 MG tablet [Pharmacy Med Name: glipiZIDE 5 MG Oral Tablet] 90 tablet 3     Sig: TAKE 1 TABLET BY MOUTH DAILY        Last Filled: 01/11/2023 #90 with 1 refill      Patient Phone Number: [phone] (home)     Last appt: 5/23/2023   Next appt: 7/26/2023    Last OARRS: No flowsheet data found.

## 2023-07-26 ENCOUNTER — OFFICE VISIT (OUTPATIENT)
Dept: PRIMARY CARE CLINIC | Age: 78
End: 2023-07-26
Payer: COMMERCIAL

## 2023-07-26 VITALS
RESPIRATION RATE: 16 BRPM | HEART RATE: 60 BPM | DIASTOLIC BLOOD PRESSURE: 60 MMHG | SYSTOLIC BLOOD PRESSURE: 150 MMHG | WEIGHT: 160 LBS | BODY MASS INDEX: 26.66 KG/M2 | TEMPERATURE: 97.6 F | HEIGHT: 65 IN | OXYGEN SATURATION: 98 %

## 2023-07-26 DIAGNOSIS — I25.10 ATHEROSCLEROSIS OF NATIVE CORONARY ARTERY OF NATIVE HEART WITHOUT ANGINA PECTORIS: Primary | ICD-10-CM

## 2023-07-26 DIAGNOSIS — I10 PRIMARY HYPERTENSION: ICD-10-CM

## 2023-07-26 DIAGNOSIS — I65.23 BILATERAL CAROTID ARTERY STENOSIS: ICD-10-CM

## 2023-07-26 DIAGNOSIS — Z79.4 TYPE 2 DIABETES MELLITUS WITHOUT COMPLICATION, WITH LONG-TERM CURRENT USE OF INSULIN (HCC): ICD-10-CM

## 2023-07-26 DIAGNOSIS — R21 RASH: ICD-10-CM

## 2023-07-26 DIAGNOSIS — R60.0 BILATERAL LEG EDEMA: ICD-10-CM

## 2023-07-26 DIAGNOSIS — E11.9 TYPE 2 DIABETES MELLITUS WITHOUT COMPLICATION, WITH LONG-TERM CURRENT USE OF INSULIN (HCC): ICD-10-CM

## 2023-07-26 PROCEDURE — 3044F HG A1C LEVEL LT 7.0%: CPT | Performed by: FAMILY MEDICINE

## 2023-07-26 PROCEDURE — 99214 OFFICE O/P EST MOD 30 MIN: CPT | Performed by: FAMILY MEDICINE

## 2023-07-26 PROCEDURE — 3078F DIAST BP <80 MM HG: CPT | Performed by: FAMILY MEDICINE

## 2023-07-26 PROCEDURE — 1123F ACP DISCUSS/DSCN MKR DOCD: CPT | Performed by: FAMILY MEDICINE

## 2023-07-26 PROCEDURE — 1036F TOBACCO NON-USER: CPT | Performed by: FAMILY MEDICINE

## 2023-07-26 PROCEDURE — G8417 CALC BMI ABV UP PARAM F/U: HCPCS | Performed by: FAMILY MEDICINE

## 2023-07-26 PROCEDURE — G8427 DOCREV CUR MEDS BY ELIG CLIN: HCPCS | Performed by: FAMILY MEDICINE

## 2023-07-26 PROCEDURE — 3074F SYST BP LT 130 MM HG: CPT | Performed by: FAMILY MEDICINE

## 2023-07-26 ASSESSMENT — ENCOUNTER SYMPTOMS
COUGH: 0
ABDOMINAL PAIN: 0
SORE THROAT: 0
NAUSEA: 0
SHORTNESS OF BREATH: 1
WHEEZING: 0
BLOOD IN STOOL: 0
RHINORRHEA: 1
VOMITING: 0

## 2023-08-21 ENCOUNTER — TELEPHONE (OUTPATIENT)
Dept: PRIMARY CARE CLINIC | Age: 78
End: 2023-08-21

## 2023-08-21 NOTE — TELEPHONE ENCOUNTER
Tell patient to do a home COVID test and to call the office with the results and patient can be scheduled for a virtual visit tomorrow

## 2023-08-21 NOTE — TELEPHONE ENCOUNTER
Pt called in stating he has mucus  in his chest, a sore throat, very tired and feeling lousy    Would like a call back with what Dr. Lian Russell suggests he should do

## 2023-08-22 ENCOUNTER — TELEMEDICINE (OUTPATIENT)
Dept: PRIMARY CARE CLINIC | Age: 78
End: 2023-08-22

## 2023-08-22 DIAGNOSIS — J01.90 ACUTE SINUSITIS, RECURRENCE NOT SPECIFIED, UNSPECIFIED LOCATION: Primary | ICD-10-CM

## 2023-08-22 ASSESSMENT — ENCOUNTER SYMPTOMS
ABDOMINAL PAIN: 0
SHORTNESS OF BREATH: 0
VOMITING: 0
COUGH: 1
NAUSEA: 0
SORE THROAT: 0

## 2023-08-22 NOTE — PROGRESS NOTES
exanthematous lesions or discoloration noted on facial skin         [] Abnormal-            Psychiatric:       [] Normal Affect [] No Hallucinations        [] Abnormal-     Other pertinent observable physical exam findings-     ASSESSMENT/PLAN:  1. Acute sinusitis, recurrence not specified, unspecified location  Pt clinically improving on antibiotics and mucinex DM medication and was told to go to the ER ASAP if you develop any CP or SOB! Return in about 3 months (around 11/22/2023). Jasvir Sheikh, was evaluated through a synchronous (real-time) audio-video encounter. The patient (or guardian if applicable) is aware that this is a billable service, which includes applicable co-pays. This Virtual Visit was conducted with patient's (and/or legal guardian's) consent. Patient identification was verified, and a caregiver was present when appropriate. The patient was located at Home: [address] [facility] [address]  Provider was located at [facility] (Appt Dept): [address] [facility],  [address] [facility]        Total time spent on this encounter: Not billed by time    --Sharon Holden MD on 8/22/2023 at 11:23 AM    An electronic signature was used to authenticate this note.

## 2023-08-24 DIAGNOSIS — E11.9 CONTROLLED TYPE 2 DIABETES MELLITUS WITHOUT COMPLICATION, WITH LONG-TERM CURRENT USE OF INSULIN (HCC): ICD-10-CM

## 2023-08-24 DIAGNOSIS — Z79.4 CONTROLLED TYPE 2 DIABETES MELLITUS WITHOUT COMPLICATION, WITH LONG-TERM CURRENT USE OF INSULIN (HCC): ICD-10-CM

## 2023-08-25 RX ORDER — BLOOD SUGAR DIAGNOSTIC
STRIP MISCELLANEOUS
Qty: 200 STRIP | Refills: 3 | Status: SHIPPED | OUTPATIENT
Start: 2023-08-25

## 2023-08-25 NOTE — TELEPHONE ENCOUNTER
Medication:   Requested Prescriptions     Pending Prescriptions Disp Refills    ACCU-CHEK BRITNEY PLUS strip [Pharmacy Med Name: Accu-Chek Britney Plus In Vitro Strip] 200 strip 3     Sig: TEST TWICE DAILY FOR  DIABETES        Last Filled: 09/08/2022 #200 with 3 refills     Patient Phone Number: [phone] (home)     Last appt: 8/22/2023   Next appt: 11/29/2023    Last OARRS: No flowsheet data found.

## 2023-10-05 RX ORDER — ATORVASTATIN CALCIUM 20 MG/1
TABLET, FILM COATED ORAL
Qty: 90 TABLET | Refills: 1 | Status: SHIPPED | OUTPATIENT
Start: 2023-10-05

## 2023-10-05 NOTE — TELEPHONE ENCOUNTER
Medication:   Requested Prescriptions     Pending Prescriptions Disp Refills    atorvastatin (LIPITOR) 20 MG tablet [Pharmacy Med Name: Atorvastatin Calcium 20 MG Oral Tablet] 90 tablet 3     Sig: TAKE 1 TABLET BY MOUTH ONCE  DAILY       Last Filled: 04/18/2023 #90 with 1 refill     Patient Phone Number: [phone] (home)     Last appt: 8/22/2023   Next appt: 11/29/2023    Last Lipid:   Lab Results   Component Value Date/Time    CHOL 135 05/25/2023 09:54 AM    TRIG 75 05/25/2023 09:54 AM    HDL 64 05/25/2023 09:54 AM    HDL 44 03/14/2012 11:27 AM    LDLCALC 56 05/25/2023 09:54 AM

## 2023-10-30 ENCOUNTER — APPOINTMENT (RX ONLY)
Dept: URBAN - METROPOLITAN AREA CLINIC 170 | Facility: CLINIC | Age: 78
Setting detail: DERMATOLOGY
End: 2023-10-30

## 2023-10-30 DIAGNOSIS — L21.8 OTHER SEBORRHEIC DERMATITIS: ICD-10-CM

## 2023-10-30 DIAGNOSIS — L30.8 OTHER SPECIFIED DERMATITIS: ICD-10-CM

## 2023-10-30 DIAGNOSIS — D18.0 HEMANGIOMA: ICD-10-CM

## 2023-10-30 DIAGNOSIS — L81.4 OTHER MELANIN HYPERPIGMENTATION: ICD-10-CM

## 2023-10-30 DIAGNOSIS — D22 MELANOCYTIC NEVI: ICD-10-CM

## 2023-10-30 DIAGNOSIS — B35.1 TINEA UNGUIUM: ICD-10-CM

## 2023-10-30 DIAGNOSIS — L82.1 OTHER SEBORRHEIC KERATOSIS: ICD-10-CM

## 2023-10-30 DIAGNOSIS — L85.3 XEROSIS CUTIS: ICD-10-CM

## 2023-10-30 PROBLEM — D22.5 MELANOCYTIC NEVI OF TRUNK: Status: ACTIVE | Noted: 2023-10-30

## 2023-10-30 PROBLEM — D18.01 HEMANGIOMA OF SKIN AND SUBCUTANEOUS TISSUE: Status: ACTIVE | Noted: 2023-10-30

## 2023-10-30 PROCEDURE — ? PRESCRIPTION

## 2023-10-30 PROCEDURE — ? PRESCRIPTION MEDICATION MANAGEMENT

## 2023-10-30 PROCEDURE — 99204 OFFICE O/P NEW MOD 45 MIN: CPT

## 2023-10-30 PROCEDURE — ? TREATMENT REGIMEN

## 2023-10-30 PROCEDURE — ? COUNSELING

## 2023-10-30 RX ORDER — KETOCONAZOLE 20 MG/ML
SHAMPOO, SUSPENSION TOPICAL
Qty: 120 | Refills: 3 | Status: ERX | COMMUNITY
Start: 2023-10-30

## 2023-10-30 RX ORDER — TRIAMCINOLONE ACETONIDE 1 MG/G
OINTMENT TOPICAL
Qty: 453.6 | Refills: 1 | Status: ERX | COMMUNITY
Start: 2023-10-30

## 2023-10-30 RX ADMIN — TRIAMCINOLONE ACETONIDE 1: 1 OINTMENT TOPICAL at 00:00

## 2023-10-30 RX ADMIN — KETOCONAZOLE 2: 20 SHAMPOO, SUSPENSION TOPICAL at 00:00

## 2023-10-30 ASSESSMENT — LOCATION DETAILED DESCRIPTION DERM
LOCATION DETAILED: LEFT POSTERIOR SHOULDER
LOCATION DETAILED: LEFT MEDIAL BREAST 10-11:00 REGION
LOCATION DETAILED: LEFT DISTAL PRETIBIAL REGION
LOCATION DETAILED: MIDDLE STERNUM
LOCATION DETAILED: STERNAL NOTCH
LOCATION DETAILED: HAIR
LOCATION DETAILED: RIGHT DISTAL DORSAL FOREARM
LOCATION DETAILED: LEFT GREAT TOENAIL
LOCATION DETAILED: RIGHT LATERAL SUPERIOR CHEST

## 2023-10-30 ASSESSMENT — LOCATION ZONE DERM
LOCATION ZONE: SCALP
LOCATION ZONE: LEG
LOCATION ZONE: ARM
LOCATION ZONE: TOENAIL
LOCATION ZONE: TRUNK

## 2023-10-30 ASSESSMENT — NAIL INVOLVEMENT PERCENT: % OF NAIL(S) INVOLVED WITH INFECTION: 27

## 2023-10-30 ASSESSMENT — LOCATION SIMPLE DESCRIPTION DERM
LOCATION SIMPLE: LEFT BREAST
LOCATION SIMPLE: CHEST
LOCATION SIMPLE: LEFT PRETIBIAL REGION
LOCATION SIMPLE: HAIR
LOCATION SIMPLE: LEFT GREAT TOE
LOCATION SIMPLE: RIGHT FOREARM
LOCATION SIMPLE: LEFT SHOULDER

## 2023-10-30 ASSESSMENT — ITCH NUMERIC RATING SCALE: HOW SEVERE IS YOUR ITCHING?: 2

## 2023-10-30 NOTE — PROCEDURE: PRESCRIPTION MEDICATION MANAGEMENT
Samples Given: Eucerin, Vanicream moisturizer
Render In Strict Bullet Format?: No
Detail Level: Zone
Initiate Treatment: Triamcinolone cream bid up to 2 weeks a month
Initiate Treatment: Shampoo with Ketoconazole 2-3x week, allowing to sit on scalp for 10-15mins before rinising

## 2023-10-30 NOTE — HPI: EVALUATION OF SKIN LESION(S)
What Type Of Note Output Would You Prefer (Optional)?: Bullet Format
Hpi Title: Evaluation of Skin Lesions
Have Your Spot(S) Been Treated In The Past?: has not been treated
Additional History: Also some sporadic rash at times throughout body, now rash like and dryness on hands and legs

## 2023-10-30 NOTE — PROCEDURE: MIPS QUALITY
Quality 226: Preventive Care And Screening: Tobacco Use: Screening And Cessation Intervention: Patient screened for tobacco use and is an ex/non-smoker
Detail Level: Detailed
Quality 111:Pneumonia Vaccination Status For Older Adults: Patient received any pneumococcal conjugate or polysaccharide vaccine on or after their 60th birthday and before the end of the measurement period
Quality 130: Documentation Of Current Medications In The Medical Record: Current Medications Documented
Additional Notes: PCP Brent Kimball
Quality 431: Preventive Care And Screening: Unhealthy Alcohol Use - Screening: Patient not identified as an unhealthy alcohol user when screened for unhealthy alcohol use using a systematic screening method

## 2023-11-24 RX ORDER — GLIPIZIDE 5 MG/1
TABLET ORAL
Qty: 30 TABLET | Refills: 0 | Status: SHIPPED | OUTPATIENT
Start: 2023-11-24

## 2023-11-24 NOTE — TELEPHONE ENCOUNTER
Medication:   Requested Prescriptions     Pending Prescriptions Disp Refills    glipiZIDE (GLUCOTROL) 5 MG tablet [Pharmacy Med Name: glipiZIDE 5 MG Oral Tablet] 90 tablet 3     Sig: TAKE 1 TABLET BY MOUTH DAILY       Last Filled: 06/19/2023 #90 with 1 refill     Patient Phone Number: [phone] (home)     Last appt: 8/22/2023   Next appt: 11/29/2023    Last Labs DM:   Lab Results   Component Value Date/Time    LABA1C 6.3 05/23/2023 01:31 PM

## 2024-04-15 NOTE — TELEPHONE ENCOUNTER
Naima sent PA request for Aspart FlexPen 100unit/ML pen injectors via fax. Scanned and routed to PA department. done

## 2024-07-11 ENCOUNTER — APPOINTMENT (RX ONLY)
Dept: URBAN - METROPOLITAN AREA CLINIC 170 | Facility: CLINIC | Age: 79
Setting detail: DERMATOLOGY
End: 2024-07-11

## 2024-07-11 DIAGNOSIS — L82.1 OTHER SEBORRHEIC KERATOSIS: ICD-10-CM | Status: STABLE

## 2024-07-11 DIAGNOSIS — L21.8 OTHER SEBORRHEIC DERMATITIS: ICD-10-CM | Status: STABLE

## 2024-07-11 DIAGNOSIS — L30.8 OTHER SPECIFIED DERMATITIS: ICD-10-CM

## 2024-07-11 PROCEDURE — 99214 OFFICE O/P EST MOD 30 MIN: CPT

## 2024-07-11 PROCEDURE — ? MDM - TREATMENT GOALS

## 2024-07-11 PROCEDURE — ? COUNSELING

## 2024-07-11 PROCEDURE — ? PRESCRIPTION MEDICATION MANAGEMENT

## 2024-07-11 ASSESSMENT — SEVERITY ASSESSMENT: HOW SEVERE IS THIS PATIENT'S CONDITION?: CLEAR

## 2024-07-11 ASSESSMENT — LOCATION SIMPLE DESCRIPTION DERM
LOCATION SIMPLE: RIGHT CHEST
LOCATION SIMPLE: RIGHT THIGH
LOCATION SIMPLE: POSTERIOR SCALP
LOCATION SIMPLE: LEFT CHEST
LOCATION SIMPLE: ABDOMEN
LOCATION SIMPLE: LEFT SHOULDER
LOCATION SIMPLE: LEFT THIGH

## 2024-07-11 ASSESSMENT — LOCATION DETAILED DESCRIPTION DERM
LOCATION DETAILED: RIGHT ANTERIOR PROXIMAL THIGH
LOCATION DETAILED: LEFT ANTERIOR PROXIMAL THIGH
LOCATION DETAILED: LEFT AREOLA
LOCATION DETAILED: LEFT POSTERIOR SHOULDER
LOCATION DETAILED: POSTERIOR MID-PARIETAL SCALP
LOCATION DETAILED: RIGHT AREOLA
LOCATION DETAILED: LEFT RIB CAGE

## 2024-07-11 ASSESSMENT — LOCATION ZONE DERM
LOCATION ZONE: ARM
LOCATION ZONE: LEG
LOCATION ZONE: SCALP
LOCATION ZONE: TRUNK

## 2024-07-11 NOTE — PROCEDURE: PRESCRIPTION MEDICATION MANAGEMENT
Stable on therapy   x   Sees gynecologist
Render In Strict Bullet Format?: No
Continue Regimen: Ketoconazole 2% shampoo Wash scalp once a week and let sit for 4 minutes before rinsing
Detail Level: Zone
Continue Regimen: Triamcinolone 0.1% ointment Apply twice daily to affected areas for up to 2 weeks. Stop for 1 week. Repeat as needed for itch. Continue moisturizing with lotions.

## 2024-07-11 NOTE — HPI: RASH
What Type Of Note Output Would You Prefer (Optional)?: Bullet Format
How Severe Is Your Rash?: mild
Is This A New Presentation, Or A Follow-Up?: Rash
Additional History: Saw another Dermatologist and was told to use OTC cortisone. Uses it everyday and it doesn’t help.

## (undated) DEVICE — GOWN SIRUS NONREIN XL W/TWL: Brand: MEDLINE INDUSTRIES, INC.

## (undated) DEVICE — MAGNETIC INSTRUMENT PAD 10" X 16"; MEDIUM; DISPOSABLE: Brand: CARDINAL HEALTH

## (undated) DEVICE — SUTURE NONABSORBABLE MONOFILAMENT 6-0 BV-1 1X30 IN PROLENE 8709H

## (undated) DEVICE — STAPLER EXT SKIN 35 WIDE S STL STPL SQUEEZE HNDL VISISTAT

## (undated) DEVICE — JP PERF DRN SIL FLT 7MM FULL: Brand: CARDINAL HEALTH

## (undated) DEVICE — STANDARD HYPODERMIC NEEDLE,POLYPROPYLENE HUB: Brand: MONOJECT

## (undated) DEVICE — SUTURE PERMA-HAND SZ 3-0 L18IN NONABSORBABLE BLK RB-1 L17MM C053D

## (undated) DEVICE — SUTURE NONABSORBABLE MONOFILAMENT 7-0 BV-1 1X24 IN PROLENE 8702H

## (undated) DEVICE — SUTURE VCRL + SZ 2-0 L27IN ABSRB CLR CT-1 1/2 CIR TAPERCUT VCP259H

## (undated) DEVICE — COVER US PRB W12XL244CM SURGICAL INTRAOPERATIVE PLAS TAPR L

## (undated) DEVICE — 1010 S-DRAPE TOWEL DRAPE 10/BX: Brand: STERI-DRAPE™

## (undated) DEVICE — GOWN SIRUS NONREIN LG W/TWL: Brand: MEDLINE INDUSTRIES, INC.

## (undated) DEVICE — 3M™ TEGADERM™ TRANSPARENT FILM DRESSING FRAME STYLE, 1624W, 2-3/8 IN X 2-3/4 IN (6 CM X 7 CM), 100/CT 4CT/CASE: Brand: 3M™ TEGADERM™

## (undated) DEVICE — Device: Brand: MEDEX

## (undated) DEVICE — SUTURE VCRL + SZ 3-0 L27IN ABSRB UD L26MM SH 1/2 CIR VCP416H

## (undated) DEVICE — SURGIFOAM SPNG SZ 12-7

## (undated) DEVICE — SOLUTION IV IRRIG POUR BRL 0.9% SODIUM CHL 2F7124

## (undated) DEVICE — GAUZE,SPONGE,2"X2",8PLY,STERILE,LF,2'S: Brand: MEDLINE

## (undated) DEVICE — CATHETER F BLLN 5CC 16FR HYDRGEL INF CTRL 2 W RESISTS

## (undated) DEVICE — CATHETER URETH 18FR RED RUB INTMIT ALL PURP

## (undated) DEVICE — NEEDLE HYPO 22GA L1.5IN BLK POLYPR HUB S STL REG BVL STR

## (undated) DEVICE — GLOVE SURG SZ 8 L11.77IN FNGR THK9.8MIL STRW LTX POLYMER

## (undated) DEVICE — Device

## (undated) DEVICE — DISH PETRI ST LF

## (undated) DEVICE — SUTURE NONABSORBABLE MONOFILAMENT 6-0 C-1 1X30 IN PROLENE 8706H

## (undated) DEVICE — SUTURE MCRYL + SZ 4-0 L18IN ABSRB UD L19MM PS-2 3/8 CIR MCP496G

## (undated) DEVICE — FOGARTY - HYDRAGRIP SURGICAL - CLAMP INSERTS: Brand: FOGARTY SOFTJAW